# Patient Record
Sex: MALE | Race: WHITE | NOT HISPANIC OR LATINO | Employment: FULL TIME | URBAN - METROPOLITAN AREA
[De-identification: names, ages, dates, MRNs, and addresses within clinical notes are randomized per-mention and may not be internally consistent; named-entity substitution may affect disease eponyms.]

---

## 2017-01-04 DIAGNOSIS — J45.20 MILD INTERMITTENT ASTHMA WITHOUT COMPLICATION: Primary | ICD-10-CM

## 2017-01-04 RX ORDER — ALBUTEROL SULFATE 90 UG/1
2 AEROSOL, METERED RESPIRATORY (INHALATION) EVERY 6 HOURS PRN
Qty: 1 INHALER | Refills: 1 | Status: SHIPPED | OUTPATIENT
Start: 2017-01-04 | End: 2017-04-14

## 2017-02-02 DIAGNOSIS — H53.10 SUBJECTIVE VISUAL DISTURBANCE: Primary | ICD-10-CM

## 2017-02-07 ENCOUNTER — OFFICE VISIT (OUTPATIENT)
Dept: OPHTHALMOLOGY | Facility: CLINIC | Age: 52
End: 2017-02-07
Attending: OPHTHALMOLOGY
Payer: COMMERCIAL

## 2017-02-07 DIAGNOSIS — H50.21 HYPERTROPIA OF RIGHT EYE: ICD-10-CM

## 2017-02-07 DIAGNOSIS — G70.00 MYASTHENIA GRAVIS (H): Primary | ICD-10-CM

## 2017-02-07 DIAGNOSIS — H53.10 SUBJECTIVE VISUAL DISTURBANCE: ICD-10-CM

## 2017-02-07 DIAGNOSIS — H53.2 DIPLOPIA: ICD-10-CM

## 2017-02-07 PROCEDURE — 99215 OFFICE O/P EST HI 40 MIN: CPT | Mod: ZF

## 2017-02-07 PROCEDURE — 92060 SENSORIMOTOR EXAMINATION: CPT | Mod: ZF | Performed by: OPHTHALMOLOGY

## 2017-02-07 ASSESSMENT — VISUAL ACUITY
OD_CC+: -2
OS_CC: 20/20
METHOD: SNELLEN - LINEAR
OD_CC: 20/25

## 2017-02-07 ASSESSMENT — CONF VISUAL FIELD
OD_NORMAL: 1
OS_NORMAL: 1

## 2017-02-07 ASSESSMENT — REFRACTION_WEARINGRX
SPECS_TYPE: SVL
OS_SPHERE: PLANO
OD_SPHERE: PLANO

## 2017-02-07 ASSESSMENT — REFRACTION_FINALRX
OS_HPRISM: 5 BU
OD_HPRISM: 5 BD

## 2017-02-07 ASSESSMENT — TONOMETRY
IOP_METHOD: TONOPEN
OD_IOP_MMHG: 14
OS_IOP_MMHG: 14

## 2017-02-07 ASSESSMENT — SLIT LAMP EXAM - LIDS
COMMENTS: PTOSIS
COMMENTS: NORMAL

## 2017-02-07 NOTE — PROGRESS NOTES
Leonidas Pratt is a 52 year old male who presents for follow-up of ocular myasthenia. He was last seen 2/2016 during which his symptoms were found to be stable and it was thought he may have burned out. He was weaned of the prednisone. A Fresnel prism was placed on the right eye. He says the his glasses have been helping him throughout the work day. He will occasionally have to patch. Occasional double vision in primary gaze. Overall, he is very stable. Did not like the black contact lens.        Assessment & Plan     Leonidas Pratt is a 51 year old male with the following diagnoses:   1. Myasthenia gravis (H)    2. Diplopia    3. Subjective visual disturbance    4. Hypertropia of right eye       Mr. 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 He has a notable right hypertropia which is improved with Fresnel prism.  This seems to have stabilized. Give 10 base down RIGHT eye Fresnel prism.    RTC in 1 year or earlier if needed.            Attending Physician Attestation:  I have seen and examined this patient.  I have confirmed and edited as necessary the chief complaint(s), history of present illness, review of systems, relevant history, and examination findings as documented by others.  I have personally reviewed the relevant tests, images, and reports as documented above.  I have confirmed and edited as necessary the assessment and plan and agree with this note.  - Arnav Melendrez MD 10:33 AM 2/7/2017

## 2017-02-07 NOTE — NURSING NOTE
Chief Complaints and History of Present Illnesses   Patient presents with     Neurologic Problem     ocular MG     HPI    Affected eye(s):  Both   Symptoms:     Double vision      Frequency:  Intermittent       Do you have eye pain now?:  No      Comments:  Aguilar is a 52 year old male presenting wit Leonidas Pratt is a 51 year old male with the following diagnoses:      1.  Myasthenia gravis (H)    2.  Diplopia      - last visit: was fit with 8 BD over right eye.   - improved binocularity with prism correction  - intermittent, binocular, oblique diplopia.  - diplopic in right gaze (manifest).  - happy with prismatic power.     Vasquez TOBIAS 8:05 AM February 7, 2017

## 2017-02-21 DIAGNOSIS — R05.9 COUGH: Primary | ICD-10-CM

## 2017-02-21 NOTE — PROGRESS NOTES
Pat has been having fevers, cough and sweats for 2 weeks.  Brief exam reveals crackles in R lower lung fields and scattered wheezes.     Will obtain Chest X-ray.   Discussed but deferred CBC

## 2017-04-14 ENCOUNTER — OFFICE VISIT (OUTPATIENT)
Dept: FAMILY MEDICINE | Facility: CLINIC | Age: 52
End: 2017-04-14

## 2017-04-14 VITALS
HEART RATE: 71 BPM | DIASTOLIC BLOOD PRESSURE: 84 MMHG | BODY MASS INDEX: 27.73 KG/M2 | WEIGHT: 182.4 LBS | RESPIRATION RATE: 16 BRPM | SYSTOLIC BLOOD PRESSURE: 124 MMHG

## 2017-04-14 DIAGNOSIS — J20.9 ACUTE BRONCHITIS, UNSPECIFIED ORGANISM: ICD-10-CM

## 2017-04-14 DIAGNOSIS — E03.9 HYPOTHYROIDISM, UNSPECIFIED TYPE: ICD-10-CM

## 2017-04-14 DIAGNOSIS — R61 NIGHT SWEATS: Primary | ICD-10-CM

## 2017-04-14 DIAGNOSIS — E78.00 HIGH BLOOD CHOLESTEROL: ICD-10-CM

## 2017-04-14 RX ORDER — ALBUTEROL SULFATE 90 UG/1
2 AEROSOL, METERED RESPIRATORY (INHALATION) EVERY 6 HOURS PRN
Qty: 1 INHALER | Refills: 0 | Status: SHIPPED | OUTPATIENT
Start: 2017-04-14 | End: 2023-02-10

## 2017-04-14 RX ORDER — LEVOTHYROXINE SODIUM 112 UG/1
112 TABLET ORAL DAILY
Qty: 90 TABLET | Refills: 3 | Status: SHIPPED | OUTPATIENT
Start: 2017-04-14 | End: 2017-08-28

## 2017-04-14 ASSESSMENT — PAIN SCALES - GENERAL: PAINLEVEL: NO PAIN (0)

## 2017-04-14 NOTE — NURSING NOTE
Chief Complaint   Patient presents with     Perspiration     pt states having night sweats     Gisele Mauricio CMA at 12:04 PM on 4/14/2017.

## 2017-04-14 NOTE — PROGRESS NOTES
SUBJECTIVE:    Pt is a 52 year old male with pmh of     Patient Active Problem List   Diagnosis     Hypothyroidism     Pre-operative cardiovascular examination     Routine general medical examination at a health care facility     Calculus of ureter (URETERAL)     Exposure to strep throat       who is here for evaluation of had concerns including Perspiration.    Here for a few things.  One, hypothyroid, needs labs for, normal thryoid ROS today.  Two, due for routin lipids, will order.  Three, 52, consider colonsocopy baseline, no sx of colon dz, he'd like to consider but not do right now.  Four, in January got sore throat, cough, fatigue, severe, lasted several weeks, did get flu shot. Those sx gradually went away, did get CXR which showed possible small pneumonia vs atelectasis. No cough, dyspnea or fevers but since then, gets night sweats. Full body. Might not be quite as bad now as at first, but still gets. No itching or wt loss. Ten pt ROS completed, o/w neg except for his ocular sx, which have been decided to be most likely MG. Not on meds for.     Allergies   Allergen Reactions     Nkda [No Known Drug Allergies]            Current Outpatient Prescriptions   Medication Sig Dispense Refill     levothyroxine (SYNTHROID/LEVOTHROID) 112 MCG tablet Take 1 tablet (112 mcg) by mouth daily 90 tablet 3     albuterol (PROAIR HFA/PROVENTIL HFA/VENTOLIN HFA) 108 (90 BASE) MCG/ACT Inhaler Inhale 2 puffs into the lungs every 6 hours as needed for shortness of breath / dyspnea or wheezing 1 Inhaler 0     [DISCONTINUED] albuterol (PROAIR HFA/PROVENTIL HFA/VENTOLIN HFA) 108 (90 BASE) MCG/ACT Inhaler Inhale 2 puffs into the lungs every 6 hours as needed for shortness of breath / dyspnea or wheezing 1 Inhaler 1     [DISCONTINUED] levothyroxine (SYNTHROID, LEVOTHROID) 112 MCG tablet TAKE ONE TABLET BY MOUTH EVERY DAY 90 tablet 3     [DISCONTINUED] albuterol (PROAIR HFA, PROVENTIL HFA, VENTOLIN HFA) 108 (90 BASE) MCG/ACT inhaler  Inhale 2 puffs into the lungs every 6 hours as needed for shortness of breath / dyspnea or wheezing 1 Inhaler 0       Social History   Substance Use Topics     Smoking status: Never Smoker     Smokeless tobacco: Never Used     Alcohol use No         OBJECTIVE:  /84 (BP Location: Right arm, Patient Position: Chair, Cuff Size: Adult Large)  Pulse 71  Resp 16  Wt 82.7 kg (182 lb 6.4 oz)  BMI 27.73 kg/m2  GENERAL APPEARANCE: Alert, no acute distress  NECK: No adenopathy,masses or thyromegaly  RESP: lungs clear to auscultation   CV: normal rate, regular rhythm, no murmur or gallop  ABDOMEN: soft, no organomegaly, masses or tenderness  MS: extremities normal, no peripheral edema  NEURO: Alert, oriented, speech and mentation normal  PSYCHE: mentation appears normal, affect and mood normal    ASSESSMENT/PLAN:    Night sweats: labs, ct. If neg consider ID w/u.  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 Has had dx vitiligo and Hashimotos so two other immune conditions.  Hypothyroid: labs   HCM: lipids. Consider colonoscopy he knows.    GREG OTERO MD

## 2017-04-14 NOTE — PATIENT INSTRUCTIONS
Primary Care Center Medication Refill Request Information:  * Please contact your pharmacy regarding ANY request for medication refills.  ** [facility] Prescription Fax = [phone]  * Please allow 3 business days for routine medication refills.  * Please allow 5 business days for controlled substance medication refills.     Primary Care Center Test Result notification information:  *You will be notified with in 7-10 days of your appointment day regarding the results of your test.  If you are on MyChart you will be notified as soon as the provider has reviewed the results and signed off on them.      Primary Care Center   [facility]  [address] [place] [address] (4th Floor )   [place] [address] [phone]  [phone]

## 2017-04-14 NOTE — MR AVS SNAPSHOT
After Visit Summary   4/14/2017    Leonidas Pratt    MRN: [id number]           Patient Information     Date Of Birth          1965        Visit Information        Provider Department      4/14/2017 12:05 PM Moreno Marion MD [facility]        Today's Diagnoses     Night sweats    -  1    Hypothyroidism, unspecified type        Acute bronchitis, unspecified organism        High blood cholesterol          Care Instructions    Primary Care Center Medication Refill Request Information:  * Please contact your pharmacy regarding ANY request for medication refills.  ** [facility] Prescription Fax = [phone]  * Please allow 3 business days for routine medication refills.  * Please allow 5 business days for controlled substance medication refills.     Primary Care Center Test Result notification information:  *You will be notified with in 7-10 days of your appointment day regarding the results of your test.  If you are on MyChart you will be notified as soon as the provider has reviewed the results and signed off on them.      Primary Care Center   [facility]  [address] [place] [address] (4th Floor )   [place] [address] [phone]  [phone]          Follow-ups after your visit        Your next 10 appointments already scheduled     Apr 21, 2017  9:00 AM CDT   (Arrive by 8:45 AM)   CT CHEST ABDOMEN PELVIS W/O & W CONTRAST with UCCT2   [facility] ([facility])    [address] [place] [address]  1st [facility] [address] [phone]           Please bring any scans or X-rays taken at other hospitals, if similar tests were done. Also bring a list of your medicines, including vitamins, minerals and over-the-counter drugs. It is safest to leave personal items at home.  Be sure to tell your doctor:   If you have any allergies.   If there s any chance you are pregnant.   If you are breastfeeding.   If you have any special needs.  You may have  contrast for this exam. To prepare:   Do not eat or drink for 2 hours before your exam. If you need to take medicine, you may take it with small sips of water. (We may ask you to take liquid medicine as well.)   The day before your exam, drink extra fluids at least six 8-ounce glasses (unless your doctor tells you to restrict your fluids).  Patients over 70 or patients with diabetes or kidney problems:   If you haven t had a blood test (creatinine test) within the last 30 days, go to your clinic or Diagnostic Imaging Department for this test.  If you have diabetes:   If your kidney function is normal, continue taking your metformin (Avandamet, Glucophage, Glucovance, Metaglip) on the day of your exam.   If your kidney function is abnormal, wait 48 hours before restarting this medicine.  You will have oral contrast for this exam:   You will drink the contrast at home. Get this from your clinic or Diagnostic Imaging Department. Please follow the directions given.  Please wear loose clothing, such as a sweat suit or jogging clothes. Avoid snaps, zippers and other metal. We may ask you to undress and put on a hospital gown.  If you have any questions, please call the Imaging Department where you will have your exam.              Future tests that were ordered for you today     Open Future Orders        Priority Expected Expires Ordered    CT Chest/Abdomen/Pelvis w/o & w contrast Routine  4/14/2018 4/14/2017    Lipid panel reflex to direct LDL Routine 4/14/2017 4/28/2017 4/14/2017    CBC with platelets differential Routine 4/14/2017 4/28/2017 4/14/2017    Comprehensive metabolic panel Routine 4/14/2017 4/28/2017 4/14/2017    TSH with free T4 reflex Routine 4/14/2017 4/28/2017 4/14/2017            Who to contact     Please call your clinic at [phone] to:    Ask questions about your health    Make or cancel appointments    Discuss your medicines    Learn about your test results    Speak to your doctor   If you have  compliments or concerns about an experience at your clinic, or if you wish to file a complaint, please contact [facility] Patient Relations at [phone] or email us at [email or web address]         Additional Information About Your Visit        Omnigyhart Information     [email or web address] is an electronic gateway that provides easy, online access to your medical records. With [email or web address], you can request a clinic appointment, read your test results, renew a prescription or communicate with your care team.     To sign up for [email or web address] visit the website at [email or web address]   You will be asked to enter the access code listed below, as well as some personal information. Please follow the directions to create your username and password.     Your access code is: [email or web address]  Expires: 2017  9:30 AM     Your access code will  in 90 days. If you need help or a new code, please contact your [facility] Clinic or call [phone] for assistance.        Care EveryWhere ID     This is your Care EveryWhere ID. This could be used by other organizations to access your [place] medical records  [id number]        Your Vitals Were     Pulse Respirations BMI (Body Mass Index)             71 16 27.73 kg/m2          Blood Pressure from Last 3 Encounters:   17 124/84   16 110/80   10/15/14 128/84    Weight from Last 3 Encounters:   17 82.7 kg (182 lb 6.4 oz)   16 81.2 kg (179 lb)   10/21/14 78.8 kg (173 lb 11.2 oz)                 Today's Medication Changes          These changes are accurate as of: 17 12:43 PM.  If you have any questions, ask your nurse or doctor.               These medicines have changed or have updated prescriptions.        Dose/Directions    albuterol 108 (90 BASE) MCG/ACT Inhaler   Commonly known as:  PROAIR HFA/PROVENTIL HFA/VENTOLIN HFA   This may have changed:  Another medication with the same name was removed.  Continue taking this medication, and follow the directions you see here.   Used for:  Acute bronchitis, unspecified organism   Changed by:  Moreno Marion MD        Dose:  2 puff   Inhale 2 puffs into the lungs every 6 hours as needed for shortness of breath / dyspnea or wheezing   Quantity:  1 Inhaler   Refills:  0       levothyroxine 112 MCG tablet   Commonly known as:  SYNTHROID/LEVOTHROID   This may have changed:  See the new instructions.   Used for:  Hypothyroidism, unspecified type   Changed by:  Moreno Marion MD        Dose:  112 mcg   Take 1 tablet (112 mcg) by mouth daily   Quantity:  90 tablet   Refills:  3         Stop taking these medicines if you haven't already. Please contact your care team if you have questions.     acetaminophen 500 MG tablet   Commonly known as:  ACETAMINOPHEN EXTRA STRENGTH   Stopped by:  Moreno Marion MD           celecoxib 100 MG capsule   Commonly known as:  celeBREX   Stopped by:  Moreno Marion MD           ibuprofen 600 MG tablet   Commonly known as:  ADVIL/MOTRIN   Stopped by:  Moreno Marion MD           omeprazole 20 MG CR capsule   Commonly known as:  priLOSEC   Stopped by:  Moreno Marion MD           ondansetron 4 MG ODT tab   Commonly known as:  ZOFRAN ODT   Stopped by:  Moreno Marion MD           oxyCODONE-acetaminophen 5-325 MG per tablet   Commonly known as:  PERCOCET   Stopped by:  Moreno Marion MD           penicillin V potassium 500 MG tablet   Commonly known as:  VEETID   Stopped by:  Moreno Marion MD           predniSONE 5 MG tablet   Commonly known as:  DELTASONE   Stopped by:  Moreno Marion MD           Vitamin D-3 5000 UNITS Tabs   Stopped by:  Moreno Marion MD                Where to get your medicines      These medications were sent to [place] [address] [address]  [address] [place] [address] [address] [address]    Hours:  TRANSPLANT  PHONE NUMBER [phone] Phone:  [phone]     albuterol 108 (90 BASE) MCG/ACT Inhaler    levothyroxine 112 MCG tablet                Primary Care Provider Office Phone # Fax #    Moreno Marion [phone]       PRIMARY CARE CENTER [address] [place] [address] [address]  [facility] [address]        Thank you!     Thank you for choosing [facility]  for your care. Our goal is always to provide you with excellent care. Hearing back from our patients is one way we can continue to improve our services. Please take a few minutes to complete the written survey that you may receive in the mail after your visit with us. Thank you!             Your Updated Medication List - Protect others around you: Learn how to safely use, store and throw away your medicines at www.disposemymeds.org.          This list is accurate as of: 4/14/17 12:43 PM.  Always use your most recent med list.                   Brand Name Dispense Instructions for use    albuterol 108 (90 BASE) MCG/ACT Inhaler    PROAIR HFA/PROVENTIL HFA/VENTOLIN HFA    1 Inhaler    Inhale 2 puffs into the lungs every 6 hours as needed for shortness of breath / dyspnea or wheezing       levothyroxine 112 MCG tablet    SYNTHROID/LEVOTHROID    90 tablet    Take 1 tablet (112 mcg) by mouth daily

## 2017-06-09 ENCOUNTER — TELEPHONE (OUTPATIENT)
Dept: OPHTHALMOLOGY | Facility: CLINIC | Age: 52
End: 2017-06-09

## 2017-06-09 DIAGNOSIS — E78.00 HIGH BLOOD CHOLESTEROL: ICD-10-CM

## 2017-06-09 DIAGNOSIS — R61 NIGHT SWEATS: ICD-10-CM

## 2017-06-09 LAB
ALBUMIN SERPL-MCNC: 3.4 G/DL (ref 3.4–5)
ALP SERPL-CCNC: 77 U/L (ref 40–150)
ALT SERPL W P-5'-P-CCNC: 21 U/L (ref 0–70)
ANION GAP SERPL CALCULATED.3IONS-SCNC: 7 MMOL/L (ref 3–14)
AST SERPL W P-5'-P-CCNC: 17 U/L (ref 0–45)
BASOPHILS # BLD AUTO: 0 10E9/L (ref 0–0.2)
BASOPHILS NFR BLD AUTO: 0.7 %
BILIRUB SERPL-MCNC: 0.4 MG/DL (ref 0.2–1.3)
BUN SERPL-MCNC: 17 MG/DL (ref 7–30)
CALCIUM SERPL-MCNC: 7.8 MG/DL (ref 8.5–10.1)
CHLORIDE SERPL-SCNC: 106 MMOL/L (ref 94–109)
CHOLEST SERPL-MCNC: 198 MG/DL
CO2 SERPL-SCNC: 26 MMOL/L (ref 20–32)
CREAT SERPL-MCNC: 1.07 MG/DL (ref 0.66–1.25)
DIFFERENTIAL METHOD BLD: ABNORMAL
EOSINOPHIL # BLD AUTO: 0.1 10E9/L (ref 0–0.7)
EOSINOPHIL NFR BLD AUTO: 2.2 %
ERYTHROCYTE [DISTWIDTH] IN BLOOD BY AUTOMATED COUNT: 12.9 % (ref 10–15)
GFR SERPL CREATININE-BSD FRML MDRD: 73 ML/MIN/1.7M2
GLUCOSE SERPL-MCNC: 84 MG/DL (ref 70–99)
HCT VFR BLD AUTO: 37.8 % (ref 40–53)
HDLC SERPL-MCNC: 37 MG/DL
HGB BLD-MCNC: 13 G/DL (ref 13.3–17.7)
IMM GRANULOCYTES # BLD: 0 10E9/L (ref 0–0.4)
IMM GRANULOCYTES NFR BLD: 0.6 %
LDLC SERPL CALC-MCNC: 142 MG/DL
LYMPHOCYTES # BLD AUTO: 1.9 10E9/L (ref 0.8–5.3)
LYMPHOCYTES NFR BLD AUTO: 35.7 %
MCH RBC QN AUTO: 31.1 PG (ref 26.5–33)
MCHC RBC AUTO-ENTMCNC: 34.4 G/DL (ref 31.5–36.5)
MCV RBC AUTO: 90 FL (ref 78–100)
MONOCYTES # BLD AUTO: 0.6 10E9/L (ref 0–1.3)
MONOCYTES NFR BLD AUTO: 10.4 %
NEUTROPHILS # BLD AUTO: 2.7 10E9/L (ref 1.6–8.3)
NEUTROPHILS NFR BLD AUTO: 50.4 %
NONHDLC SERPL-MCNC: 161 MG/DL
NRBC # BLD AUTO: 0 10*3/UL
NRBC BLD AUTO-RTO: 0 /100
PLATELET # BLD AUTO: 218 10E9/L (ref 150–450)
POTASSIUM SERPL-SCNC: 3.9 MMOL/L (ref 3.4–5.3)
PROT SERPL-MCNC: 6.3 G/DL (ref 6.8–8.8)
RBC # BLD AUTO: 4.18 10E12/L (ref 4.4–5.9)
SODIUM SERPL-SCNC: 139 MMOL/L (ref 133–144)
T4 FREE SERPL-MCNC: 1.19 NG/DL (ref 0.76–1.46)
TRIGL SERPL-MCNC: 93 MG/DL
TSH SERPL DL<=0.005 MIU/L-ACNC: 7.9 MU/L (ref 0.4–4)
WBC # BLD AUTO: 5.4 10E9/L (ref 4–11)

## 2017-06-09 NOTE — TELEPHONE ENCOUNTER
Pt would like to discuss more about having one lens with prism vs both lenses with prism  reviewed with optical shop who will contact pt and let pt know will have orthoptist call Monday    Note to orthoptist to f/u Monday    Julian Peetrs RN 4:10 PM 06/09/17

## 2017-06-09 NOTE — TELEPHONE ENCOUNTER
Pt wishing to have prism ground in glasses  Glasses Rx availble  Able to print and have pt , fax, mail or pt may use [employer] to print-- unable to email.  Left message with pt with options and direct number  Julian Peters RN 10:01 AM 06/09/17

## 2017-06-09 NOTE — TELEPHONE ENCOUNTER
----- Message from Lianne Crystal sent at 6/9/2017  9:02 AM CDT -----  Regarding: PT LOOKING FOR PRISM STRENGTH OF RIGHT LENS TO GET GROUND  Contact: [phone]  Pt is Pt of Dr. Melendrez and Dr. Melendrez informed him if he every wanted to get a lens ground for this right eye (currently using paste on version) to call clinic and ask for prism strength. Pt is getting lenses made later today and requested the prism strength for right lens to be emailed to him at [email or web address].    Any questions, feel free to give Pt a call back at [phone].    Thank you,    [facility]    Please DO NOT send message and or reply back to sender. Call center Representatives DO NOT respond to Messages.

## 2017-06-09 NOTE — TELEPHONE ENCOUNTER
Last glasses Rx faxed to pt's optical shop per request  Reviewed prism will be in both lenses per Rx and reviewed when ground in is typical to mervin Peters RN 3:23 PM 06/09/17

## 2017-06-22 ENCOUNTER — TELEPHONE (OUTPATIENT)
Dept: INTERNAL MEDICINE | Facility: CLINIC | Age: 52
End: 2017-06-22

## 2017-06-22 DIAGNOSIS — E83.51 LOW CALCIUM LEVELS: ICD-10-CM

## 2017-06-22 DIAGNOSIS — D64.9 ANEMIA: Primary | ICD-10-CM

## 2017-06-22 DIAGNOSIS — R79.89 HIGH SERUM PARATHYROID HORMONE (PTH): ICD-10-CM

## 2017-06-22 DIAGNOSIS — E88.09 PROTEINS SERUM PLASMA LOW: ICD-10-CM

## 2017-06-22 NOTE — TELEPHONE ENCOUNTER
Lab orders were placed.    Soon-Mi  -----------------------------------------------      ----- Message from Moreno Marion MD sent at 6/22/2017 12:36 PM CDT -----  TAMMIE I did leave him a message, haven't heard back, maybe on vacation.    I told him we'll do some followup labs, we could get those ordered, can you put these in?    For anemia:  SPEP  Elias  Peripheral smear  CBC/diff  Reticulocyte count    For low calcium:  Free (also called ionized) calcium  PTH    For low protein:  Hepatic panel  UA    I'll call back later today    Marcus

## 2017-06-27 DIAGNOSIS — D64.9 ANEMIA: ICD-10-CM

## 2017-06-27 DIAGNOSIS — E88.09 PROTEINS SERUM PLASMA LOW: ICD-10-CM

## 2017-06-27 DIAGNOSIS — E83.51 LOW CALCIUM LEVELS: ICD-10-CM

## 2017-06-27 LAB
ALBUMIN SERPL-MCNC: 4.4 G/DL (ref 3.4–5)
ALBUMIN UR-MCNC: NEGATIVE MG/DL
ALP SERPL-CCNC: 100 U/L (ref 40–150)
ALT SERPL W P-5'-P-CCNC: 36 U/L (ref 0–70)
APPEARANCE UR: CLEAR
AST SERPL W P-5'-P-CCNC: 24 U/L (ref 0–45)
BASOPHILS # BLD AUTO: 0 10E9/L (ref 0–0.2)
BASOPHILS NFR BLD AUTO: 0.6 %
BILIRUB DIRECT SERPL-MCNC: 0.2 MG/DL (ref 0–0.2)
BILIRUB SERPL-MCNC: 0.6 MG/DL (ref 0.2–1.3)
BILIRUB UR QL STRIP: NEGATIVE
CA-I SERPL ISE-MCNC: 4.8 MG/DL (ref 4.4–5.2)
COLOR UR AUTO: YELLOW
DAT IGG-SP REAG RBC-IMP: NORMAL
DAT POLY-SP REAG RBC QL: NORMAL
DIFFERENTIAL METHOD BLD: NORMAL
EOSINOPHIL # BLD AUTO: 0.1 10E9/L (ref 0–0.7)
EOSINOPHIL NFR BLD AUTO: 1.2 %
ERYTHROCYTE [DISTWIDTH] IN BLOOD BY AUTOMATED COUNT: 13 % (ref 10–15)
GLUCOSE UR STRIP-MCNC: NEGATIVE MG/DL
HCT VFR BLD AUTO: 44.7 % (ref 40–53)
HGB BLD-MCNC: 15.2 G/DL (ref 13.3–17.7)
HGB UR QL STRIP: NEGATIVE
IMM GRANULOCYTES # BLD: 0 10E9/L (ref 0–0.4)
IMM GRANULOCYTES NFR BLD: 0.4 %
KETONES UR STRIP-MCNC: NEGATIVE MG/DL
LEUKOCYTE ESTERASE UR QL STRIP: NEGATIVE
LYMPHOCYTES # BLD AUTO: 2.4 10E9/L (ref 0.8–5.3)
LYMPHOCYTES NFR BLD AUTO: 33.6 %
MCH RBC QN AUTO: 31.1 PG (ref 26.5–33)
MCHC RBC AUTO-ENTMCNC: 34 G/DL (ref 31.5–36.5)
MCV RBC AUTO: 91 FL (ref 78–100)
MONOCYTES # BLD AUTO: 0.7 10E9/L (ref 0–1.3)
MONOCYTES NFR BLD AUTO: 9 %
MUCOUS THREADS #/AREA URNS LPF: PRESENT /LPF
NEUTROPHILS # BLD AUTO: 4 10E9/L (ref 1.6–8.3)
NEUTROPHILS NFR BLD AUTO: 55.2 %
NITRATE UR QL: NEGATIVE
NRBC # BLD AUTO: 0 10*3/UL
NRBC BLD AUTO-RTO: 0 /100
PH UR STRIP: 6 PH (ref 5–7)
PLATELET # BLD AUTO: 271 10E9/L (ref 150–450)
PROT SERPL-MCNC: 8.1 G/DL (ref 6.8–8.8)
PTH-INTACT SERPL-MCNC: 91 PG/ML (ref 12–72)
RBC # BLD AUTO: 4.89 10E12/L (ref 4.4–5.9)
RBC #/AREA URNS AUTO: 1 /HPF (ref 0–2)
RETICS # AUTO: 77.8 10E9/L (ref 25–95)
RETICS/RBC NFR AUTO: 1.6 % (ref 0.5–2)
SP GR UR STRIP: 1.03 (ref 1–1.03)
URN SPEC COLLECT METH UR: ABNORMAL
UROBILINOGEN UR STRIP-MCNC: 0 MG/DL (ref 0–2)
WBC # BLD AUTO: 7.2 10E9/L (ref 4–11)
WBC #/AREA URNS AUTO: <1 /HPF (ref 0–2)

## 2017-06-28 LAB
ALBUMIN SERPL ELPH-MCNC: 4.9 G/DL (ref 3.7–5.1)
ALPHA1 GLOB SERPL ELPH-MCNC: 0.3 G/DL (ref 0.2–0.4)
ALPHA2 GLOB SERPL ELPH-MCNC: 0.7 G/DL (ref 0.5–0.9)
B-GLOBULIN SERPL ELPH-MCNC: 0.8 G/DL (ref 0.6–1)
COPATH REPORT: NORMAL
GAMMA GLOB SERPL ELPH-MCNC: 1.2 G/DL (ref 0.7–1.6)
M PROTEIN SERPL ELPH-MCNC: 0 G/DL
PROT PATTERN SERPL ELPH-IMP: NORMAL

## 2017-06-29 LAB — DEPRECATED CALCIDIOL+CALCIFEROL SERPL-MC: 31 UG/L (ref 20–75)

## 2017-07-12 DIAGNOSIS — E03.9 HYPOTHYROIDISM, UNSPECIFIED TYPE: Primary | ICD-10-CM

## 2017-07-12 DIAGNOSIS — E03.9 HYPOTHYROIDISM, UNSPECIFIED TYPE: ICD-10-CM

## 2017-07-12 DIAGNOSIS — G70.00 MYASTHENIA GRAVIS (H): Primary | ICD-10-CM

## 2017-07-12 LAB — TSH SERPL DL<=0.05 MIU/L-ACNC: 0.21 MU/L (ref 0.4–4)

## 2017-07-12 RX ORDER — PREDNISONE 10 MG/1
50 TABLET ORAL DAILY
Qty: 150 TABLET | Refills: 3 | Status: SHIPPED | OUTPATIENT
Start: 2017-07-12 | End: 2019-02-27

## 2017-08-03 ENCOUNTER — HOSPITAL ENCOUNTER (OUTPATIENT)
Facility: CLINIC | Age: 52
Setting detail: SPECIMEN
Discharge: HOME | End: 2017-08-03
Admitting: FAMILY MEDICINE
Payer: COMMERCIAL

## 2017-08-03 ENCOUNTER — TELEPHONE (OUTPATIENT)
Dept: ORTHOPEDICS | Facility: CLINIC | Age: 52
End: 2017-08-03

## 2017-08-03 DIAGNOSIS — J02.9 ACUTE PHARYNGITIS, UNSPECIFIED ETIOLOGY: Primary | ICD-10-CM

## 2017-08-03 PROCEDURE — 87880 STREP A ASSAY W/OPTIC: CPT

## 2017-08-03 RX ORDER — PENICILLIN V POTASSIUM 500 MG/1
500 TABLET, FILM COATED ORAL 2 TIMES DAILY
Qty: 20 TABLET | Refills: 0 | Status: SHIPPED | OUTPATIENT
Start: 2017-08-03 | End: 2018-09-19

## 2017-08-28 DIAGNOSIS — E03.9 HYPOTHYROIDISM, UNSPECIFIED TYPE: ICD-10-CM

## 2017-08-28 RX ORDER — LEVOTHYROXINE SODIUM 150 UG/1
150 TABLET ORAL DAILY
Qty: 30 TABLET | Refills: 1 | Status: SHIPPED | OUTPATIENT
Start: 2017-08-28 | End: 2019-09-30 | Stop reason: DRUGHIGH

## 2017-09-06 DIAGNOSIS — E03.9 HYPOTHYROIDISM, UNSPECIFIED TYPE: Primary | ICD-10-CM

## 2017-09-06 RX ORDER — LEVOTHYROXINE SODIUM 125 UG/1
125 TABLET ORAL DAILY
Qty: 90 TABLET | Refills: 3 | Status: SHIPPED | OUTPATIENT
Start: 2017-09-06 | End: 2018-09-04

## 2017-09-12 DIAGNOSIS — E03.9 HYPOTHYROIDISM, UNSPECIFIED TYPE: ICD-10-CM

## 2017-09-20 ENCOUNTER — HOSPITAL ENCOUNTER (OUTPATIENT)
Facility: CLINIC | Age: 52
Setting detail: SPECIMEN
End: 2017-09-20

## 2017-09-28 DIAGNOSIS — J02.9 ACUTE PHARYNGITIS, UNSPECIFIED ETIOLOGY: ICD-10-CM

## 2017-09-28 DIAGNOSIS — J02.9 ACUTE PHARYNGITIS, UNSPECIFIED ETIOLOGY: Primary | ICD-10-CM

## 2017-09-28 DIAGNOSIS — R07.0 THROAT PAIN IN ADULT: Primary | ICD-10-CM

## 2017-09-28 LAB
DEPRECATED S PYO AG THROAT QL EIA: NORMAL
SPECIMEN SOURCE: NORMAL

## 2017-09-30 LAB
BACTERIA SPEC CULT: NORMAL
Lab: NORMAL
SPECIMEN SOURCE: NORMAL

## 2017-10-26 ENCOUNTER — TELEPHONE (OUTPATIENT)
Dept: INTERNAL MEDICINE | Facility: CLINIC | Age: 52
End: 2017-10-26

## 2017-10-26 DIAGNOSIS — E03.9 HYPOTHYROIDISM: Primary | ICD-10-CM

## 2017-10-26 DIAGNOSIS — E03.9 HYPOTHYROIDISM: ICD-10-CM

## 2017-10-26 LAB — TSH SERPL DL<=0.005 MIU/L-ACNC: 2.47 MU/L (ref 0.4–4)

## 2018-01-11 DIAGNOSIS — J11.1 INFLUENZA: Primary | ICD-10-CM

## 2018-01-11 RX ORDER — OSELTAMIVIR PHOSPHATE 75 MG/1
75 CAPSULE ORAL 2 TIMES DAILY
Qty: 10 CAPSULE | Refills: 0 | Status: SHIPPED | OUTPATIENT
Start: 2018-01-11 | End: 2018-09-19

## 2018-06-11 ENCOUNTER — TELEPHONE (OUTPATIENT)
Dept: INTERNAL MEDICINE | Facility: CLINIC | Age: 53
End: 2018-06-11

## 2018-06-11 DIAGNOSIS — E03.9 HYPOTHYROIDISM: ICD-10-CM

## 2018-06-11 DIAGNOSIS — E03.9 HYPOTHYROIDISM: Primary | ICD-10-CM

## 2018-06-11 LAB — TSH SERPL DL<=0.005 MIU/L-ACNC: 1.7 MU/L (ref 0.4–4)

## 2018-09-04 DIAGNOSIS — E03.9 HYPOTHYROIDISM, UNSPECIFIED TYPE: ICD-10-CM

## 2018-09-04 RX ORDER — LEVOTHYROXINE SODIUM 125 UG/1
TABLET ORAL
Qty: 90 TABLET | Refills: 3 | Status: SHIPPED | OUTPATIENT
Start: 2018-09-04 | End: 2019-09-30

## 2018-09-19 ENCOUNTER — TELEPHONE (OUTPATIENT)
Dept: INTERNAL MEDICINE | Facility: CLINIC | Age: 53
End: 2018-09-19

## 2018-09-19 DIAGNOSIS — Z12.11 COLON CANCER SCREENING: Primary | ICD-10-CM

## 2018-09-20 ENCOUNTER — TELEPHONE (OUTPATIENT)
Dept: GASTROENTEROLOGY | Facility: CLINIC | Age: 53
End: 2018-09-20

## 2018-09-21 ENCOUNTER — TELEPHONE (OUTPATIENT)
Dept: GASTROENTEROLOGY | Facility: CLINIC | Age: 53
End: 2018-09-21

## 2018-09-24 ENCOUNTER — TELEPHONE (OUTPATIENT)
Dept: GASTROENTEROLOGY | Facility: CLINIC | Age: 53
End: 2018-09-24

## 2018-10-22 ENCOUNTER — ALLIED HEALTH/NURSE VISIT (OUTPATIENT)
Dept: INTERNAL MEDICINE | Facility: CLINIC | Age: 53
End: 2018-10-22
Payer: COMMERCIAL

## 2018-10-22 DIAGNOSIS — Z11.1 SCREENING EXAMINATION FOR PULMONARY TUBERCULOSIS: Primary | ICD-10-CM

## 2018-10-22 NOTE — LETTER
Tuberculin Skin Test  Reporting Form    2018    Patient s Name  Leonidas Pratt  :  1965      Date Given: ________10/22/2018______ Time:____09___ AM Lot#_[id number]____  Location: Right Forearm    YVONNE Frias  _________________________________________  Staff Name         PPD Skin test site(s) must be read 48-72 hours after placed!    Date Read: _10_/_24_/_18_ Time: _2:45_ PM  Result: _0_ mm (if no induration, write 0)  Comments:        _Sweta Maier LPN__________________________________________________  Staff Signature

## 2018-10-22 NOTE — MR AVS SNAPSHOT
After Visit Summary   10/22/2018    Leonidas Pratt    MRN: [id number]           Patient Information     Date Of Birth          1965        Visit Information        Provider Department      10/22/2018 9:30 AM Nurse, [facility]        Today's Diagnoses     Screening examination for pulmonary tuberculosis    -  1       Follow-ups after your visit        Your next 10 appointments already scheduled     Oct 22, 2018  9:30 AM CDT   Nurse Visit with [facility] Nurse   [facility] ([facility])    [address] [facility]  4th [facility] [address] [phone]              Who to contact     Please call your clinic at [phone] to:    Ask questions about your health    Make or cancel appointments    Discuss your medicines    Learn about your test results    Speak to your doctor            Additional Information About Your Visit        MyChart Information     [employer] gives you secure access to your electronic health record. If you see a primary care provider, you can also send messages to your care team and make appointments. If you have questions, please call your primary care clinic.  If you do not have a primary care provider, please call [phone] and they will assist you.      [employer] is an electronic gateway that provides easy, online access to your medical records. With [employer], you can request a clinic appointment, read your test results, renew a prescription or communicate with your care team.     To access your existing account, please contact your [facility] or call [phone] for assistance.        Care EveryWhere ID     This is your Care EveryWhere ID. This could be used by other organizations to access your [place] medical records  [id number]         Blood Pressure from Last 3 Encounters:   04/14/17 124/84   01/03/16 110/80   10/15/14 128/84    Weight from Last 3 Encounters:    04/14/17 82.7 kg (182 lb 6.4 oz)   01/03/16 81.2 kg (179 lb)   10/21/14 78.8 kg (173 lb 11.2 oz)              We Performed the Following     TB INTRADERMAL TEST        Primary Care Provider Office Phone # Fax #    Moreno Marion [phone]       [address] [address]        Equal Access to Services     BRADY PUENTE : Hadii aad ku hadasho Soomaali, waaxda luqadaha, qaybta kaalmada adeegyada, waxay idiin hayaan adeeg kharash labrenda . So [facility] [phone].    ATENCIÓN: Si jesus black, tiene a tompkins disposición servicios gratuitos de asistencia lingüística. Ll[facility] [phone].    We comply with applicable federal civil rights laws and [place] laws. We do not discriminate on the basis of race, color, national origin, age, disability, sex, sexual orientation, or gender identity.            Thank you!     Thank you for choosing [facility]  for your care. Our goal is always to provide you with excellent care. Hearing back from our patients is one way we can continue to improve our services. Please take a few minutes to complete the written survey that you may receive in the mail after your visit with us. Thank you!             Your Updated Medication List - Protect others around you: Learn how to safely use, store and throw away your medicines at www.disposemymeds.org.          This list is accurate as of 10/22/18  9:21 AM.  Always use your most recent med list.                   Brand Name Dispense Instructions for use Diagnosis    albuterol 108 (90 Base) MCG/ACT inhaler    PROAIR HFA/PROVENTIL HFA/VENTOLIN HFA    1 Inhaler    Inhale 2 puffs into the lungs every 6 hours as needed for shortness of breath / dyspnea or wheezing    Acute bronchitis, unspecified organism       * levothyroxine 150 MCG tablet    SYNTHROID/LEVOTHROID    30 tablet    Take 1 tablet (150 mcg) by mouth daily    Hypothyroidism, unspecified type       * levothyroxine 125 MCG tablet     SYNTHROID/LEVOTHROID    90 tablet    TAKE ONE TABLET BY MOUTH EVERY DAY    Hypothyroidism, unspecified type       predniSONE 10 MG tablet    DELTASONE    150 tablet    Take 5 tablets (50 mg) by mouth daily    Myasthenia gravis (H)       * Notice:  This list has 2 medication(s) that are the same as other medications prescribed for you. Read the directions carefully, and ask your doctor or other care provider to review them with you.

## 2018-10-22 NOTE — NURSING NOTE
Chief Complaint   Patient presents with     Allied Health Visit     merrick Barth at 9:18 AM on 10/22/2018.

## 2019-02-06 ENCOUNTER — DOCUMENTATION ONLY (OUTPATIENT)
Dept: CARE COORDINATION | Facility: CLINIC | Age: 54
End: 2019-02-06

## 2019-02-27 ENCOUNTER — OFFICE VISIT (OUTPATIENT)
Dept: OPHTHALMOLOGY | Facility: CLINIC | Age: 54
End: 2019-02-27
Payer: COMMERCIAL

## 2019-02-27 DIAGNOSIS — G70.00 OCULAR MYASTHENIA (H): Primary | ICD-10-CM

## 2019-02-27 DIAGNOSIS — H50.05 ALTERNATING ESOTROPIA: ICD-10-CM

## 2019-02-27 RX ORDER — PREDNISONE 5 MG/1
5 TABLET ORAL DAILY
Qty: 30 TABLET | Refills: 11 | Status: SHIPPED | OUTPATIENT
Start: 2019-02-27 | End: 2020-03-23

## 2019-02-27 RX ORDER — APRACLONIDINE HYDROCHLORIDE 5 MG/ML
1-2 SOLUTION/ DROPS OPHTHALMIC 3 TIMES DAILY
Qty: 1 ML | Refills: 11 | Status: SHIPPED | OUTPATIENT
Start: 2019-02-27 | End: 2020-04-16

## 2019-02-27 RX ORDER — PREDNISONE 5 MG/1
5 TABLET ORAL DAILY
COMMUNITY
End: 2019-09-30

## 2019-02-27 ASSESSMENT — REFRACTION_MANIFEST
OD_AXIS: 085
OS_AXIS: 035
OS_SPHERE: -0.75
OD_SPHERE: -0.50
OD_CYLINDER: +0.50
OS_CYLINDER: +1.00

## 2019-02-27 ASSESSMENT — CUP TO DISC RATIO
OS_RATIO: 0.3
OD_RATIO: 0.2

## 2019-02-27 ASSESSMENT — TONOMETRY
IOP_METHOD: ICARE
OD_IOP_MMHG: 10
OS_IOP_MMHG: 12

## 2019-02-27 ASSESSMENT — SLIT LAMP EXAM - LIDS
COMMENTS: NORMAL
COMMENTS: NORMAL

## 2019-02-27 ASSESSMENT — VISUAL ACUITY
OD_CC: 20/20
CORRECTION_TYPE: GLASSES
METHOD: SNELLEN - LINEAR
OS_CC: 20/20

## 2019-02-27 NOTE — PROGRESS NOTES
Assessment & Plan     Leonidas Pratt is a 54 year old male with the following diagnoses:   1. Ocular myasthenia (H)    2. Alternating esotropia         Patient is here for recheck of ocular myasthenia.  Last visit February 2017.  At that time he ws stable with and we gave him a 10 base down Fresnel prism over the right eye. Today he is no longer experiencing double vision without prism after restarting prednisone.  He is currently taking 5 mg of prednisone.       Visual acuity today stable.  He is orthophoric in primary gaze without prism.  .      It is my impression that patient has ocular myasthenia.  He is status-post strabismus surgery before the diagnosis of ocular myasthenia gravis was made.  His strabismus off prednisone was stable for > 1 year.  In December, he experienced worsening and self medicated with prednisone 60 mg daily tapering 10 mg/d each week.  He currently is taking 5 mg of mestinon and his strabismus is MUCH better without prism.   He would prefer to be off prednisone.  We discussed other options.  I will start him on iopidine 3 times daily as needed to see if improves ptosis.  Continue prednisone 5 mg daily.  Follow up 1 year sooner as needed for worsening symptoms.          Attending Physician Attestation:  Complete documentation of historical and exam elements from today's encounter can be found in the full encounter summary report (not reduplicated in this progress note).  I personally obtained the chief complaint(s) and history of present illness.  I confirmed and edited as necessary the review of systems, past medical/surgical history, family history, social history, and examination findings as documented by others; and I examined the patient myself.  I personally reviewed the relevant tests, images, and reports as documented above.  I formulated and edited as necessary the assessment and plan and discussed the findings and management plan with the patient and family. - Arnav  JESS Melendrez MD

## 2019-09-30 ENCOUNTER — TELEPHONE (OUTPATIENT)
Dept: FAMILY MEDICINE | Facility: CLINIC | Age: 54
End: 2019-09-30

## 2019-09-30 ENCOUNTER — HEALTH MAINTENANCE LETTER (OUTPATIENT)
Age: 54
End: 2019-09-30

## 2019-09-30 DIAGNOSIS — E03.9 HYPOTHYROIDISM, UNSPECIFIED TYPE: ICD-10-CM

## 2019-09-30 DIAGNOSIS — Z00.00 HEALTH CARE MAINTENANCE: ICD-10-CM

## 2019-09-30 DIAGNOSIS — E03.9 HYPOTHYROIDISM, UNSPECIFIED TYPE: Primary | ICD-10-CM

## 2019-09-30 LAB
CHOLEST SERPL-MCNC: 247 MG/DL
HBA1C MFR BLD: 5.4 % (ref 0–5.6)
HDLC SERPL-MCNC: 44 MG/DL
LDLC SERPL CALC-MCNC: 171 MG/DL
NONHDLC SERPL-MCNC: 202 MG/DL
TRIGL SERPL-MCNC: 159 MG/DL
TSH SERPL DL<=0.005 MIU/L-ACNC: 1.05 MU/L (ref 0.4–4)

## 2019-09-30 RX ORDER — LEVOTHYROXINE SODIUM 125 UG/1
125 TABLET ORAL DAILY
Qty: 90 TABLET | Refills: 3 | Status: SHIPPED | OUTPATIENT
Start: 2019-09-30 | End: 2020-10-05

## 2019-09-30 NOTE — TELEPHONE ENCOUNTER
Lab called , pt is at lab now but labs are pending future. Spoke with staff who will call lab back to discuss. Tena Douglas Paramedic on 9/30/2019 at 11:25 AM

## 2019-09-30 NOTE — PROGRESS NOTES
Patient in need of Rx refill of Levothyroxine, Rx sent to pharmacy per provider. Routine labs pended and sent to provider for review. Sweta Maier LPN 9/30/2019 11:07 AM

## 2019-12-03 ENCOUNTER — ALLIED HEALTH/NURSE VISIT (OUTPATIENT)
Dept: INTERNAL MEDICINE | Facility: CLINIC | Age: 54
End: 2019-12-03
Payer: COMMERCIAL

## 2019-12-03 DIAGNOSIS — Z23 NEED FOR INFLUENZA VACCINATION: Primary | ICD-10-CM

## 2019-12-03 NOTE — NURSING NOTE
Leonidas Pratt comes into clinic today at the request of Dr Moreno Marion, Ordering Provider for an immunization/s.    I gave the flu immunization/s while the patient was in clinic. They received an informational sheet and I explained the common side effects of the injection. The patient tolerated the procedure well and had no complications while here in clinic.     This service provided today was under the supervising provider of the day Dr. Moreno Marion  , who was available if needed.    Kathy Barth, EMT at 11:39 AM on 12/3/2019.

## 2020-03-14 ENCOUNTER — VIRTUAL VISIT (OUTPATIENT)
Dept: FAMILY MEDICINE | Facility: OTHER | Age: 55
End: 2020-03-14

## 2020-03-14 ENCOUNTER — COMMUNICATION - HEALTHEAST (OUTPATIENT)
Dept: SCHEDULING | Facility: CLINIC | Age: 55
End: 2020-03-14

## 2020-03-14 NOTE — PROGRESS NOTES
"Date: 2020 10:28:03  Clinician: Kami Parr  Clinician NPI: [id number]  Patient: nieves rosales  Patient : 1965  Patient Address: [address] [place] [address], [place] [address]  Patient Phone: [phone]  Visit Protocol: URI  Patient Summary:  nieves is a 55 year old ( : 1965 ) male who initiated a Visit for COVID-19 (Coronavirus) evaluation and screening. When asked the question \"Please sign me up to receive news, health information and promotions from [employer].\", nieves responded \"No\".    nieves states his symptoms started 1-2 days ago.   His symptoms consist of malaise, myalgia, chills, and a cough. He is experiencing mild difficulty breathing with activities but can speak normally in full sentences. nieves also feels feverish but was unable to measure his temperature.   Symptom details   Cough: nieves coughs a few times an hour and his cough is more bothersome at night. Phlegm does not come into his throat when he coughs. He does not believe his cough is caused by post-nasal drip.    nieves denies having teeth pain, ear pain, headache, rhinitis, enlarged lymph nodes, facial pain or pressure, wheezing, sore throat, and nasal congestion. He also denies having recent facial or sinus surgery in the past 60 days and taking antibiotic medication for the symptoms.   Precipitating events  He has not recently been exposed to someone with influenza. nieves has been in close contact with the following high risk individuals: adults 65 or older, pregnant women, people with asthma, heart disease or diabetes, and immunocompromised people.   Pertinent COVID-19 (Coronavirus) information  nieves has not traveled internationally or to the areas where COVID-19 (Coronavirus) is widespread in the last 14 days before the start of his symptoms.   nieves has not had close contact with a suspected or laboratory-confirmed COVID-19 patient within 14 days of symptom onset.   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   Pertinent medical history  nieves does not need a return to work/school note.   Weight: 165 lbs   nieves does not smoke or use smokeless tobacco.   Additional information as reported by the patient (free text): i'm on chronic prednisone for immunosuppression   Weight: 165 lbs    MEDICATIONS: albuterol sulfate inhalation, Synthroid oral, prednisone oral, ALLERGIES: NKDA  Clinician Response:  Dear nieves,   Dear nieves,  Based on the information you have provided, it does not appear you need Coronavirus (COVID-19) testing. Unfortunately, based on your information we are also not able to provide a diagnosis. We feel an in-person visit with a provider is more appropriate for your condition based on your interview. We'd be honored to see you in one of our clinics or urgent cares. Please call [phone] to schedule an appointment.  We request that you bring documentation of your completed [facility] visit to your clinic appointment. Failure to do so may result in a request to repeat your [facility] visit. Documentation could include a printout from your visit or show the  the completed visit on your phone.  Why no testing?  At this time, we recommend testing primarily for those people who have typical symptoms of cough and fever and have either traveled to a known high risk area of infection or who have been exposed to someone with Coronavirus by close contact.   For more information about COVID19 and options for caring for yourself at home, please visit the CDC website at https://www.cdc.gov/coronavirus/2019-ncov/about/steps-when-sick.html   For more options for care at [facility], please visit our website at [email or web address]     Diagnosis: Cough  Diagnosis ICD: R05

## 2020-03-21 DIAGNOSIS — G70.00 OCULAR MYASTHENIA (H): ICD-10-CM

## 2020-03-23 RX ORDER — PREDNISONE 5 MG/1
5 TABLET ORAL DAILY
Qty: 30 TABLET | Refills: 11 | Status: SHIPPED | OUTPATIENT
Start: 2020-03-23 | End: 2020-04-30

## 2020-03-23 NOTE — TELEPHONE ENCOUNTER
"  predniSONE (DELTASONE) 5 MG tablet    Last Written Prescription Date:  2/27/19  Last Fill Quantity: 30,   # refills: 11  Last Office Visit : 2/27/19  Future Office visit: none    \"Continue prednisone 5 mg daily\"    \" Follow up 1 year sooner :.    Routing refill request to provider for review/approval because: not on protocol. Needs provider review.  "

## 2020-04-16 DIAGNOSIS — G70.00 OCULAR MYASTHENIA (H): ICD-10-CM

## 2020-04-16 RX ORDER — APRACLONIDINE HYDROCHLORIDE 5 MG/ML
1-2 SOLUTION/ DROPS OPHTHALMIC 3 TIMES DAILY
Qty: 5 ML | Refills: 3 | Status: SHIPPED | OUTPATIENT
Start: 2020-04-16 | End: 2021-02-17

## 2020-04-16 NOTE — TELEPHONE ENCOUNTER
Last Clinic Visit: 2/27/2019  [facility] ( RTC 1 year)  Last Clinic note   I will start him on iopidine 3 times daily as needed to see if improves ptosis

## 2020-04-17 ENCOUNTER — TELEPHONE (OUTPATIENT)
Dept: OPHTHALMOLOGY | Facility: CLINIC | Age: 55
End: 2020-04-17

## 2020-04-17 NOTE — TELEPHONE ENCOUNTER
Patient left message that he would like to schedule a video or telephone visit regarding his Ocular myasthenia and alternating esotropia.

## 2020-04-20 NOTE — TELEPHONE ENCOUNTER
Spoke to patient.  He has been having a lot of difficulty with diplopia and unable to keep his lids open the last few days.  He increased his prednisone up to 40 mg daily and has noticed some improvement in his lids today.  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  Tentatively scheduled him for a virtual appointment 4/30 at 1:00 p.m. Will discuss with Dr. Melendrez to make sure he is comfortable doing virtual visit, and get back to patient if other arrangements need to be made.      Kelley Leone on 4/20/2020 at 12:41 PM

## 2020-04-23 ENCOUNTER — TELEPHONE (OUTPATIENT)
Dept: OPHTHALMOLOGY | Facility: CLINIC | Age: 55
End: 2020-04-23

## 2020-04-23 DIAGNOSIS — G70.00 OCULAR MYASTHENIA (H): Primary | ICD-10-CM

## 2020-04-23 RX ORDER — PREDNISONE 10 MG/1
30 TABLET ORAL DAILY
Qty: 90 TABLET | Refills: 1 | Status: SHIPPED | OUTPATIENT
Start: 2020-04-23 | End: 2021-03-17

## 2020-04-23 NOTE — TELEPHONE ENCOUNTER
Prescription sent to patient's pharmacy for 10 mg tabs.      Kelley Leone on 4/23/2020 at 2:53 PM

## 2020-04-30 ENCOUNTER — VIRTUAL VISIT (OUTPATIENT)
Dept: OPHTHALMOLOGY | Facility: CLINIC | Age: 55
End: 2020-04-30
Attending: OPHTHALMOLOGY
Payer: COMMERCIAL

## 2020-04-30 DIAGNOSIS — G70.00 OCULAR MYASTHENIA (H): Primary | ICD-10-CM

## 2020-04-30 RX ORDER — PREDNISONE 5 MG/1
20 TABLET ORAL DAILY
Qty: 120 TABLET | Refills: 11 | Status: SHIPPED | OUTPATIENT
Start: 2020-04-30 | End: 2020-10-06

## 2020-04-30 NOTE — PROGRESS NOTES
"Leonidas Pratt is a 55 year old male who is being evaluated via a billable video visit.      The patient has been notified of following:     \"This video visit will be conducted via a call between you and your physician/provider. We have found that certain health care needs can be provided without the need for an in-person physical exam.  This service lets us provide the care you need with a video conversation.  If a prescription is necessary we can send it directly to your pharmacy.  If lab work is needed we can place an order for that and you can then stop by our lab to have the test done at a later time.    Video visits are billed at different rates depending on your insurance coverage.  Please reach out to your insurance provider with any questions.    If during the course of the call the physician/provider feels a video visit is not appropriate, you will not be charged for this service.\"    Patient has given verbal consent for Video visit? Yes    How would you like to obtain your AVS? Johanna    Patient would like the video invitation sent by: Send to e-mail at: [email or web address]    Will anyone else be joining your video visit? No        Video-Visit Details    Type of service:  Video Visit    Video Start Time: 1258pm  Video End Time: 1:19 pm     Originating Location (pt. Location): Home    Distant Location (provider location):  EYE CLINIC     Platform used for Video Visit: Delmy     The patient was last seen 1 year ago for ocular myasthenia.  He was previously on 5 mg of mestinon with improvement in double vision and no longer needing prism in his glasses for the last year.  Approximately 4 months ago he began noticing some mild diplopia and ptosis that worsened throughout the day  Approximately 4 weeks ago he developed even worse diplopia and started patching the eye.  Two weeks ago had worsening of ptosis of both eyes.  He increased his prednisone up to 40 mg daily and began noticing improvement after doing " so. He then tapered to 30 mg for a week and is now using 25 mg daily.  He still has diplopia at the end of the day but is much improved compared to a couple weeks ago.  Last visit I offered him iopidine to see if it improved his ptosis and he has not noticed much change in the ptosis.       Constitutional - well developed, well nourished  Eyes - no redness, no discharge, full eye movement, no ptosis, equal pupils, no proptosis, anterior segment grossly normal, normal eyelid closure, normal eyelid position  Respiratory - no cough or labored breathing  Skin - no discoloration or lesions  Neurological - no tremors  Psychiatric - alert & oriented x 3    The rest of a comprehensive physical examination is deferred due to PHE (public health emergency) video visit restrictions    It is my impression that patient has ocular myasthenia.  He is status-post strabismus surgery before the diagnosis of ocular myasthenia gravis was made. He had a recent recurrence of his ptosis and diplopia and self-increased his prednisone to 40 mg/d and has now tapered down to 25 mg daily.  Recommend continued slow taper of prednisone and then consider staying on low dose maintenance dose of prednisone versus switching to a steroid-sparing agent like Cellcept or azathioprine.  Discussed recent studies being done showing benefit of thymectomy.  He will go down on the prednisone in the following fashion weekly: 25, 20, 15, 12.5, 10, 7.5, 5.  At 5 mg, he should go down monthly by 1 mg/d.  If he develops recurrent symptoms, then he will contact me and we can discuss immunosuppression and thymectomy again.          Attending Physician Attestation:  Complete documentation of historical and exam elements from today's encounter can be found in the full encounter summary report (not reduplicated in this progress note).  I personally obtained the chief complaint(s) and history of present illness.  I confirmed and edited as necessary the review of systems,  past medical/surgical history, family history, social history, and examination findings as documented by others; and I examined the patient myself.  I personally reviewed the relevant tests, images, and reports as documented above.  I formulated and edited as necessary the assessment and plan and discussed the findings and management plan with the patient and family. - Arnav Melendrez MD

## 2020-07-06 ENCOUNTER — VIRTUAL VISIT (OUTPATIENT)
Dept: FAMILY MEDICINE | Facility: OTHER | Age: 55
End: 2020-07-06

## 2020-07-06 NOTE — PROGRESS NOTES
"Date: 2020 16:08:03  Clinician: Andre Cameron  Clinician NPI: [id number]  Patient: nieves rosales  Patient : 1965  Patient Address: [address] [place] [id number]  Patient Phone: [phone]  Visit Protocol: URI  Patient Summary:  nieves is a 55 year old ( : 1965 ) male who initiated a Visit for COVID-19 (Coronavirus) evaluation and screening. When asked the question \"Please sign me up to receive news, health information and promotions from [employer].\", nieves responded \"No\".    nieves states his symptoms started suddenly 3-4 days ago.   His symptoms consist of malaise, myalgia, and a cough. He is experiencing mild difficulty breathing with activities but can speak normally in full sentences.   Symptom details   Cough: nieves coughs a few times an hour and his cough is not more bothersome at night. Phlegm does not come into his throat when he coughs. He does not believe his cough is caused by post-nasal drip.    nieves denies having wheezing, nausea, teeth pain, ageusia, diarrhea, vomiting, rhinitis, ear pain, headache, chills, sore throat, enlarged lymph nodes, anosmia, facial pain or pressure, fever, and nasal congestion. He also denies having recent facial or sinus surgery in the past 60 days, taking antibiotic medication in the past month, and double sickening (worsening symptoms after initial improvement).   Precipitating events  He has not recently been exposed to someone with influenza. nieves has been in close contact with the following high risk individuals: people with asthma, heart disease or diabetes, immunocompromised people, and adults 65 or older.   Pertinent COVID-19 (Coronavirus) information  In the past 14 days, nieves has not worked in a congregate living setting.   He either works or volunteers as a healthcare worker or a , or works or volunteers in a healthcare facility. 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 He does not live with a healthcare worker.   nieves has not had a close contact with a laboratory-confirmed COVID-19 patient within 14 days of symptom onset.   Pertinent medical history  nieves does not need a return to work/school note.   Weight: 170 lbs   nieves does not smoke or use smokeless tobacco.   Additional information as reported by the patient (free text): I'm immunosupressed on chronic prednisone   Weight: 170 lbs    MEDICATIONS: albuterol sulfate inhalation, Synthroid oral, prednisone oral, ALLERGIES: NKDA  Clinician Response:  Dear nieves,   Your symptoms show that you may have coronavirus (COVID-19). This illness can cause fever, cough and trouble breathing. Many people get a mild case and get better on their own. Some people can get very sick.  What should I do?  We would like to test you for this virus.   1. Please call [phone] to schedule your visit. Explain that you were referred by [facility] to have a COVID-19 test. Be ready to share your [facility] visit ID number.  The following will serve as your written order for this COVID Test, ordered by me, for the indication of suspected COVID [Z20.828]: The test will be ordered in [employer], our electronic health record, after you are scheduled. It will show as ordered and authorized by Cj Melendrez MD.  Order: COVID-19 (Coronavirus) PCR for SYMPTOMATIC testing from [facility].      2. When it's time for your COVID test:  Stay at least 6 feet away from others. (If someone will drive you to your test, stay in the backseat, as far away from the  as you can.)   Cover your mouth and nose with a mask, tissue or washcloth.  Go straight to the testing site. Don't make any stops on the way there or back.      3.Starting now: Stay home and away from others (self-isolate) until:   You've had no fever---and no medicine that reduces  "fever---for 3 full days (72 hours). And...   Your other symptoms have gotten better. For example, your cough or breathing has improved. And...   At least 10 days have passed since your symptoms started.       During this time, don't leave the house except for testing or medical care.   Stay in your own room, even for meals. Use your own bathroom if you can.   Stay away from others in your home. No hugging, kissing or shaking hands. No visitors.  Don't go to work, school or anywhere else.    Clean \"high touch\" surfaces often (doorknobs, counters, handles, etc.). Use a household cleaning spray or wipes. You'll find a full list of  on the EPA website: www.epa.gov/pesticide-registration/list-n-disinfectants-use-against-sars-cov-2.   Cover your mouth and nose with a mask, tissue or washcloth to avoid spreading germs.  Wash your hands and face often. Use soap and water.  Caregivers in these groups are at risk for severe illness due to COVID-19:  o People 65 years and older  o People who live in a nursing home or long-term care facility  o People with chronic disease (lung, heart, cancer, diabetes, kidney, liver, immunologic)  o People who have a weakened immune system, including those who:   Are in cancer treatment  Take medicine that weakens the immune system, such as corticosteroids  Had a bone marrow or organ transplant  Have an immune deficiency  Have poorly controlled HIV or AIDS  Are obese (body mass index of 40 or higher)  Smoke regularly   o Caregivers should wear gloves while washing dishes, handling laundry and cleaning bedrooms and bathrooms.  o Use caution when washing and drying laundry: Don't shake dirty laundry, and use the warmest water setting that you can.  o For more tips, go to www.cdc.gov/coronavirus/2019-ncov/downloads/10Things.pdf.    4.Sign up for [employer]. We know it's scary to hear that you might have COVID-19. We want to track your symptoms to make sure you're okay over the next 2 " weeks. Please look for an email from [employer]---this is a free, online program that we'll use to keep in touch. To sign up, follow the link in the email. Learn more at [email or web address]  How can I take care of myself?   Get lots of rest. Drink extra fluids (unless a doctor has told you not to).   Take Tylenol (acetaminophen) for fever or pain. If you have liver or kidney problems, ask your family doctor if it's okay to take Tylenol.   Adults can take either:    650 mg (two 325 mg pills) every 4 to 6 hours, or...   1,000 mg (two 500 mg pills) every 8 hours as needed.    Note: Don't take more than 3,000 mg in one day. Acetaminophen is found in many medicines (both prescribed and over-the-counter medicines). Read all labels to be sure you don't take too much.   For children, check the Tylenol bottle for the right dose. The dose is based on the child's age or weight.    If you have other health problems (like cancer, heart failure, an organ transplant or severe kidney disease): Call your specialty clinic if you don't feel better in the next 2 days.       Know when to call 911. Emergency warning signs include:    Trouble breathing or shortness of breath Pain or pressure in the chest that doesn't go away Feeling confused like you haven't felt before, or not being able to wake up Bluish-colored lips or face.  Where can I get more information?   [facility] -- About COVID-19: [email or web address]   CDC -- What to Do If You're Sick: www.cdc.gov/coronavirus/2019-ncov/about/steps-when-sick.html   CDC -- Ending Home Isolation: www.cdc.gov/coronavirus/2019-ncov/hcp/disposition-in-home-patients.html   CDC -- Caring for Someone: www.cdc.gov/coronavirus/2019-ncov/if-you-are-sick/care-for-someone.html   [facility] -- Interim Guidance for Hospital Discharge to Home: www.health.[facility].[place]./diseases/coronavirus/hcp/hospdischarge.pdf   [facility] clinical trials (COVID-19 research studies):  [email or web address]    Below are the COVID-19 hotlines at the [place] [facility] ([facility]). Interpreters are available.    For health questions: Call 215-985-1045 or 1-812.972.7323 (7 a.m. to 7 p.m.) For questions about schools and childcare: Call 249-202-8948 or 1-461.827.8387 (7 a.m. to 7 p.m.)    Diagnosis: Other malaise  Diagnosis ICD: R53.81

## 2020-07-07 ENCOUNTER — AMBULATORY - HEALTHEAST (OUTPATIENT)
Dept: FAMILY MEDICINE | Facility: CLINIC | Age: 55
End: 2020-07-07

## 2020-07-07 DIAGNOSIS — Z20.822 SUSPECTED COVID-19 VIRUS INFECTION: ICD-10-CM

## 2020-10-04 DIAGNOSIS — E03.9 HYPOTHYROIDISM, UNSPECIFIED TYPE: ICD-10-CM

## 2020-10-05 RX ORDER — LEVOTHYROXINE SODIUM 125 UG/1
125 TABLET ORAL DAILY
Qty: 90 TABLET | Refills: 0 | Status: SHIPPED | OUTPATIENT
Start: 2020-10-05 | End: 2021-01-14

## 2020-10-05 NOTE — TELEPHONE ENCOUNTER
levothyroxine (SYNTHROID/LEVOTHROID) 125 MCG tablet  Last Written Prescription Date:  9/30/2019  Last Fill Quantity: 90,   # refills: 3  Last Office Visit : 4/14/2017  Future Office visit:  None  09/30/19  1132    TSH 1.05       Routing refill request to provider for review/approval because:  Last appt 4/14/2017, labs 9/30/2019

## 2020-10-06 ENCOUNTER — TELEPHONE (OUTPATIENT)
Dept: INTERNAL MEDICINE | Facility: CLINIC | Age: 55
End: 2020-10-06

## 2020-10-06 ENCOUNTER — VIRTUAL VISIT (OUTPATIENT)
Dept: NEUROLOGY | Facility: CLINIC | Age: 55
End: 2020-10-06
Attending: OPHTHALMOLOGY
Payer: COMMERCIAL

## 2020-10-06 DIAGNOSIS — Z00.00 HEALTH CARE MAINTENANCE: Primary | ICD-10-CM

## 2020-10-06 DIAGNOSIS — G70.00 OCULAR MYASTHENIA (H): ICD-10-CM

## 2020-10-06 DIAGNOSIS — G70.00 MYASTHENIA GRAVIS WITHOUT EXACERBATION (H): Primary | ICD-10-CM

## 2020-10-06 PROCEDURE — 99207 PR NO CHARGE LOS: CPT | Performed by: PSYCHIATRY & NEUROLOGY

## 2020-10-06 RX ORDER — PREDNISONE 5 MG/1
20 TABLET ORAL DAILY
Qty: 120 TABLET | Refills: 11 | Status: ON HOLD | OUTPATIENT
Start: 2020-10-06 | End: 2021-03-24

## 2020-10-06 NOTE — PROGRESS NOTES
"Leonidas Pratt is a 55 year old male who is being evaluated via a billable video visit.      The patient has been notified of following:     \"This video visit will be conducted via a call between you and your physician/provider. We have found that certain health care needs can be provided without the need for an in-person physical exam.  This service lets us provide the care you need with a video conversation.  If a prescription is necessary we can send it directly to your pharmacy.  If lab work is needed we can place an order for that and you can then stop by our lab to have the test done at a later time.    Video visits are billed at different rates depending on your insurance coverage.  Please reach out to your insurance provider with any questions.    If during the course of the call the physician/provider feels a video visit is not appropriate, you will not be charged for this service.\"    Patient has given verbal consent for Video visit? YES  How would you like to obtain your AVS? mychart  If you are dropped from the video visit, the video invite should be resent to:   Will anyone else be joining your video visit?       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  He was referred to me for an opinion about myasthenia gravis.  I had to inform him that I am not an expert in autoimmune myasthenia gravis and I felt it was important for him to see the right person.  I am trying to arrange an appointment with Dr. Rj Jackson.    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  This was a diagnostic dilemma for some time as is often the case with ocular myasthenia.  He had only mild ptosis.  Initially his antibodies were negative.  He had a single-fiber EMG done by Dr. Carrillo.  By the report his LIZ in the frontalis muscle was 22.9  s which was normal.  He had a course of prednisone which was not helpful.  He then underwent " strabismus surgery which was not helpful.    On repeat testing his antibodies returned positive.  He has acetylcholine receptor binding antibodies were positive at 3.1.  He had had past blocking and modulating antibodies which were negative.  These do not appear to have been repeated.  I do not see anywhere we had a anti-striational antibody.  He had an MRI of his brain which was normal.  He had a chest CT which showedt no  evidence of thymoma.    He did well through these years with intermittent patching and occasional steroid courses.  He is steroid responsive.    Approximately 4 months ago he developed a fixed left ptosis.  This required another course of high-dose prednisone which is being tapered down.  I believe he is on prednisone 5 mg a day at this time with plans for slow taper by 1 mg a week.    Approximately 3 weeks ago he began to developed left hand weakness.  His fourth digit would not extend.  It would group without paresthesia sensory loss or pain.  It occurred when he would wash his hair.  It seemed to worsen he when he did  activities with the left hand.  He also began to feel some calf weakness which was fatigable.  He is an avid exerciser and runs up and down 6 flights of steps.  He does strenuous exercises and has not noticed difficulty with this.    The question now arises as to whether he has generalized myasthenia gravis.  I believe a repeat EMG is being scheduled.    I will attempt to have the patient see Dr. Rj Jackson as soon as possible.    Moreno Moore MD    No charge for this visit.

## 2020-10-06 NOTE — PROGRESS NOTES
Patient has been having left hand weakness.  Concern for progression to generalized myasthenia.  Dr. Melendrez would like patient to see Dr. Jackson or Dr. Moore.  Referral placed.  Refill of prednisone sent to pharmacy.      Kelley Leone on 10/6/2020 at 10:17 AM

## 2020-10-06 NOTE — LETTER
"10/6/2020       RE: Leonidas Pratt  [address]  [address]  [place] [address]     Dear Colleague,    Thank you for referring your patient, Leonidas Pratt, to the [facility] [place] at [facility]. Please see a copy of my visit note below.    Leonidas Pratt is a 55 year old male who is being evaluated via a billable video visit.      The patient has been notified of following:     \"This video visit will be conducted via a call between you and your physician/provider. We have found that certain health care needs can be provided without the need for an in-person physical exam.  This service lets us provide the care you need with a video conversation.  If a prescription is necessary we can send it directly to your pharmacy.  If lab work is needed we can place an order for that and you can then stop by our lab to have the test done at a later time.    Video visits are billed at different rates depending on your insurance coverage.  Please reach out to your insurance provider with any questions.    If during the course of the call the physician/provider feels a video visit is not appropriate, you will not be charged for this service.\"    Patient has given verbal consent for Video visit? YES  How would you like to obtain your AVS? mychart  If you are dropped from the video visit, the video invite should be resent to:   Will anyone else be joining your video visit?       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  He was referred to me for an opinion about myasthenia gravis.  I had to inform him that I am not an expert in autoimmune myasthenia gravis and I felt it was important for him to see the right person.  I am trying to arrange an appointment with Dr. Rj Jackson.    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  This was a diagnostic dilemma for some " time as is often the case with ocular myasthenia.  He had only mild ptosis.  Initially his antibodies were negative.  He had a single-fiber EMG done by Dr. Rod Garner.  By the report his LIZ in the frontalis muscle was 22.9  s which was normal.  He had a course of prednisone which was not helpful.  He then underwent strabismus surgery which was not helpful.    On repeat testing his antibodies returned positive.  He has acetylcholine receptor binding antibodies were positive at 3.1.  He had had past blocking and modulating antibodies which were negative.  These do not appear to have been repeated.  I do not see anywhere we had a anti-striation all antibody.  He had an MRI of his chest which showed an identifiable thymus but no  evidence of thymoma.    He did well through these years with intermittent patching and occasional steroid courses.  He is steroid responsive.    Approximately 4 months ago he developed a fixed left ptosis.  This required another course of high-dose prednisone which is being tapered down.  I believe he is on prednisone 5 mg a day at this time with plans for slow taper by 1 mg a week.    Approximately 3 weeks ago he began to developed left hand weakness.  His fourth digit would not extend.  It would group without paresthesia sensory loss or pain.  It occurred when he would wash his hair.  It seemed to worsen he when he did  activities with the left hand.  He also began to feel some calf weakness which was fatigable.  He is an avid exerciser and runs up and down 6 flights of steps.  He does strenuous exercises and has not noticed difficulty with this.    The question now arises as to whether he has generalized myasthenia gravis.  I believe a repeat EMG is being scheduled.    I will attempt to have the patient see Dr. Rj Jackson as soon as possible.    Moreno Moore MD  No charge for this visit.    Again, thank you for allowing me to participate in the care of your patient.       Sincerely,    Moreno Moore MD

## 2020-10-08 NOTE — TELEPHONE ENCOUNTER
RECORDS RECEIVED FROM: Internal   Date of Appt: 10/12/20   NOTES (FOR ALL VISITS) STATUS DETAILS   OFFICE NOTE from referring provider Internal Dr Nicolás Melendrez @ [facility] Ophthalmology:  4/30/20   OFFICE NOTE from other specialist N/A    DISCHARGE SUMMARY from hospital N/A    DISCHARGE REPORT from the ER N/A    OPERATIVE REPORT N/A    MEDICATION LIST Internal    IMAGING  (FOR ALL VISITS)     EMG N/A    EEG N/A    LUMBAR PUNCTURE N/A    LEO SCAN N/A    DEXA SCAN *NEUROSURGERY* N/A    ULTRASOUND (CAROTID BILAT) *VASCULAR* N/A    MRI (HEAD, NECK, SPINE) Internal [facility]:  MRI Brain 1/23/15   XRAY (SPINE) *NEUROSURGERY* N/A    CT (HEAD, NECK, SPINE) N/A

## 2020-10-12 ENCOUNTER — PRE VISIT (OUTPATIENT)
Dept: NEUROLOGY | Facility: CLINIC | Age: 55
End: 2020-10-12

## 2020-10-12 ENCOUNTER — OFFICE VISIT (OUTPATIENT)
Dept: NEUROLOGY | Facility: CLINIC | Age: 55
End: 2020-10-12
Payer: COMMERCIAL

## 2020-10-12 VITALS
HEIGHT: 70 IN | SYSTOLIC BLOOD PRESSURE: 148 MMHG | OXYGEN SATURATION: 98 % | RESPIRATION RATE: 16 BRPM | HEART RATE: 84 BPM | DIASTOLIC BLOOD PRESSURE: 88 MMHG | WEIGHT: 178 LBS | BODY MASS INDEX: 25.48 KG/M2

## 2020-10-12 DIAGNOSIS — G70.01 MYASTHENIA GRAVIS WITH EXACERBATION (H): ICD-10-CM

## 2020-10-12 DIAGNOSIS — G70.00 MYASTHENIA GRAVIS WITHOUT EXACERBATION (H): Primary | ICD-10-CM

## 2020-10-12 PROCEDURE — 99204 OFFICE O/P NEW MOD 45 MIN: CPT | Performed by: PSYCHIATRY & NEUROLOGY

## 2020-10-12 RX ORDER — PYRIDOSTIGMINE BROMIDE 60 MG/1
60 TABLET ORAL 3 TIMES DAILY
Qty: 90 TABLET | Refills: 3 | Status: SHIPPED | OUTPATIENT
Start: 2020-10-12 | End: 2021-03-17

## 2020-10-12 ASSESSMENT — MIFFLIN-ST. JEOR: SCORE: 1658.65

## 2020-10-12 ASSESSMENT — PAIN SCALES - GENERAL: PAINLEVEL: NO PAIN (0)

## 2020-10-12 NOTE — NURSING NOTE
Chief Complaint   Patient presents with     Myasthenia Gravis     [facility] MYASTHENIA GRAVIS       Griffin Alcaraz, EMT

## 2020-10-12 NOTE — PROGRESS NOTES
Chief Complaint: Myasthenia gravis    History of Present Illness:    Leonidas Pratt is a 55 year old man who I am seeing for myasthenia gravis evaluation. Onset was around 2010. First symptom was diplopia. It fluctuated in severity but never resolved. About 3 years later ptosis started. No dysarthria, dysphagia, SOB, or weakness. 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 In 2015 he was diagnosed with MG and started on prednisone. He started prednisone 60 mg and tapered off over about 3 months. It did not help his symptoms. The thought was that because his symptoms had been present for around 7 years that he probably had irreversible structural damage to the NMJ that was not treatable with immunotherapy. He was off immunotherapy in 2016, and remained off until 12/2018. At that he developed diplopia and started prednisone 60 mg daily. He tapered over a few months. By 2/20 he was taking prednisone 5 mg daily. This time prednisone was helpful to alleviate ptosis and diplopia. He remained on low dose prednisone (around 5 mg) through most of 2019. He was stable during that time.     In April 2020 he then developed worsening ptosis of both eyes. Diplopia worsened. In 4/20 he increased prednisone to 40 mg daily. By June 2020 he was down to 5 mg. In 9/20 he then experienced new weakness. He has noticed that the  in his left hand worsened as did the intrinsic hand strength.  Typing is difficult. He also felt that his legs were weaker. During walking his calves and things felt weaker than they should be. At his baseline he frequently exercises (running, strairs). There was a clear drop off in his typical level of activity. No dysarthria or dysphagia. He might have some mild shortness of breath. Prednisone dose is now 5 mg. He does not take mestinon.     Prior pertinent laboratory work-up:  9/19: Normal/negative Hba1c, TSH  4/15: ACHR ab binding 3.1 (N<0.4)    Prior electrophysiologic work-up:  12/10: RNS of the right facial  nerves showed no abnormal decreament or increment.Single fiber EMG of the right frontalis muscle was subjectively well within the normal range (normal <1.69).  All individual pairs demonstrated normal jitter ranging from 13 ms to 47.2 ms with normal <53.5 ms in the frontalis.  The mean jitter of the 20 pairs in the right frontalis muscle was 22.9 ms (nl< 35.5ms). There was no transmission blocking. There is no electrophysiological evidence for neuromuscular junction disorder.     Prior imagin/9/17 CT C/A/P showed no mediastinal mass    Past Medical History:   Past Medical History:   Diagnosis Date     Asthma      Kidney stone      Myasthenia gravis (H)      Strabismus      Past Surgical History:  Past Surgical History:   Procedure Laterality Date     HERNIA REPAIR       LASER HOLMIUM LITHOTRIPSY URETER(S), INSERT STENT, COMBINED Right 10/15/2014    Procedure: COMBINED CYSTOSCOPY, URETEROSCOPY, LASER HOLMIUM LITHOTRIPSY URETER(S), INSERT STENT;  Surgeon: Thong Bates MD;  Location: [facility] OR     RECESSION RESECTION WITH ADJUSTABLE SUTURE  2012    LLRc 5.0mm on adj. LIRc 4.5mm on adj.     RECESSION RESECTION WITH ADJUSTABLE SUTURE BILATERAL  2012    RSRc 3.0mm; adj. suture     STRABISMUS SURGERY       Family history:    There is no known family history of myopathy or other neuromuscular disorders.He does have a sister with spasmodic dysphonia.     Social History:    He denies tobacco, alcohol, or illicit drug use.     Medical Allergies:    Allergies   Allergen Reactions     Nkda [No Known Drug Allergies]      Current Medications:    Current Outpatient Medications   Medication     albuterol (PROAIR HFA/PROVENTIL HFA/VENTOLIN HFA) 108 (90 BASE) MCG/ACT Inhaler     apraclonidine (IOPIDINE) 0.5 % ophthalmic solution     levothyroxine (SYNTHROID/LEVOTHROID) 125 MCG tablet     predniSONE (DELTASONE) 10 MG tablet     predniSONE (DELTASONE) 5 MG tablet     No current facility-administered medications for  "this visit.      Review of Systems: A complete review of systems was obtained and was negative except for what was noted above.    Physical examination:    BP (!) 148/88   Pulse 84   Resp 16   Ht 1.778 m (5' 10\")   Wt 80.7 kg (178 lb)   SpO2 98%   BMI 25.54 kg/m       General Appearance: NAD    Skin: There are no rashes or other skin lesions.    Musculoskeletal:  There is no scoliosis, lordosis, kyphosis, pes cavus, or hammertoes.    Neurologic examination:    Mental status:  Patient is alert, attentive, and oriented x 3.  Language is coherent and fluent without aphasia.  Memory, comprehension and ability to follow commands were intact.       Cranial nerves:  At baseline there is mild left > right eyelid ptosis. The left eyelid is mid pupil. Eyelid closure mildly weak on the left > right. Diplopia is not present at mid position, but is immediately present with right and left gaze. After 60 seconds of upgaze ptosis worsens. Smile full.      Motor exam: No atrophy or fasciculations. Manual muscle testing revealed the following MRC grade muscle power:   Right Left   Neck flexion 5 5   Neck extension: 5 5   Shoulder ext rotation 5 5   Shoulder abduction:  5 5   Elbow extension: 5 5   Elbow flexion:  5 5   Wrist flexion:  5 5   Wrist extension:  5 5   Finger flexion 5 5   Finger extension 5 5   FDI 5 5   APB 5 5   Hip flexion 5 5   Knee flexion 5 5   Knee extension 5 5   Dorsiflexion 5 5   Plantar flexion 5 5     Complex motor skills: No tremor or ataxia     Sensory exam: Normal pin and vibration in hands and feet    Gait: Narrow and stable     Deep tendon reflexes:   Right Left   Triceps 2 2   Biceps 2 2   Brachioradialis 2 2   Knee jerk 2 2   Ankle jerk 2 2        MG Outcomes MG-ADL QMG MG-Composite  strength (kg) MG medications   10/12/20 7 Not tested 9 Right 35, Left 31 Pred 5 mg daily     Assessment:    Leonidas Pratt is a 55 year old man with ACHR ab positive ocular myasthenia gravis. Although " historically well controlled for the most part with low dose prednisone (or no prednisone), his symptoms over the last few months and especially the last few weeks is disconcerting. We will obtain NCS and RNS of the left upper limb. Although MG can manifest in distal focal areas, it is distinctly unusual. I would like to look for other causes of left hand weakness, including focal neuropathies. If no explanation is found and RNS is normal then SFEMG may be of value. After the NCS/RNS (will complete in 2 days) he will increase prednisone to 40 mg daily and also start mestinon. Response to these medications may be diagnostically helpful. I will see him back in clinic in about a month - although he may begin the prednisone taper before then if needed. At this point I have not recommended adding additional immunotherapy. If we determine that his left arm and leg symptoms are attributable to MG and his cortisteroid requirement remains high then steroid sparing agents vs thymectomy vs both may be options. In the short term IVIG may be a reasonable option if prednisone does not achieve the desired effect. Will hold off on that for now, but will have a low threshold to start IVIG if he worsens. I will discuss this more with him pending his clinical course.    Plan:      1. Prednisone: Increase to 40 mg daily (after EMG)  2. Mestinon: Start 60 mg TID (after EMG). If not effective he will stop after a few days.  3. Repeat CT chest to evaluate for mediastinal mass  4. NCS of left upper arm to look for focal neuropathy (ulnar v median). Also RNS of the left upper limb as well as left facial-nasalis.   5. Potential MG triggers including heat, surgery, and illness. Certain medications and over the counter preparations may also cause worsening of MG symptoms. A list of cautionary medications can be found at: [email or web address]://[email or web address].org/What-is-MG/MG-Management/[email or web address]-Drugs  6. Follow up 1 month   ----    ADDENDUM:   10/15/20:  NCS/RNS dated 10/14/20 showed unequivocal decrement in facial-nasalis and ulnar-ADM. 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 I recommended:   1. Increase prednisone to 40 mg as above.   2. Will repeat the Chest CT as above  3. He will see Dr. Hull for consideration of thymectomy. I discussed pros/cons of thymectomy with him. He understands that the benefits of thymectomy are not immediate, but that this surgery may give him the best opportunity to avoid relapse or steroid sparing immunotherapy over 2-3 years.   4. Anticipate tapering prednisone to 20 mg or less by the time of thymectomy. If unable to achieve satisfactory disease control then with steroid tapering (or if higher dose prednisone is intolerable) then will add IVIG (and taper steroids more quickly).   5. I will see him back in a few weeks.     ADDENDUM:   10/22/20: Received update from him. Improvements are slow, but he thinks legs might be slightly stronger and chest less heavy. He still has a fair amount of facial weakness. Will continue pred 40 for now. He is having trouble sleeping. I gave him a prescription for trazodone to help with this. He also inquires about FMLA. This is a reasonable request. He will drop the paperwork off so that I can complete it.

## 2020-10-12 NOTE — LETTER
10/12/2020       RE: Leonidas Pratt  [address]  [address]  [place] [address]     Dear Colleague,    Thank you for referring your patient, Leonidas Pratt, to the [facility] NEUROLOGY CLINIC [place] at [facility]. Please see a copy of my visit note below.    Chief Complaint: Myasthenia gravis    History of Present Illness:    Leonidas Pratt is a 55 year old man who I am seeing for myasthenia gravis evaluation. Onset was around 2010. First symptom was diplopia. It fluctuated in severity but never resolved. About 3 years later ptosis started. No dysarthria, dysphagia, SOB, or weakness. 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 In 2015 he was diagnosed with MG and started on prednisone. He started prednisone 60 mg and tapered off over about 3 months. It did not help his symptoms. The thought was that because his symptoms had been present for around 7 years that he probably had irreversible structural damage to the NMJ that was not treatable with immunotherapy. He was off immunotherapy in 2016, and remained off until 12/2018. At that he developed diplopia and started prednisone 60 mg daily. He tapered over a few months. By 2/20 he was taking prednisone 5 mg daily. This time prednisone was helpful to alleviate ptosis and diplopia. He remained on low dose prednisone (around 5 mg) through most of 2019. He was stable during that time.     In April 2020 he then developed worsening ptosis of both eyes. Diplopia worsened. In 4/20 he increased prednisone to 40 mg daily. By June 2020 he was down to 5 mg. In 9/20 he then experienced new weakness. He has noticed that the  in his left hand worsened as did the intrinsic hand strength.  Typing is difficult. He also felt that his legs were weaker. During walking his calves and things felt weaker than they should be. At his baseline he frequently exercises (running, strairs). There was a clear drop off in his typical level of  activity. No dysarthria or dysphagia. He might have some mild shortness of breath. Prednisone dose is now 5 mg. He does not take mestinon.     Prior pertinent laboratory work-up:  : Normal/negative Hba1c, TSH  4/15: ACHR ab binding 3.1 (N<0.4)    Prior electrophysiologic work-up:  12/10: RNS of the right facial nerves showed no abnormal decreament or increment.Single fiber EMG of the right frontalis muscle was subjectively well within the normal range (normal <1.69).  All individual pairs demonstrated normal jitter ranging from 13 ms to 47.2 ms with normal <53.5 ms in the frontalis.  The mean jitter of the 20 pairs in the right frontalis muscle was 22.9 ms (nl< 35.5ms). There was no transmission blocking. There is no electrophysiological evidence for neuromuscular junction disorder.     Prior imagin/9/17 CT C/A/P showed no mediastinal mass    Past Medical History:   Past Medical History:   Diagnosis Date     Asthma      Kidney stone      Myasthenia gravis (H)      Strabismus      Past Surgical History:  Past Surgical History:   Procedure Laterality Date     HERNIA REPAIR       LASER HOLMIUM LITHOTRIPSY URETER(S), INSERT STENT, COMBINED Right 10/15/2014    Procedure: COMBINED CYSTOSCOPY, URETEROSCOPY, LASER HOLMIUM LITHOTRIPSY URETER(S), INSERT STENT;  Surgeon: Thong Bates MD;  Location: [facility] OR     RECESSION RESECTION WITH ADJUSTABLE SUTURE  2012    LLRc 5.0mm on adj. LIRc 4.5mm on adj.     RECESSION RESECTION WITH ADJUSTABLE SUTURE BILATERAL  2012    RSRc 3.0mm; adj. suture     STRABISMUS SURGERY       Family history:    There is no known family history of myopathy or other neuromuscular disorders.He does have a sister with spasmodic dysphonia.     Social History:    He denies tobacco, alcohol, or illicit drug use.     Medical Allergies:    Allergies   Allergen Reactions     Nkda [No Known Drug Allergies]      Current Medications:    Current Outpatient Medications   Medication      "albuterol (PROAIR HFA/PROVENTIL HFA/VENTOLIN HFA) 108 (90 BASE) MCG/ACT Inhaler     apraclonidine (IOPIDINE) 0.5 % ophthalmic solution     levothyroxine (SYNTHROID/LEVOTHROID) 125 MCG tablet     predniSONE (DELTASONE) 10 MG tablet     predniSONE (DELTASONE) 5 MG tablet     No current facility-administered medications for this visit.      Review of Systems: A complete review of systems was obtained and was negative except for what was noted above.    Physical examination:    BP (!) 148/88   Pulse 84   Resp 16   Ht 1.778 m (5' 10\")   Wt 80.7 kg (178 lb)   SpO2 98%   BMI 25.54 kg/m       General Appearance: NAD    Skin: There are no rashes or other skin lesions.    Musculoskeletal:  There is no scoliosis, lordosis, kyphosis, pes cavus, or hammertoes.    Neurologic examination:    Mental status:  Patient is alert, attentive, and oriented x 3.  Language is coherent and fluent without aphasia.  Memory, comprehension and ability to follow commands were intact.       Cranial nerves:  At baseline there is mild left > right eyelid ptosis. The left eyelid is mid pupil. Eyelid closure mildly weak on the left > right. Diplopia is not present at mid position, but is immediately present with right and left gaze. After 60 seconds of upgaze ptosis worsens. Smile full.      Motor exam: No atrophy or fasciculations. Manual muscle testing revealed the following MRC grade muscle power:   Right Left   Neck flexion 5 5   Neck extension: 5 5   Shoulder ext rotation 5 5   Shoulder abduction:  5 5   Elbow extension: 5 5   Elbow flexion:  5 5   Wrist flexion:  5 5   Wrist extension:  5 5   Finger flexion 5 5   Finger extension 5 5   FDI 5 5   APB 5 5   Hip flexion 5 5   Knee flexion 5 5   Knee extension 5 5   Dorsiflexion 5 5   Plantar flexion 5 5     Complex motor skills: No tremor or ataxia     Sensory exam: Normal pin and vibration in hands and feet    Gait: Narrow and stable     Deep tendon reflexes:   Right Left   Triceps 2 2 "   Biceps 2 2   Brachioradialis 2 2   Knee jerk 2 2   Ankle jerk 2 2        MG Outcomes MG-ADL QMG MG-Composite  strength (kg) MG medications   10/12/20 7 Not tested 9 Right 35, Left 31 Pred 5 mg daily     Assessment:    Leonidas Pratt is a 55 year old man with ACHR ab positive ocular myasthenia gravis. Although historically well controlled for the most part with low dose prednisone (or no prednisone), his symptoms over the last few months and especially the last few weeks is disconcerting. We will obtain NCS and RNS of the left upper limb. Although MG can manifest in distal focal areas, it is distinctly unusual. I would like to look for other causes of left hand weakness, including focal neuropathies. If no explanation is found and RNS is normal then SFEMG may be of value. After the NCS/RNS (will complete in 2 days) he will increase prednisone to 40 mg daily and also start mestinon. Response to these medications may be diagnostically helpful. I will see him back in clinic in about a month - although he may begin the prednisone taper before then if needed. At this point I have not recommended adding additional immunotherapy. If we determine that his left arm and leg symptoms are attributable to MG and his cortisteroid requirement remains high then steroid sparing agents vs thymectomy vs both may be options. In the short term IVIG may be a reasonable option if prednisone does not achieve the desired effect. Will hold off on that for now, but will have a low threshold to start IVIG if he worsens. I will discuss this more with him pending his clinical course.    Plan:      1. Prednisone: Increase to 40 mg daily (after EMG)  2. Mestinon: Start 60 mg TID (after EMG). If not effective he will stop after a few days.  3. Repeat CT chest to evaluate for mediastinal mass  4. NCS of left upper arm to look for focal neuropathy (ulnar v median). Also RNS of the left upper limb as well as left facial-nasalis.   5. Potential MG  triggers including heat, surgery, and illness. Certain medications and over the counter preparations may also cause worsening of MG symptoms. A list of cautionary medications can be found at: [email or web address]-[email or web address]-[email or web address]-Management/[email or web address]-Drugs  6. Follow up 1 month   ----      Again, thank you for allowing me to participate in the care of your patient.      Sincerely,    Rj Jackson MD

## 2020-10-12 NOTE — LETTER
10/12/2020       RE: Leonidas Pratt  [address]  [address]  [place] [address]     Dear Colleague,    Thank you for referring your patient, Leonidas Pratt, to the [facility] NEUROLOGY CLINIC [place] at [facility]. Please see a copy of my visit note below.    Chief Complaint: Myasthenia gravis    History of Present Illness:    Leonidas Pratt is a 55 year old man who I am seeing for myasthenia gravis evaluation. Onset was around 2010. First symptom was diplopia. It fluctuated in severity but never resolved. About 3 years later ptosis started. No dysarthria, dysphagia, SOB, or weakness. 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 In 2015 he was diagnosed with MG and started on prednisone. He started prednisone 60 mg and tapered off over about 3 months. It did not help his symptoms. The thought was that because his symptoms had been present for around 7 years that he probably had irreversible structural damage to the NMJ that was not treatable with immunotherapy. He was off immunotherapy in 2016, and remained off until 12/2018. At that he developed diplopia and started prednisone 60 mg daily. He tapered over a few months. By 2/20 he was taking prednisone 5 mg daily. This time prednisone was helpful to alleviate ptosis and diplopia. He remained on low dose prednisone (around 5 mg) through most of 2019. He was stable during that time.     In April 2020 he then developed worsening ptosis of both eyes. Diplopia worsened. In 4/20 he increased prednisone to 40 mg daily. By June 2020 he was down to 5 mg. In 9/20 he then experienced new weakness. He has noticed that the  in his left hand worsened as did the intrinsic hand strength.  Typing is difficult. He also felt that his legs were weaker. During walking his calves and things felt weaker than they should be. At his baseline he frequently exercises (running, strairs). There was a clear drop off in his typical level of  activity. No dysarthria or dysphagia. He might have some mild shortness of breath. Prednisone dose is now 5 mg. He does not take mestinon.       Prior pertinent laboratory work-up:  : Normal/negative Hba1c, TSH  4/15: ACHR ab binding 3.1 (N<0.4)    Prior electrophysiologic work-up:  12/10: RNS of the right facial nerves showed no abnormal decreament or increment.Single fiber EMG of the right frontalis muscle was subjectively well within the normal range (normal <1.69).  All individual pairs demonstrated normal jitter ranging from 13 ms to 47.2 ms with normal <53.5 ms in the frontalis.  The mean jitter of the 20 pairs in the right frontalis muscle was 22.9 ms (nl< 35.5ms). There was no transmission blocking. There is no electrophysiological evidence for neuromuscular junction disorder.     Prior imagin/9/17 CT C/A/P showed no mediastinal mass    Past Medical History:   Past Medical History:   Diagnosis Date     Asthma      Kidney stone      Myasthenia gravis (H)      Strabismus      Past Surgical History:  Past Surgical History:   Procedure Laterality Date     HERNIA REPAIR       LASER HOLMIUM LITHOTRIPSY URETER(S), INSERT STENT, COMBINED Right 10/15/2014    Procedure: COMBINED CYSTOSCOPY, URETEROSCOPY, LASER HOLMIUM LITHOTRIPSY URETER(S), INSERT STENT;  Surgeon: Thong Bates MD;  Location[facility] OR     RECESSION RESECTION WITH ADJUSTABLE SUTURE  2012    LLRc 5.0mm on adj. LIRc 4.5mm on adj.     RECESSION RESECTION WITH ADJUSTABLE SUTURE BILATERAL  2012    RSRc 3.0mm; adj. suture     STRABISMUS SURGERY       Family history:    There is no known family history of myopathy or other neuromuscular disorders.He does have a sister with spasmodic dysphonia.     Social History:    He denies tobacco, alcohol, or illicit drug use.     Medical Allergies:    Allergies   Allergen Reactions     Nkda [No Known Drug Allergies]      Current Medications:    Current Outpatient Medications   Medication      "albuterol (PROAIR HFA/PROVENTIL HFA/VENTOLIN HFA) 108 (90 BASE) MCG/ACT Inhaler     apraclonidine (IOPIDINE) 0.5 % ophthalmic solution     levothyroxine (SYNTHROID/LEVOTHROID) 125 MCG tablet     predniSONE (DELTASONE) 10 MG tablet     predniSONE (DELTASONE) 5 MG tablet     No current facility-administered medications for this visit.      Review of Systems: A complete review of systems was obtained and was negative except for what was noted above.    Physical examination:    BP (!) 148/88   Pulse 84   Resp 16   Ht 1.778 m (5' 10\")   Wt 80.7 kg (178 lb)   SpO2 98%   BMI 25.54 kg/m       General Appearance: NAD    Skin: There are no rashes or other skin lesions.    Musculoskeletal:  There is no scoliosis, lordosis, kyphosis, pes cavus, or hammertoes.    Neurologic examination:    Mental status:  Patient is alert, attentive, and oriented x 3.  Language is coherent and fluent without aphasia.  Memory, comprehension and ability to follow commands were intact.       Cranial nerves:  At baseline there is mild left > right eyelid ptosis. The left eyelid is mid pupil. Eyelid closure mildly weak on the left > right. Diplopia is not present at mid position, but is immediately present with right and left gaze. After 60 seconds of upgaze ptosis worsens. Smile full.      Motor exam: No atrophy or fasciculations. Manual muscle testing revealed the following MRC grade muscle power:   Right Left   Neck flexion 5 5   Neck extension: 5 5   Shoulder ext rotation 5 5   Shoulder abduction:  5 5   Elbow extension: 5 5   Elbow flexion:  5 5   Wrist flexion:  5 5   Wrist extension:  5 5   Finger flexion 5 5   Finger extension 5 5   FDI 5 5   APB 5 5   Hip flexion 5 5   Knee flexion 5 5   Knee extension 5 5   Dorsiflexion 5 5   Plantar flexion 5 5     Complex motor skills: No tremor or ataxia     Sensory exam: Normal pin and vibration in hands and feet    Gait: Narrow and stable     Deep tendon reflexes:   Right Left   Triceps 2 2 "   Biceps 2 2   Brachioradialis 2 2   Knee jerk 2 2   Ankle jerk 2 2        MG Outcomes MG-ADL QMG MG-Composite  strength (kg) MG medications   10/12/20 7 Not tested 9 Right 35, Left 31 Pred 5 mg daily     Assessment:    Leonidas Pratt is a 55 year old man with ACHR ab positive ocular myasthenia gravis. Although historically well controlled for the most part with low dose prednisone (or no prednisone), his symptoms over the last few months and especially the last few weeks is disconcerting. We will obtain NCS and RNS of the left upper limb. Although MG can manifest in distal focal areas, it is distinctly unusual. I would like to look for other causes of left hand weakness, including focal neuropathies. If no explanation is found and RNS is normal then SFEMG may be of value. After the NCS/RNS (will complete in 2 days) he will increase prednisone to 40 mg daily and also start mestinon. Response to these medications may be diagnostically helpful. I will see him back in clinic in about a month - although he may begin the prednisone taper before then if needed. At this point I have not recommended adding additional immunotherapy. If we determine that his left arm and leg symptoms are attributable to MG and his cortisteroid requirement remains high then steroid sparing agents vs thymectomy vs both may be options. In the short term IVIG may be a reasonable option if prednisone does not achieve the desired effect. Will hold off on that for now, but will have a low threshold to start IVIG if he worsens. I will discuss this more with him pending his clinical course.    Plan:      1. Prednisone: Increase to 40 mg daily (after EMG)  2. Mestinon: Start 60 mg TID (after EMG). If not effective he will stop after a few days.  3. Repeat CT chest to evaluate for mediastinal mass  4. NCS of left upper arm to look for focal neuropathy (ulnar v median). Also RNS of the left upper limb as well as left facial-nasalis.   5. Potential MG  triggers including heat, surgery, and illness. Certain medications and over the counter preparations may also cause worsening of MG symptoms. A list of cautionary medications can be found at: [email or web address]-Management/[email or web address]-Drugs  6. Follow up 1 month   ----  Again, thank you for allowing me to participate in the care of your patient.  Sincerely,    Rj Jackson MD

## 2020-10-14 ENCOUNTER — OFFICE VISIT (OUTPATIENT)
Dept: NEUROLOGY | Facility: CLINIC | Age: 55
End: 2020-10-14
Payer: COMMERCIAL

## 2020-10-14 DIAGNOSIS — Z00.00 HEALTH CARE MAINTENANCE: ICD-10-CM

## 2020-10-14 DIAGNOSIS — G70.01 MYASTHENIA GRAVIS WITH EXACERBATION (H): Primary | ICD-10-CM

## 2020-10-14 LAB
CHOLEST SERPL-MCNC: 266 MG/DL
HBA1C MFR BLD: 5.2 % (ref 0–5.6)
HDLC SERPL-MCNC: 52 MG/DL
LDLC SERPL CALC-MCNC: 173 MG/DL
NONHDLC SERPL-MCNC: 214 MG/DL
TRIGL SERPL-MCNC: 205 MG/DL

## 2020-10-14 PROCEDURE — 36415 COLL VENOUS BLD VENIPUNCTURE: CPT | Performed by: PATHOLOGY

## 2020-10-14 PROCEDURE — 83036 HEMOGLOBIN GLYCOSYLATED A1C: CPT | Mod: 90 | Performed by: PATHOLOGY

## 2020-10-14 PROCEDURE — 95937 NEUROMUSCULAR JUNCTION TEST: CPT | Performed by: PSYCHIATRY & NEUROLOGY

## 2020-10-14 PROCEDURE — 80061 LIPID PANEL: CPT | Performed by: PATHOLOGY

## 2020-10-14 PROCEDURE — 95909 NRV CNDJ TST 5-6 STUDIES: CPT | Performed by: PSYCHIATRY & NEUROLOGY

## 2020-10-14 RX ORDER — PREDNISONE 10 MG/1
10 TABLET ORAL DAILY
Qty: 120 TABLET | Refills: 1 | Status: SHIPPED | OUTPATIENT
Start: 2020-10-14 | End: 2020-12-10

## 2020-10-14 NOTE — PROGRESS NOTES
[facility]  Electrodiagnostic Laboratory    Nerve Conduction & EMG Report          Patient:       Leonidas Pratt  Patient ID:    [id number]  Gender:        Male  YOB: 1965  Age:           55 Years 8 Months        History & Examination:  55 year old man with ocular myasthenia gravis, now experiencing left hand weakness. Evaluate for focal neuropathy vs NMJ transmission disorder.     Techniques: Motor and sensory conduction studies were done with surface recording electrodes.     Results:  Nerve conduction studies:  1. Left median-D2 and ulnar-D5 sensory responses are normal.   2. Left median-APB and ulnar-ADM motor responses are normal.   3. Left ulnar-ADM and facial nasalis 3 Hz RNS show decrement of > 10%. Decrement repaired with brief (10 second) maximum exercise.     Interpretation:  This is an abnormal study. 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 There is no evidence of a focal neuropathy affecting the left upper limb on the basis of this study.     Rj Jackson MD  Department of Neurology            Sensory NCS      Nerve / Sites Rec. Site Onset Peak NP Amp Ref. PP Amp Dist Hadley Ref. Temp     ms ms  V  V  V cm m/s m/s  C   L MEDIAN - Dig II Anti      Wrist Dig II 2.40 3.39 25.6 10.0 54.0 14 58.4 48.0 32.6   L ULNAR - Dig V Anti      Wrist Dig V 2.19 3.28 28.7 8.0 48.6 12.5 57.1 48.0 32.5       Motor NCS      Nerve / Sites Rec. Site Lat Ref. Amp Ref. Rel Amp Dist Hadley Ref. Dur. Area Temp.     ms ms mV mV % cm m/s m/s ms %  C   L MEDIAN - APB      Wrist APB 3.18 4.40 12.3 5.0 100 8   6.04 100 32.5      Elbow APB 7.08  12.2  99.4 23.5 60.2 48.0 6.20 98 32.6   L ULNAR - ADM      Wrist ADM 2.92 3.50 10.5 5.0 100 8   7.81 100 32.5      B.Elbow ADM 6.20  10.1  96.5 19 57.9 48.0 7.92 96.2 32.4      A.Elbow ADM 8.13  10.0  95.9 11 57.1 48.0 8.07 92.6 32.4   L ULNAR - FDI      Wrist  FDI 3.59  7.3  100    5.94 100 32.5      B.Elbow FDI 6.98  7.0  96.2 19.5 57.6  5.89 94.9 32.4      A.Elbow FDI 8.75  6.7  92.8 10 56.5  6.04 95.1 32.4       Rep Nerve Stim 6      Muscle / Train Time Rate Amp 4-1 Fac Ampl Area 4-1 Fac Area     pps mV % % mVms % %   L NASALIS   Baseline  3 3.5 -18.9 100 10.4 -17 100   Post Exercise 10 sec 0:01:09 3 3.6 -11.4 104 11.8 -11.1 113   Post Exercise 10 sec 0:01:39 3 3.7 -16.8 105 11.8 -11.3 113   L ABD DIG MIN (UL)   Baseline 0:00:00 3 5.1 -25.1 100 21.9 -18.6 100   Post Exercise 10 sec 0:00:48 3 7.9 -8.6 155 32.2 -3.2 147

## 2020-10-14 NOTE — LETTER
10/14/2020       RE: Leonidas Pratt  [address]  [address]  [place] [address]     Dear Colleague,    Thank you for referring your patient, Leonidas Pratt, to the [facility] EMG CLINIC [place] at [facility]. Please see a copy of my visit note below.        [facility]  Electrodiagnostic Laboratory    Nerve Conduction & EMG Report          Patient:       Leonidas Pratt  Patient ID:    [id number]  Gender:        Male  YOB: 1965  Age:           55 Years 8 Months        History & Examination:  55 year old man with ocular myasthenia gravis, now experiencing left hand weakness. Evaluate for focal neuropathy vs NMJ transmission disorder.     Techniques: Motor and sensory conduction studies were done with surface recording electrodes.     Results:  Nerve conduction studies:  1. Left median-D2 and ulnar-D5 sensory responses are normal.   2. Left median-APB and ulnar-ADM motor responses are normal.   3. Left ulnar-ADM and facial nasalis 3 Hz RNS show decrement of > 10%. Decrement repaired with brief (10 second) maximum exercise.     Interpretation:  This is an abnormal study. 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 There is no evidence of a focal neuropathy affecting the left upper limb on the basis of this study.     Rj Jackson MD  Department of Neurology            Sensory NCS      Nerve / Sites Rec. Site Onset Peak NP Amp Ref. PP Amp Dist Hadley Ref. Temp     ms ms  V  V  V cm m/s m/s  C   L MEDIAN - Dig II Anti      Wrist Dig II 2.40 3.39 25.6 10.0 54.0 14 58.4 48.0 32.6   L ULNAR - Dig V Anti      Wrist Dig V 2.19 3.28 28.7 8.0 48.6 12.5 57.1 48.0 32.5       Motor NCS      Nerve / Sites Rec. Site Lat Ref. Amp Ref. Rel Amp Dist Hadley Ref. Dur. Area Temp.     ms ms mV mV % cm m/s m/s ms %  C   L MEDIAN - APB      Wrist APB 3.18 4.40  12.3 5.0 100 8   6.04 100 32.5      Elbow APB 7.08  12.2  99.4 23.5 60.2 48.0 6.20 98 32.6   L ULNAR - ADM      Wrist ADM 2.92 3.50 10.5 5.0 100 8   7.81 100 32.5      B.Elbow ADM 6.20  10.1  96.5 19 57.9 48.0 7.92 96.2 32.4      A.Elbow ADM 8.13  10.0  95.9 11 57.1 48.0 8.07 92.6 32.4   L ULNAR - FDI      Wrist FDI 3.59  7.3  100    5.94 100 32.5      B.Elbow FDI 6.98  7.0  96.2 19.5 57.6  5.89 94.9 32.4      A.Elbow FDI 8.75  6.7  92.8 10 56.5  6.04 95.1 32.4       Rep Nerve Stim 6      Muscle / Train Time Rate Amp 4-1 Fac Ampl Area 4-1 Fac Area     pps mV % % mVms % %   L NASALIS   Baseline  3 3.5 -18.9 100 10.4 -17 100   Post Exercise 10 sec 0:01:09 3 3.6 -11.4 104 11.8 -11.1 113   Post Exercise 10 sec 0:01:39 3 3.7 -16.8 105 11.8 -11.3 113   L ABD DIG MIN (UL)   Baseline 0:00:00 3 5.1 -25.1 100 21.9 -18.6 100   Post Exercise 10 sec 0:00:48 3 7.9 -8.6 155 32.2 -3.2 147                            Again, thank you for allowing me to participate in the care of your patient.      Sincerely,    Rj Jackson MD

## 2020-10-15 ENCOUNTER — PRE VISIT (OUTPATIENT)
Dept: SURGERY | Facility: CLINIC | Age: 55
End: 2020-10-15

## 2020-10-15 NOTE — TELEPHONE ENCOUNTER
ONCOLOGY INTAKE: Records Information      APPT INFORMATION:  Referring provider:  Dr. Jackson  Referring provider s clinic:  [facility]  Reason for visit/diagnosis:  Myasthenia gravis with exacerbation  Has patient been notified of appointment date and time?: yes    RECORDS INFORMATION:  Were the records received with the referral (via [employer])? no    Has patient been seen for any external appt for this diagnosis? no    If yes, where? n/a    Has patient had any imaging or procedures outside of [place] for this condition? no      If Yes, where? n/a    ADDITIONAL INFORMATION:  none

## 2020-10-16 NOTE — TELEPHONE ENCOUNTER
RECORDS STATUS - ALL OTHER DIAGNOSIS      RECORDS RECEIVED FROM: [facility] - Internal Referral   DATE RECEIVED: 10/16/20   NOTES STATUS DETAILS   OFFICE NOTE from referring provider Epic 10/14/20: Dr. Jackson    Also seen:  Dr Nicolás Melendrez Ophthalmology:  4/30/20   OFFICE NOTE from medical oncologist     DISCHARGE SUMMARY from hospital     DISCHARGE REPORT from the ER     OPERATIVE REPORT     MEDICATION LIST     CLINICAL TRIAL TREATMENTS TO DATE     LABS     PATHOLOGY REPORTS     ANYTHING RELATED TO DIAGNOSIS Epic 10/14/20   GENONOMIC TESTING     TYPE:     IMAGING (NEED IMAGES & REPORT)     CT SCANS     MRI     MAMMO     ULTRASOUND     PET

## 2020-10-22 RX ORDER — TRAZODONE HYDROCHLORIDE 50 MG/1
50 TABLET, FILM COATED ORAL AT BEDTIME
Qty: 30 TABLET | Refills: 1 | Status: SHIPPED | OUTPATIENT
Start: 2020-10-22 | End: 2021-02-17

## 2020-10-29 ENCOUNTER — ANCILLARY PROCEDURE (OUTPATIENT)
Dept: CT IMAGING | Facility: CLINIC | Age: 55
End: 2020-10-29
Attending: PSYCHIATRY & NEUROLOGY
Payer: COMMERCIAL

## 2020-10-29 DIAGNOSIS — G70.01 MYASTHENIA GRAVIS WITH EXACERBATION (H): ICD-10-CM

## 2020-10-29 PROCEDURE — 71260 CT THORAX DX C+: CPT | Mod: GC | Performed by: RADIOLOGY

## 2020-10-29 RX ORDER — IOPAMIDOL 755 MG/ML
88 INJECTION, SOLUTION INTRAVASCULAR ONCE
Status: COMPLETED | OUTPATIENT
Start: 2020-10-29 | End: 2020-10-29

## 2020-10-29 RX ADMIN — IOPAMIDOL 88 ML: 755 INJECTION, SOLUTION INTRAVASCULAR at 09:21

## 2020-11-09 NOTE — PROGRESS NOTES
"Leonidas Pratt is a 55 year old male who is being evaluated via a billable video visit.      The patient has been notified of following:     \"This video visit will be conducted via a call between you and your physician/provider. We have found that certain health care needs can be provided without the need for an in-person physical exam.  This service lets us provide the care you need with a video conversation.  If a prescription is necessary we can send it directly to your pharmacy.  If lab work is needed we can place an order for that and you can then stop by our lab to have the test done at a later time.    Video visits are billed at different rates depending on your insurance coverage.  Please reach out to your insurance provider with any questions.    If during the course of the call the physician/provider feels a video visit is not appropriate, you will not be charged for this service.\"    Patient has given verbal consent for Video visit? Yes  How would you like to obtain your AVS? MyChart  If you are dropped from the video visit, the video invite should be resent to: Text to cell phone: [phone]  Will anyone else be joining your video visit? No       YADIRA Menon      Video-Visit Details    Type of service:  Video Visit    Video Start Time: 16:20  Video End Time: 16:33    Originating Location (pt. Location): Home    Distant Location (provider location):  [facility]     Platform used for Video Visit: [facility]              THORACIC SURGERY - NEW PATIENT OFFICE VISIT       I saw Dr. Pratt at Dr. Jackson s request in consultation for the evaluation and treatment of non-thymomatous myasthenia gravis.    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  He had only occular symptoms for 10 years, and over the last 6 months he started developing generalized symptoms (extremity weakness, difficulty swallowing, and severe ptosis). " He is currently tapering his prednisone to 25mg over the last 5 weeks. He has not required IVIG or cellcept, and has been able to taper the prednisone    Previsit Tests   CT Chest (10/29/2020)  Small soft tissue density in the anterosuperior mediastinum, presumed residual thymus    PMH    Past Medical History:   Diagnosis Date     Asthma      Kidney stone      Myasthenia gravis (H)      Strabismus     Hashimoto's    PSH    Past Surgical History:   Procedure Laterality Date     HERNIA REPAIR       LASER HOLMIUM LITHOTRIPSY URETER(S), INSERT STENT, COMBINED Right 10/15/2014    Procedure: COMBINED CYSTOSCOPY, URETEROSCOPY, LASER HOLMIUM LITHOTRIPSY URETER(S), INSERT STENT;  Surgeon: Thong Bates MD;  [facility]     RECESSION RESECTION WITH ADJUSTABLE SUTURE  9/4/2012    LLRc 5.0mm on adj. LIRc 4.5mm on adj.     RECESSION RESECTION WITH ADJUSTABLE SUTURE BILATERAL  12/18/2012    RSRc 3.0mm; adj. suture     STRABISMUS SURGERY      Hernia repair as infant     Home Medications:  Prednisone 25mg  Pyridostigmine  Trazodone  Levothyroxine    ETOH: None  Tobacco: Never    Physical examination  He appears of his stated age on video, he has no trouble talking, and his eye lids appeared normal.    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    IMPRESSION (G70.01) Myasthenia gravis with exacerbation (H)     55 year old year-old male with non-thymomatous myasthenia gravis.    PLAN  I spent a total of 20 minutes with Dr. Pratt, more than 50% of which were spent in counseling, coordination of care, and face-to-face time. I reviewed the plan as follows:  Procedure planned: subxiphoid bilateral VATS thymectomy. We will wait till next year until we have a better understanding of the status of the pandemic. He will contact us.  All questions were answered and Leonidas Pratt and present family were in agreement with the plan.  I appreciate the opportunity to participate  in the care of your patient and will keep you updated.  Sincerely,

## 2020-11-18 ENCOUNTER — OFFICE VISIT (OUTPATIENT)
Dept: NEUROLOGY | Facility: CLINIC | Age: 55
End: 2020-11-18
Payer: COMMERCIAL

## 2020-11-18 ENCOUNTER — VIRTUAL VISIT (OUTPATIENT)
Dept: SURGERY | Facility: CLINIC | Age: 55
End: 2020-11-18
Attending: PSYCHIATRY & NEUROLOGY
Payer: COMMERCIAL

## 2020-11-18 VITALS
RESPIRATION RATE: 16 BRPM | SYSTOLIC BLOOD PRESSURE: 134 MMHG | HEART RATE: 84 BPM | DIASTOLIC BLOOD PRESSURE: 91 MMHG | OXYGEN SATURATION: 98 %

## 2020-11-18 DIAGNOSIS — G70.00 MYASTHENIA GRAVIS WITHOUT EXACERBATION (H): Primary | ICD-10-CM

## 2020-11-18 DIAGNOSIS — G70.01 MYASTHENIA GRAVIS WITH EXACERBATION (H): ICD-10-CM

## 2020-11-18 PROCEDURE — 99214 OFFICE O/P EST MOD 30 MIN: CPT | Performed by: PSYCHIATRY & NEUROLOGY

## 2020-11-18 PROCEDURE — 99202 OFFICE O/P NEW SF 15 MIN: CPT | Mod: 95 | Performed by: THORACIC SURGERY (CARDIOTHORACIC VASCULAR SURGERY)

## 2020-11-18 PROCEDURE — 999N001193 HC VIDEO/TELEPHONE VISIT; NO CHARGE

## 2020-11-18 NOTE — LETTER
11/18/2020       RE: Leonidas Pratt  [address]  [address]  [place] [address]     Dear Colleague,    Thank you for referring your patient, Leonidas Pratt, to the [facility] [place] [facility]. Please see a copy of my visit note below.    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 Onset was around 2010. First symptom was diplopia. About 3 years later ptosis started. No dysarthria, dysphagia, SOB, or weakness. 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 In 2015 he was diagnosed with MG and started on prednisone. He started prednisone 60 mg and tapered off over about 3 months. 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 He was off immunotherapy in 2016, and remained off until 12/2018. At that end of 2018 he developed diplopia and restarted prednisone 60 mg daily. He tapered over a few months. By 2/20 he was taking prednisone 5 mg daily. This time prednisone was helpful to alleviate ptosis and diplopia. He remained on low dose prednisone (around 5 mg) through most of 2019. He was stable during that time. In April 2020 he then developed worsening ptosis and diplopia of both eyes. In 4/20 he increased prednisone to 40 mg daily. By June 2020 he was back down to 5 mg. In 9/20 he then experienced new weakness in his left hand. Typing was difficult. He also felt that his legs were weaker. There was a clear drop off in his typical level of activity and exercise. No dysarthria or dysphagia. In 10/20 prednisone increased to 40 mg daily.     Interval history:   I last saw him 10/12/20. After that visit RNS was performed. There was clear decrement in both the facial-nasalis and ulnar-ADM nerves. The  presence of decrement at ADM was a clear indicator that his MG was indeed generalized. He subsequently increased prednisone from 5 mg to 40 mg. Over about a week he noticed some improvements in lower limbs strength and his level of activity. He still has ptosis, but improved. He still has some diplopia at the end of the day, but improved. Hand strength is improved from a month ago, but not back to normal. He sometimes notices difficulty with prolonged chewing. No dysarthria or dysphagia. No shortness of breath.  He has tapered prednisone slowly down to 25 mg daily. Prednisone has a major impact on his sleep - but otherwise he tolerates it reasonably well. He did take mestinon a couple times. He did not notice any clear improvements in strength or ptosis and so he did not continue it.     Prior pertinent laboratory work-up:  : Normal/negative Hba1c, TSH  4/15: ACHR ab binding 3.1 (N<0.4)    Prior electrophysiologic work-up:  12/10: RNS of the right facial nerves showed no abnormal decreament or increment.Single fiber EMG of the right frontalis muscle was subjectively well within the normal range (normal <1.69).  All individual pairs demonstrated normal jitter ranging from 13 ms to 47.2 ms with normal <53.5 ms in the frontalis.  The mean jitter of the 20 pairs in the right frontalis muscle was 22.9 ms (nl< 35.5ms). There was no transmission blocking. There is no electrophysiological evidence for neuromuscular junction disorder.  10/14/20: NCS/RNSshowed unequivocal decrement in facial-nasalis and ulnar-ADM.     Prior imagin/9/17 CT C/A/P showed no mediastinal mass  10/29/20: CT chest showed unchanged tiny soft tissue density in the anterior superior mediastinum, presumably residual thymus. No enlargement to suggest Thymoma.    Past Medical History:   Past Medical History:   Diagnosis Date     Asthma      Kidney stone      Myasthenia gravis (H)      Strabismus      Past Surgical History:  Past Surgical  History:   Procedure Laterality Date     HERNIA REPAIR       LASER HOLMIUM LITHOTRIPSY URETER(S), INSERT STENT, COMBINED Right 10/15/2014    Procedure: COMBINED CYSTOSCOPY, URETEROSCOPY, LASER HOLMIUM LITHOTRIPSY URETER(S), INSERT STENT;  Surgeon: Thong Bates MD;  [facility]     RECESSION RESECTION WITH ADJUSTABLE SUTURE  9/4/2012    LLRc 5.0mm on adj. LIRc 4.5mm on adj.     RECESSION RESECTION WITH ADJUSTABLE SUTURE BILATERAL  12/18/2012    RSRc 3.0mm; adj. suture     STRABISMUS SURGERY       Family history:    There is no known family history of myopathy or other neuromuscular disorders.He does have a sister with spasmodic dysphonia.     Social History:    He denies tobacco, alcohol, or illicit drug use.     Medical Allergies:    Allergies   Allergen Reactions     Nkda [No Known Drug Allergies]      Current Medications:    Current Outpatient Medications   Medication     albuterol (PROAIR HFA/PROVENTIL HFA/VENTOLIN HFA) 108 (90 BASE) MCG/ACT Inhaler     apraclonidine (IOPIDINE) 0.5 % ophthalmic solution     levothyroxine (SYNTHROID/LEVOTHROID) 125 MCG tablet     predniSONE (DELTASONE) 10 MG tablet     predniSONE (DELTASONE) 10 MG tablet     predniSONE (DELTASONE) 5 MG tablet     pyridostigmine (MESTINON) 60 MG tablet     traZODone (DESYREL) 50 MG tablet     No current facility-administered medications for this visit.      Review of Systems: A review of systems was obtained and was negative except for what was noted above.    Physical examination:    BP (!) 134/91   Pulse 84   Resp 16   SpO2 98%      General Appearance: NAD    Skin: There are no rashes or other skin lesions.     Neurologic examination:    Mental status:  Patient is alert, attentive, and oriented x 3.  Language is coherent and fluent without aphasia.  Memory, comprehension and ability to follow commands were intact.       Cranial nerves:  No ptosis at baseline. Eye closure is mildly weak. No diplopia t mid position. With upgaze  diplopia immediately emerges. After about 10 seconds of upgaze mild right > left ptosis emerges. Smile full.      Motor exam: No atrophy or fasciculations. Manual muscle testing revealed the following MRC grade muscle power:   Right Left   Neck flexion 5 5   Neck extension: 5 5   Shoulder ext rotation 5 5   Shoulder abduction:  5 5   Elbow extension: 5 5   Elbow flexion:  5 5   Wrist flexion:  5 5   Wrist extension:  5 5   Finger flexion 5 5   Finger extension 5 5   FDI 5 5   Hip flexion 5 5   Knee flexion 5 5   Knee extension 5 5   Dorsiflexion 5 5   Plantar flexion 5 5     Complex motor skills: No tremor or ataxia     Sensory exam: Normal sensation in hands and feet    Gait: Narrow and stable     Deep tendon reflexes:   Right Left   Triceps 2 2   Biceps 2 2   Brachioradialis 2 2   Knee jerk 2 2   Ankle jerk 2 2        MG Outcomes MG-ADL QMG MG-Composite  strength (kg) MG medications   10/12/20 7 Not tested 9 Right 35, Left 31 Pred 5 mg daily   11/18/20 5 Not tested 6 Right 40, Left 39 Pred 25 mg daily     Assessment:    Leonidas Pratt is a 55 year old man with ACHR ab positive generalized non-thymomatous myasthenia gravis. Although he was historically well controlled between 2010 and 2018 on little to no immunotherapy, his clinical course over the last year - and especially last few months has been more unstable. It is reassuring that he has made improvements with higher dose prednisone, although he is not yet back to baseline. We discussed the short and long term treatment plan. For the time being our approach will be to slowly dose optimize prednisone as below. We discussed the role of thymectomy. The randomized trial published in the NEJM (2016) showed that patients who underwent thymectomy had a lower time-weighted average QMG score over a 3-year period and had a lower average requirement for prednisone. Fewer patients in the thymectomy group required immunosuppression with azathioprine (17% vs. 48%) or  were hospitalized for exacerbations (9% vs. 37%). He will see Dr. Hull later today for consideration of thymectomy. He understands that any elective surgery is unlikely to happen in the near future (because of the COVID surge). If/when surgery is planned it will be important to reassess his MG status, to include spirometry. If he remains symptomatic then I would favor a prophylactic course of IVIG prior to surgery - but will reassess the need for that as his clinical course unfolds.      Plan:      1. Prednisone: Current dose 25 mg daily. In 10 days he will reduce to 20 mg daily x 2 weeks, then 20 and 15 mg on alternating days x 2 weeks, then 15 mg daily x 2 weeks, then 15 and 10 mg on alternating days x 2 weeks, then 10 mg daily. If any symptoms worsen then he will increase to the lowest effective dose.   2. Discussed role of steroid sparing medications. No indication to start at this time, but will reconsider pending steroid taper.   3. Mestinon: Not helpful.   4. He will see Dr. Hull later today for consideration of thymectomy. I discussed pros/cons of thymectomy with him. He understands that the benefits of thymectomy are not immediate, but that this surgery may give him the best opportunity to avoid relapse or steroid sparing immunotherapy over 2-3 years.   5. Discussed role of IVIG. No indication at this time. If he is symptomatic headed into thymectomy (if/when that surgery happens) may prophylactic treat with IVIG.   6. Spirometry: Not checked today. Will obtain if/when surgery is anticipated.   7. Potential MG triggers including heat, surgery, and illness. Certain medications and over the counter preparations may also cause worsening of MG symptoms. A list of cautionary medications can be found at: https://[email or web address].org/What-is-MG/MG-Management/[email or web address]-Drugs  8. Follow up 2 months. Sooner if needed.    ----  ADDENDUM:   12/15/20: Because of immunosuppressive treatment and given the current surge  in the pandemic, I have advised Dr. Pratt to perform patient encounters virtually and to avoid face-to-face meetings if possible. Will reassess in February, as the pandemic numbers, his treatment course, and COVID vaccine availability evolves.     Again, thank you for allowing me to participate in the care of your patient.      Sincerely,    Rj Jackson MD

## 2020-11-18 NOTE — NURSING NOTE
Chief Complaint   Patient presents with     Follow Up     [facility] RETURN MYASTHENIA GRAVIS       Lennox Coats

## 2020-11-18 NOTE — PATIENT INSTRUCTIONS
Prednisone:   In 10 days reduce to 20 mg daily x 2 weeks, then 20 and 15 mg on alternating days x 2 weeks, then 15 mg daily x 2 weeks, then 15 and 10 mg on alternating days x 2 weeks, then 10 mg daily. If any symptoms worsen then increase to the lowest effective dose.

## 2020-11-18 NOTE — LETTER
"    11/18/2020         RE: Leonidsa Pratt  [address]  [address]  [place] [address]        Dear Colleague,    Thank you for referring your patient, Leonidas Pratt, to the [facility]. Please see a copy of my visit note below.    Leonidas Pratt is a 55 year old male who is being evaluated via a billable video visit.      The patient has been notified of following:     \"This video visit will be conducted via a call between you and your physician/provider. We have found that certain health care needs can be provided without the need for an in-person physical exam.  This service lets us provide the care you need with a video conversation.  If a prescription is necessary we can send it directly to your pharmacy.  If lab work is needed we can place an order for that and you can then stop by our lab to have the test done at a later time.    Video visits are billed at different rates depending on your insurance coverage.  Please reach out to your insurance provider with any questions.    If during the course of the call the physician/provider feels a video visit is not appropriate, you will not be charged for this service.\"    Patient has given verbal consent for Video visit? Yes  How would you like to obtain your AVS? MyChart  If you are dropped from the video visit, the video invite should be resent to: Text to cell phone: [phone]  Will anyone else be joining your video visit? No       YADIRA Menon      Video-Visit Details    Type of service:  Video Visit    Video Start Time: 16:20  Video End Time: 16:33    Originating Location (pt. Location): Home    Distant Location (provider location):  [facility]     Platform used for Video Visit: [employer]              THORACIC SURGERY - NEW PATIENT OFFICE VISIT       I saw Dr. Pratt at Dr. Jackson s request in consultation for the evaluation and treatment of non-thymomatous myasthenia " gravis.    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  He had only occular symptoms for 10 years, and over the last 6 months he started developing generalized symptoms (extremity weakness, difficulty swallowing, and severe ptosis). He is currently tapering his prednisone to 25mg over the last 5 weeks. He has not required IVIG or cellcept, and has been able to taper the prednisone    Previsit Tests   CT Chest (10/29/2020)  Small soft tissue density in the anterosuperior mediastinum, presumed residual thymus    PMH    Past Medical History:   Diagnosis Date     Asthma      Kidney stone      Myasthenia gravis (H)      Strabismus     Hashimoto's    PSH    Past Surgical History:   Procedure Laterality Date     HERNIA REPAIR       LASER HOLMIUM LITHOTRIPSY URETER(S), INSERT STENT, COMBINED Right 10/15/2014    Procedure: COMBINED CYSTOSCOPY, URETEROSCOPY, LASER HOLMIUM LITHOTRIPSY URETER(S), INSERT STENT;  Surgeon: Thong Bates MD;  [facility]     RECESSION RESECTION WITH ADJUSTABLE SUTURE  9/4/2012    LLRc 5.0mm on adj. LIRc 4.5mm on adj.     RECESSION RESECTION WITH ADJUSTABLE SUTURE BILATERAL  12/18/2012    RSRc 3.0mm; adj. suture     STRABISMUS SURGERY      Hernia repair as infant     Home Medications:  Prednisone 25mg  Pyridostigmine  Trazodone  Levothyroxine    ETOH: None  Tobacco: Never    Physical examination  He appears of his stated age on video, he has no trouble talking, and his eye lids appeared normal.    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    IMPRESSION (G70.01) Myasthenia gravis with exacerbation (H)     55 year old year-old male with non-thymomatous myasthenia gravis.    PLAN  I spent a total of 20 minutes with Dr. Pratt, more than 50% of which were spent in counseling, coordination of care, and face-to-face time. I reviewed the plan as follows:  Procedure planned: subxiphoid  bilateral VATS thymectomy. We will wait till next year until we have a better understanding of the status of the pandemic. He will contact us.  All questions were answered and Leonidas Pratt and present family were in agreement with the plan.  I appreciate the opportunity to participate in the care of your patient and will keep you updated.  Sincerely,      Benjy Hull MD

## 2020-11-18 NOTE — PROGRESS NOTES
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 Onset was around 2010. First symptom was diplopia. About 3 years later ptosis started. No dysarthria, dysphagia, SOB, or weakness. 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 In 2015 he was diagnosed with MG and started on prednisone. He started prednisone 60 mg and tapered off over about 3 months. 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 He was off immunotherapy in 2016, and remained off until 12/2018. At that end of 2018 he developed diplopia and restarted prednisone 60 mg daily. He tapered over a few months. By 2/20 he was taking prednisone 5 mg daily. This time prednisone was helpful to alleviate ptosis and diplopia. He remained on low dose prednisone (around 5 mg) through most of 2019. He was stable during that time. In April 2020 he then developed worsening ptosis and diplopia of both eyes. In 4/20 he increased prednisone to 40 mg daily. By June 2020 he was back down to 5 mg. In 9/20 he then experienced new weakness in his left hand. Typing was difficult. He also felt that his legs were weaker. There was a clear drop off in his typical level of activity and exercise. No dysarthria or dysphagia. In 10/20 prednisone increased to 40 mg daily.     Interval history:   I last saw him 10/12/20. After that visit RNS was performed. There was clear decrement in both the facial-nasalis and ulnar-ADM nerves. The presence of decrement at ADM was a clear indicator that his MG was indeed generalized. He subsequently increased prednisone from 5 mg to 40 mg. Over about a week he noticed some improvements in lower limbs strength and his level of activity. He still has ptosis, but improved. He still has some diplopia at the end of the  day, but improved. Hand strength is improved from a month ago, but not back to normal. He sometimes notices difficulty with prolonged chewing. No dysarthria or dysphagia. No shortness of breath.  He has tapered prednisone slowly down to 25 mg daily. Prednisone has a major impact on his sleep - but otherwise he tolerates it reasonably well. He did take mestinon a couple times. He did not notice any clear improvements in strength or ptosis and so he did not continue it.     Prior pertinent laboratory work-up:  : Normal/negative Hba1c, TSH  4/15: ACHR ab binding 3.1 (N<0.4)    Prior electrophysiologic work-up:  12/10: RNS of the right facial nerves showed no abnormal decreament or increment.Single fiber EMG of the right frontalis muscle was subjectively well within the normal range (normal <1.69).  All individual pairs demonstrated normal jitter ranging from 13 ms to 47.2 ms with normal <53.5 ms in the frontalis.  The mean jitter of the 20 pairs in the right frontalis muscle was 22.9 ms (nl< 35.5ms). There was no transmission blocking. There is no electrophysiological evidence for neuromuscular junction disorder.  10/14/20: NCS/RNSshowed unequivocal decrement in facial-nasalis and ulnar-ADM.     Prior imagin/9/17 CT C/A/P showed no mediastinal mass  10/29/20: CT chest showed unchanged tiny soft tissue density in the anterior superior mediastinum, presumably residual thymus. No enlargement to suggest Thymoma.    Past Medical History:   Past Medical History:   Diagnosis Date     Asthma      Kidney stone      Myasthenia gravis (H)      Strabismus      Past Surgical History:  Past Surgical History:   Procedure Laterality Date     HERNIA REPAIR       LASER HOLMIUM LITHOTRIPSY URETER(S), INSERT STENT, COMBINED Right 10/15/2014    Procedure: COMBINED CYSTOSCOPY, URETEROSCOPY, LASER HOLMIUM LITHOTRIPSY URETER(S), INSERT STENT;  Surgeon: Thong Bates MD;  Location: [facility]     RECESSION RESECTION WITH  ADJUSTABLE SUTURE  9/4/2012    LLRc 5.0mm on adj. LIRc 4.5mm on adj.     RECESSION RESECTION WITH ADJUSTABLE SUTURE BILATERAL  12/18/2012    RSRc 3.0mm; adj. suture     STRABISMUS SURGERY       Family history:    There is no known family history of myopathy or other neuromuscular disorders.He does have a sister with spasmodic dysphonia.     Social History:    He denies tobacco, alcohol, or illicit drug use.     Medical Allergies:    Allergies   Allergen Reactions     Nkda [No Known Drug Allergies]      Current Medications:    Current Outpatient Medications   Medication     albuterol (PROAIR HFA/PROVENTIL HFA/VENTOLIN HFA) 108 (90 BASE) MCG/ACT Inhaler     apraclonidine (IOPIDINE) 0.5 % ophthalmic solution     levothyroxine (SYNTHROID/LEVOTHROID) 125 MCG tablet     predniSONE (DELTASONE) 10 MG tablet     predniSONE (DELTASONE) 10 MG tablet     predniSONE (DELTASONE) 5 MG tablet     pyridostigmine (MESTINON) 60 MG tablet     traZODone (DESYREL) 50 MG tablet     No current facility-administered medications for this visit.      Review of Systems: A review of systems was obtained and was negative except for what was noted above.    Physical examination:    BP (!) 134/91   Pulse 84   Resp 16   SpO2 98%      General Appearance: NAD    Skin: There are no rashes or other skin lesions.     Neurologic examination:    Mental status:  Patient is alert, attentive, and oriented x 3.  Language is coherent and fluent without aphasia.  Memory, comprehension and ability to follow commands were intact.       Cranial nerves:  No ptosis at baseline. Eye closure is mildly weak. No diplopia t mid position. With upgaze diplopia immediately emerges. After about 10 seconds of upgaze mild right > left ptosis emerges. Smile full.      Motor exam: No atrophy or fasciculations. Manual muscle testing revealed the following MRC grade muscle power:   Right Left   Neck flexion 5 5   Neck extension: 5 5   Shoulder ext rotation 5 5   Shoulder  abduction:  5 5   Elbow extension: 5 5   Elbow flexion:  5 5   Wrist flexion:  5 5   Wrist extension:  5 5   Finger flexion 5 5   Finger extension 5 5   FDI 5 5   Hip flexion 5 5   Knee flexion 5 5   Knee extension 5 5   Dorsiflexion 5 5   Plantar flexion 5 5     Complex motor skills: No tremor or ataxia     Sensory exam: Normal sensation in hands and feet    Gait: Narrow and stable     Deep tendon reflexes:   Right Left   Triceps 2 2   Biceps 2 2   Brachioradialis 2 2   Knee jerk 2 2   Ankle jerk 2 2        MG Outcomes MG-ADL QMG MG-Composite  strength (kg) MG medications   10/12/20 7 Not tested 9 Right 35, Left 31 Pred 5 mg daily   11/18/20 5 Not tested 6 Right 40, Left 39 Pred 25 mg daily     Assessment:    Leonidas Pratt is a 55 year old man with ACHR ab positive generalized non-thymomatous myasthenia gravis. Although he was historically well controlled between 2010 and 2018 on little to no immunotherapy, his clinical course over the last year - and especially last few months has been more unstable. It is reassuring that he has made improvements with higher dose prednisone, although he is not yet back to baseline. We discussed the short and long term treatment plan. For the time being our approach will be to slowly dose optimize prednisone as below. We discussed the role of thymectomy. The randomized trial published in the NEJM (2016) showed that patients who underwent thymectomy had a lower time-weighted average QMG score over a 3-year period and had a lower average requirement for prednisone. Fewer patients in the thymectomy group required immunosuppression with azathioprine (17% vs. 48%) or were hospitalized for exacerbations (9% vs. 37%). He will see Dr. Hull later today for consideration of thymectomy. He understands that any elective surgery is unlikely to happen in the near future (because of the COVID surge). If/when surgery is planned it will be important to reassess his MG status, to include  spirometry. If he remains symptomatic then I would favor a prophylactic course of IVIG prior to surgery - but will reassess the need for that as his clinical course unfolds.      Plan:      1. Prednisone: Current dose 25 mg daily. In 10 days he will reduce to 20 mg daily x 2 weeks, then 20 and 15 mg on alternating days x 2 weeks, then 15 mg daily x 2 weeks, then 15 and 10 mg on alternating days x 2 weeks, then 10 mg daily. If any symptoms worsen then he will increase to the lowest effective dose.   2. Discussed role of steroid sparing medications. No indication to start at this time, but will reconsider pending steroid taper.   3. Mestinon: Not helpful.   4. He will see Dr. Hull later today for consideration of thymectomy. I discussed pros/cons of thymectomy with him. He understands that the benefits of thymectomy are not immediate, but that this surgery may give him the best opportunity to avoid relapse or steroid sparing immunotherapy over 2-3 years.   5. Discussed role of IVIG. No indication at this time. If he is symptomatic headed into thymectomy (if/when that surgery happens) may prophylactic treat with IVIG.   6. Spirometry: Not checked today. Will obtain if/when surgery is anticipated.   7. Potential MG triggers including heat, surgery, and illness. Certain medications and over the counter preparations may also cause worsening of MG symptoms. A list of cautionary medications can be found at: [email or web address]-Drugs  8. Follow up 2 months. Sooner if needed.    ----  ADDENDUM:   12/15/20: Because of immunosuppressive treatment and given the current surge in the pandemic, I have advised Dr. Pratt to perform patient encounters virtually and to avoid face-to-face meetings if possible. Will reassess in February, as the pandemic numbers, his treatment course, and COVID vaccine availability evolves.

## 2020-12-10 DIAGNOSIS — G70.01 MYASTHENIA GRAVIS WITH EXACERBATION (H): ICD-10-CM

## 2020-12-10 RX ORDER — PREDNISONE 10 MG/1
10 TABLET ORAL DAILY
Qty: 120 TABLET | Refills: 3 | Status: SHIPPED | OUTPATIENT
Start: 2020-12-10 | End: 2021-03-17

## 2020-12-17 ENCOUNTER — TELEPHONE (OUTPATIENT)
Dept: NEUROLOGY | Facility: CLINIC | Age: 55
End: 2020-12-17

## 2021-01-12 DIAGNOSIS — E03.9 HYPOTHYROIDISM, UNSPECIFIED TYPE: ICD-10-CM

## 2021-01-14 RX ORDER — LEVOTHYROXINE SODIUM 125 UG/1
125 TABLET ORAL DAILY
Qty: 90 TABLET | Refills: 0 | Status: SHIPPED | OUTPATIENT
Start: 2021-01-14 | End: 2021-01-20 | Stop reason: DRUGHIGH

## 2021-01-14 NOTE — TELEPHONE ENCOUNTER
levothyroxine (SYNTHROID/LEVOTHROID) 125 MCG tablet  Last Written Prescription Date: 10/6/20  Last Fill Quantity: 90,   # refills:   Last Office Visit : 4/14/2017  Future Office visit:  None  09/30/19  1132     TSH 1.05         Routing refill request to provider for review/approval because:  Last appt 4/14/2017, labs 9/30/2019    Scheduling has been notified to contact the pt for appointment.

## 2021-01-15 ENCOUNTER — HEALTH MAINTENANCE LETTER (OUTPATIENT)
Age: 56
End: 2021-01-15

## 2021-01-20 ENCOUNTER — MYC MEDICAL ADVICE (OUTPATIENT)
Dept: INTERNAL MEDICINE | Facility: CLINIC | Age: 56
End: 2021-01-20

## 2021-01-20 ENCOUNTER — OFFICE VISIT (OUTPATIENT)
Dept: NEUROLOGY | Facility: CLINIC | Age: 56
End: 2021-01-20
Payer: COMMERCIAL

## 2021-01-20 VITALS
OXYGEN SATURATION: 96 % | DIASTOLIC BLOOD PRESSURE: 71 MMHG | RESPIRATION RATE: 16 BRPM | SYSTOLIC BLOOD PRESSURE: 106 MMHG | WEIGHT: 177 LBS | HEIGHT: 70 IN | BODY MASS INDEX: 25.34 KG/M2 | HEART RATE: 88 BPM

## 2021-01-20 DIAGNOSIS — G70.00 MYASTHENIA GRAVIS WITHOUT EXACERBATION (H): Primary | ICD-10-CM

## 2021-01-20 DIAGNOSIS — E03.9 HYPOTHYROIDISM, UNSPECIFIED TYPE: ICD-10-CM

## 2021-01-20 DIAGNOSIS — E03.9 HYPOTHYROIDISM, UNSPECIFIED TYPE: Primary | ICD-10-CM

## 2021-01-20 LAB
T4 FREE SERPL-MCNC: 1.22 NG/DL (ref 0.76–1.46)
TSH SERPL DL<=0.005 MIU/L-ACNC: 5.77 MU/L (ref 0.4–4)

## 2021-01-20 PROCEDURE — 84443 ASSAY THYROID STIM HORMONE: CPT | Performed by: PATHOLOGY

## 2021-01-20 PROCEDURE — 99214 OFFICE O/P EST MOD 30 MIN: CPT | Performed by: PSYCHIATRY & NEUROLOGY

## 2021-01-20 PROCEDURE — 36415 COLL VENOUS BLD VENIPUNCTURE: CPT | Performed by: PATHOLOGY

## 2021-01-20 PROCEDURE — 84439 ASSAY OF FREE THYROXINE: CPT | Performed by: PATHOLOGY

## 2021-01-20 RX ORDER — LEVOTHYROXINE SODIUM 150 UG/1
150 TABLET ORAL DAILY
Qty: 90 TABLET | Refills: 3 | Status: SHIPPED | OUTPATIENT
Start: 2021-01-20 | End: 2021-07-09

## 2021-01-20 ASSESSMENT — MIFFLIN-ST. JEOR: SCORE: 1654.12

## 2021-01-20 ASSESSMENT — PAIN SCALES - GENERAL: PAINLEVEL: NO PAIN (0)

## 2021-01-20 NOTE — LETTER
1/20/2021       RE: Leonidas Pratt  [address]  [address]  [place] [address]     Dear Colleague,    Thank you for referring your patient, Leonidas Pratt, to the [facility] [place] [facility]. Please see a copy of my visit note below.    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 Onset was around 2010. First symptom was diplopia. About 3 years later ptosis started. No dysarthria, dysphagia, SOB, or weakness. In 2015 he was found to have ACHR ab. In 2015 he was diagnosed with MG and started on prednisone. He started prednisone 60 mg and tapered off over about 3 months. He was off immunotherapy in 2016, and remained off until 12/2018. At that end of 2018 he developed diplopia and restarted prednisone 60 mg daily. He tapered over a few months. By 2/20 he was taking prednisone 5 mg daily. This time prednisone was helpful to alleviate ptosis and diplopia. He remained on low dose prednisone (around 5 mg) through most of 2019. He was stable during that time. In April 2020 he then developed worsening ptosis and diplopia of both eyes. In 4/20 he increased prednisone to 40 mg daily. By June 2020 he was back down to 5 mg. In 9/20 he then experienced new weakness in his left hand. Typing was difficult. He also felt that his legs were weaker. There was a clear drop off in his typical level of activity and exercise. No dysarthria or dysphagia. In 10/20 prednisone increased to 40 mg daily.     Interval history:   I last saw him 11/18/20. Over the last couple months he has has done well. He has tapered prednisone slowly to the current dose on 10 mg daily. He has not had any relapses or slow deteriorations. He still experiences diplopia with lateral gaze, but less severe and less often. He still sometimes notices eyelid ptosis, but not every day. Hand  strength and function has also improved. Since the prednisone has weaned his sleep has been better. In 2020 he saw Dr. Hull. Thymectomy is anticipated when it is feasible to do so.     Prior pertinent laboratory work-up:  : Normal/negative Hba1c, TSH  4/15: ACHR ab binding 3.1 (N<0.4)    Prior electrophysiologic work-up:  12/10: RNS of the right facial nerves showed no abnormal decreament or increment.Single fiber EMG of the right frontalis muscle was subjectively well within the normal range (normal <1.69).  All individual pairs demonstrated normal jitter ranging from 13 ms to 47.2 ms with normal <53.5 ms in the frontalis.  The mean jitter of the 20 pairs in the right frontalis muscle was 22.9 ms (nl< 35.5ms). There was no transmission blocking. There is no electrophysiological evidence for neuromuscular junction disorder.  10/14/20: NCS/RNSshowed unequivocal decrement in facial-nasalis and ulnar-ADM.     Prior imagin/9/17 CT C/A/P showed no mediastinal mass  10/29/20: CT chest showed unchanged tiny soft tissue density in the anterior superior mediastinum, presumably residual thymus. No enlargement to suggest Thymoma.    Past Medical History:   Past Medical History:   Diagnosis Date     Asthma      Kidney stone      Myasthenia gravis (H)      Strabismus      Past Surgical History:  Past Surgical History:   Procedure Laterality Date     HERNIA REPAIR       LASER HOLMIUM LITHOTRIPSY URETER(S), INSERT STENT, COMBINED Right 10/15/2014    Procedure: COMBINED CYSTOSCOPY, URETEROSCOPY, LASER HOLMIUM LITHOTRIPSY URETER(S), INSERT STENT;  Surgeon: Thong Bates MD;  Location: [facility] OR     RECESSION RESECTION WITH ADJUSTABLE SUTURE  2012    LLRc 5.0mm on adj. LIRc 4.5mm on adj.     RECESSION RESECTION WITH ADJUSTABLE SUTURE BILATERAL  2012    RSRc 3.0mm; adj. suture     STRABISMUS SURGERY       Family history:    There is no known family history of myopathy or other neuromuscular  "disorders.He does have a sister with spasmodic dysphonia.     Social History:    He denies tobacco, alcohol, or illicit drug use.     Medical Allergies:    Allergies   Allergen Reactions     Nkda [No Known Drug Allergies]      Current Medications:    Current Outpatient Medications   Medication     albuterol (PROAIR HFA/PROVENTIL HFA/VENTOLIN HFA) 108 (90 BASE) MCG/ACT Inhaler     levothyroxine (SYNTHROID/LEVOTHROID) 125 MCG tablet     predniSONE (DELTASONE) 10 MG tablet     predniSONE (DELTASONE) 10 MG tablet     predniSONE (DELTASONE) 5 MG tablet     pyridostigmine (MESTINON) 60 MG tablet     apraclonidine (IOPIDINE) 0.5 % ophthalmic solution     traZODone (DESYREL) 50 MG tablet     No current facility-administered medications for this visit.      Review of Systems: A review of systems was obtained and was negative except for what was noted above.    Physical examination:    /71   Pulse 88   Resp 16   Ht 1.778 m (5' 10\")   Wt 80.3 kg (177 lb)   SpO2 96%   BMI 25.40 kg/m       General Appearance: NAD    Skin: There are no rashes or other skin lesions.     Neurologic examination:    Mental status:  Patient is alert, attentive, and oriented x 3.  Language is coherent and fluent without aphasia.  Memory, comprehension and ability to follow commands were intact.       Cranial nerves:  No ptosis at baseline. No diplopia at mid position. With upgaze ptosis emerges after about 10 seconds. After about 5 seconds of lateral gaze he develops diplopia. Smile full.      Motor exam: No atrophy or fasciculations. Manual muscle testing revealed the following MRC grade muscle power:   Right Left   Neck flexion 5 5   Neck extension: 5 5   Shoulder ext rotation 5 5   Shoulder abduction:  5 5   Elbow extension: 5 5   Elbow flexion:  5 5   Wrist flexion:  5 5   Wrist extension:  5 5   Finger flexion 5 5   Finger extension 5 5   FDI 5 5   Hip flexion 5 5   Knee flexion 5 5   Knee extension 5 5   Dorsiflexion 5 5   Plantar " flexion 5 5     Complex motor skills: No tremor or ataxia     Sensory exam: Normal sensation in hands and feet    Gait: Narrow and stable     Deep tendon reflexes:   Right Left   Triceps 2 2   Biceps 2 2   Brachioradialis 2 2   Knee jerk 2 2   Ankle jerk 2 2        MG Outcomes MG-ADL QMG MG-Composite  strength (kg) MG medications   10/12/20 7 Not tested 9 Right 35, Left 31 Pred 5 mg daily   11/18/20 5 Not tested 6 Right 40, Left 39 Pred 25 mg daily   1/20/21 4 Not tested 2 Right 41, Left 41 Prednisone 10 mg daily     Assessment:    Leonidas Pratt is a 55 year old man with ACHR ab positive generalized non-thymomatous myasthenia gravis. He disease was well controlled with little to no immunotherapy between 2010 and 2018, but worsened at the end of 2018 necessitating high dose prednisone. Presently he is doing well, almost with only minimal manifestations and tolerating prednisone weaning. We will continue to find the lowest effective dose of prednisone as we are awaiting thymectomy. There is no indication for a steroid sparing medication at this time, but if his symptoms worsen and we can not keep him well controlled with low dose prednisone then that may change. Our long term goal is that thymectomy will be helpful to lessen his dependency on immunotherapy, but the beneficial effects of thymectomy are not immediate. The randomized trial published in the NEJM (2016) showed that patients who underwent thymectomy had lower QMG scores, lower average prednisone doses, less azathioprine utilization and fewer exacerbations over 3 years . Leonidas will meet with Dr. Hull next month to again discuss timing of thymectomy. If/when surgery is planned it will be important to reassess his MG status, to include spirometry. I will probably favor a prophylactic course of IVIG prior to surgery - but will reassess the need for that as his surgery date approaches.     Plan:      1. Prednisone: Current dose 10 mg daily. In 2 weeks  reduce to 10/7.5 alternating x 2 weeks, then 7.5 mg daily for 2 weeks, then 7.5 and 5 mg on alternating days x 2 weeks, then 5 mg daily.  If any symptoms worsen then he will increase to the lowest effective dose.   2. Discussed role of steroid sparing medications. No indication to start at this time, but will reconsider pending steroid taper.   3. Mestinon: Not helpful.   4. He will see Dr. Hull next month to discuss timing of thymectomy.   5. Discussed role of IVIG. No indication at this time, but if he continues to have some manifestations leading into surgery will favor a prophylactic course of 2 gm/kg IVIG (divided over 3 days). Ideally this would occur 3-10 days prior to surgery.    6. Spirometry: Not checked today. Will obtain if/when surgery is anticipated.   7. Potential MG triggers including heat, surgery, and illness. Certain medications and over the counter preparations may also cause worsening of MG symptoms. A list of cautionary medications can be found at: [email or web address]-[email or web address]-Management/[email or web address]-Drugs  8. Follow up 2 months. Sooner if needed.    ----  ADDENDUM:   Thymectomy planned for 3/22/21. Will arrange IVIG head of surgery, 2 gm/kg divided over 3/15, 3/16, and 3/17. The week prior to that would be ok as well.         Again, thank you for allowing me to participate in the care of your patient.      Sincerely,    Rj Jackson MD

## 2021-01-20 NOTE — PROGRESS NOTES
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 Onset was around 2010. First symptom was diplopia. About 3 years later ptosis started. No dysarthria, dysphagia, SOB, or weakness. In 2015 he was found to have ACHR ab. In 2015 he was diagnosed with MG and started on prednisone. He started prednisone 60 mg and tapered off over about 3 months. He was off immunotherapy in 2016, and remained off until 12/2018. At that end of 2018 he developed diplopia and restarted prednisone 60 mg daily. He tapered over a few months. By 2/20 he was taking prednisone 5 mg daily. This time prednisone was helpful to alleviate ptosis and diplopia. He remained on low dose prednisone (around 5 mg) through most of 2019. He was stable during that time. In April 2020 he then developed worsening ptosis and diplopia of both eyes. In 4/20 he increased prednisone to 40 mg daily. By June 2020 he was back down to 5 mg. In 9/20 he then experienced new weakness in his left hand. Typing was difficult. He also felt that his legs were weaker. There was a clear drop off in his typical level of activity and exercise. No dysarthria or dysphagia. In 10/20 prednisone increased to 40 mg daily.     Interval history:   I last saw him 11/18/20. Over the last couple months he has has done well. He has tapered prednisone slowly to the current dose on 10 mg daily. He has not had any relapses or slow deteriorations. He still experiences diplopia with lateral gaze, but less severe and less often. He still sometimes notices eyelid ptosis, but not every day. Hand strength and function has also improved. Since the prednisone has weaned his sleep has been better. In November of 2020 he saw Dr. Hull. Thymectomy is anticipated when it is feasible to do so.     Prior pertinent laboratory work-up:  9/19: Normal/negative Hba1c, TSH  4/15: ACHR ab binding 3.1 (N<0.4)    Prior  electrophysiologic work-up:  12/10: RNS of the right facial nerves showed no abnormal decreament or increment.Single fiber EMG of the right frontalis muscle was subjectively well within the normal range (normal <1.69).  All individual pairs demonstrated normal jitter ranging from 13 ms to 47.2 ms with normal <53.5 ms in the frontalis.  The mean jitter of the 20 pairs in the right frontalis muscle was 22.9 ms (nl< 35.5ms). There was no transmission blocking. There is no electrophysiological evidence for neuromuscular junction disorder.  10/14/20: NCS/RNSshowed unequivocal decrement in facial-nasalis and ulnar-ADM.     Prior imagin/9/17 CT C/A/P showed no mediastinal mass  10/29/20: CT chest showed unchanged tiny soft tissue density in the anterior superior mediastinum, presumably residual thymus. No enlargement to suggest Thymoma.    Past Medical History:   Past Medical History:   Diagnosis Date     Asthma      Kidney stone      Myasthenia gravis (H)      Strabismus      Past Surgical History:  Past Surgical History:   Procedure Laterality Date     HERNIA REPAIR       LASER HOLMIUM LITHOTRIPSY URETER(S), INSERT STENT, COMBINED Right 10/15/2014    Procedure: COMBINED CYSTOSCOPY, URETEROSCOPY, LASER HOLMIUM LITHOTRIPSY URETER(S), INSERT STENT;  Surgeon: Thong Bates MD;  Location: [facility]     RECESSION RESECTION WITH ADJUSTABLE SUTURE  2012    LLRc 5.0mm on adj. LIRc 4.5mm on adj.     RECESSION RESECTION WITH ADJUSTABLE SUTURE BILATERAL  2012    RSRc 3.0mm; adj. suture     STRABISMUS SURGERY       Family history:    There is no known family history of myopathy or other neuromuscular disorders.He does have a sister with spasmodic dysphonia.     Social History:    He denies tobacco, alcohol, or illicit drug use.     Medical Allergies:    Allergies   Allergen Reactions     Nkda [No Known Drug Allergies]      Current Medications:    Current Outpatient Medications   Medication     albuterol (PROAIR  "HFA/PROVENTIL HFA/VENTOLIN HFA) 108 (90 BASE) MCG/ACT Inhaler     levothyroxine (SYNTHROID/LEVOTHROID) 125 MCG tablet     predniSONE (DELTASONE) 10 MG tablet     predniSONE (DELTASONE) 10 MG tablet     predniSONE (DELTASONE) 5 MG tablet     pyridostigmine (MESTINON) 60 MG tablet     apraclonidine (IOPIDINE) 0.5 % ophthalmic solution     traZODone (DESYREL) 50 MG tablet     No current facility-administered medications for this visit.      Review of Systems: A review of systems was obtained and was negative except for what was noted above.    Physical examination:    /71   Pulse 88   Resp 16   Ht 1.778 m (5' 10\")   Wt 80.3 kg (177 lb)   SpO2 96%   BMI 25.40 kg/m       General Appearance: NAD    Skin: There are no rashes or other skin lesions.     Neurologic examination:    Mental status:  Patient is alert, attentive, and oriented x 3.  Language is coherent and fluent without aphasia.  Memory, comprehension and ability to follow commands were intact.       Cranial nerves:  No ptosis at baseline. No diplopia at mid position. With upgaze ptosis emerges after about 10 seconds. After about 5 seconds of lateral gaze he develops diplopia. Smile full.      Motor exam: No atrophy or fasciculations. Manual muscle testing revealed the following MRC grade muscle power:   Right Left   Neck flexion 5 5   Neck extension: 5 5   Shoulder ext rotation 5 5   Shoulder abduction:  5 5   Elbow extension: 5 5   Elbow flexion:  5 5   Wrist flexion:  5 5   Wrist extension:  5 5   Finger flexion 5 5   Finger extension 5 5   FDI 5 5   Hip flexion 5 5   Knee flexion 5 5   Knee extension 5 5   Dorsiflexion 5 5   Plantar flexion 5 5     Complex motor skills: No tremor or ataxia     Sensory exam: Normal sensation in hands and feet    Gait: Narrow and stable     Deep tendon reflexes:   Right Left   Triceps 2 2   Biceps 2 2   Brachioradialis 2 2   Knee jerk 2 2   Ankle jerk 2 2        MG Outcomes MG-ADL QMG MG-Composite  strength (kg) " MG medications   10/12/20 7 Not tested 9 Right 35, Left 31 Pred 5 mg daily   11/18/20 5 Not tested 6 Right 40, Left 39 Pred 25 mg daily   1/20/21 4 Not tested 2 Right 41, Left 41 Prednisone 10 mg daily     Assessment:    Leondias Pratt is a 55 year old man with ACHR ab positive generalized non-thymomatous myasthenia gravis. He disease was well controlled with little to no immunotherapy between 2010 and 2018, but worsened at the end of 2018 necessitating high dose prednisone. Presently he is doing well, almost with only minimal manifestations and tolerating prednisone weaning. We will continue to find the lowest effective dose of prednisone as we are awaiting thymectomy. There is no indication for a steroid sparing medication at this time, but if his symptoms worsen and we can not keep him well controlled with low dose prednisone then that may change. Our long term goal is that thymectomy will be helpful to lessen his dependency on immunotherapy, but the beneficial effects of thymectomy are not immediate. The randomized trial published in the NEJM (2016) showed that patients who underwent thymectomy had lower QMG scores, lower average prednisone doses, less azathioprine utilization and fewer exacerbations over 3 years . Leonidas will meet with Dr. Hull next month to again discuss timing of thymectomy. If/when surgery is planned it will be important to reassess his MG status, to include spirometry. I will probably favor a prophylactic course of IVIG prior to surgery - but will reassess the need for that as his surgery date approaches.     Plan:      1. Prednisone: Current dose 10 mg daily. In 2 weeks reduce to 10/7.5 alternating x 2 weeks, then 7.5 mg daily for 2 weeks, then 7.5 and 5 mg on alternating days x 2 weeks, then 5 mg daily.  If any symptoms worsen then he will increase to the lowest effective dose.   2. Discussed role of steroid sparing medications. No indication to start at this time, but will reconsider  pending steroid taper.   3. Mestinon: Not helpful.   4. He will see Dr. Hull next month to discuss timing of thymectomy.   5. Discussed role of IVIG. No indication at this time, but if he continues to have some manifestations leading into surgery will favor a prophylactic course of 2 gm/kg IVIG (divided over 3 days). Ideally this would occur 3-10 days prior to surgery.    6. Spirometry: Not checked today. Will obtain if/when surgery is anticipated.   7. Potential MG triggers including heat, surgery, and illness. Certain medications and over the counter preparations may also cause worsening of MG symptoms. A list of cautionary medications can be found at: https://[email or web address].org/What-is-MG/MG-Management/Cautionary-Drugs  8. Follow up 2 months. Sooner if needed.    ----  ADDENDUM:   Thymectomy planned for 3/22/21. Will arrange IVIG head of surgery, 2 gm/kg divided over 3/15, 3/16, and 3/17. The week prior to that would be ok as well.

## 2021-01-20 NOTE — NURSING NOTE
Chief Complaint   Patient presents with     RECHECK     [facility] Return Myathenia Gravis - 2 mo f/u     Thanh Rodriguez

## 2021-01-20 NOTE — TELEPHONE ENCOUNTER
----- Message from Moreno Marion MD sent at 1/20/2021  1:55 PM CST -----  He is on synthroid 125 mcg/day I think. Today labs should bump up a bit, let's go up to 150 mcg/day and redo tsh/reflex in 2 mo    If he prefers to stay on current dose and just redo labs in 2 mo to double check is fine    Ok synthroid 150 mcg one po every day 90 with 3 refills

## 2021-02-17 ENCOUNTER — VIRTUAL VISIT (OUTPATIENT)
Dept: SURGERY | Facility: CLINIC | Age: 56
End: 2021-02-17
Attending: THORACIC SURGERY (CARDIOTHORACIC VASCULAR SURGERY)
Payer: COMMERCIAL

## 2021-02-17 DIAGNOSIS — G70.01 MYASTHENIA GRAVIS WITH EXACERBATION (H): Primary | ICD-10-CM

## 2021-02-17 PROCEDURE — 999N001193 HC VIDEO/TELEPHONE VISIT; NO CHARGE

## 2021-02-17 PROCEDURE — 99214 OFFICE O/P EST MOD 30 MIN: CPT | Mod: 95 | Performed by: THORACIC SURGERY (CARDIOTHORACIC VASCULAR SURGERY)

## 2021-02-17 RX ORDER — LEVOTHYROXINE SODIUM 125 UG/1
TABLET ORAL
COMMUNITY
Start: 2021-01-13 | End: 2021-03-17

## 2021-02-17 RX ORDER — CEFAZOLIN SODIUM 2 G/50ML
2 SOLUTION INTRAVENOUS
Status: CANCELLED | OUTPATIENT
Start: 2021-02-17

## 2021-02-17 RX ORDER — CEFAZOLIN SODIUM 2 G/50ML
2 SOLUTION INTRAVENOUS SEE ADMIN INSTRUCTIONS
Status: CANCELLED | OUTPATIENT
Start: 2021-02-17

## 2021-02-17 NOTE — LETTER
"    2/17/2021         RE: Leonidas Pratt  [address]  [address]  [place] [address]        Dear Colleague,    Thank you for referring your patient, Leonidas Pratt, to the [facility]. Please see a copy of my visit note below.    Leonidas is a 56 year old who is being evaluated via a billable video visit.      How would you like to obtain your AVS? MyChart     If the video visit is dropped, the invitation should be resent by: Text to cell phone: [phone]     Will anyone else be joining your video visit? No          Vitals - Patient Reported  Weight (Patient Reported): 74.8 kg (165 lb)  Height (Patient Reported): 177.8 cm (5' 10\")  BMI (Based on Pt Reported Ht/Wt): 23.67  Pain Score: No Pain (0)    Michel Tan LPN    Video Start Time: 14:19    Video-Visit Details    Type of service:  Video Visit    Video End Time:14:36    Originating Location (pt. Location): Home    Distant Location (provider location):  [facility]     Platform used for Video Visit: [facility]      THORACIC SURGERY - ESTABLISHED PATIENT OFFICE VISIT         I saw Dr. Pratt in follow-up for the evaluation and treatment of non-thymomatous myasthenia gravis.     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  He had only occular symptoms for 10 years, and over the last 6 months he started developing generalized symptoms (extremity weakness, difficulty swallowing, and severe ptosis). He is currently on prednisone to 7.5 every day and has not required IVIG or cellcept.     Previsit Tests   CT Chest (10/29/2020)  Small soft tissue density in the anterosuperior mediastinum, presumed residual thymus    PMH     Past Medical History        Past Medical History:   Diagnosis Date     Asthma       Kidney stone       Myasthenia gravis (H)       Strabismus         Hashimoto's     PSH     Past Surgical History         Past Surgical " History:   Procedure Laterality Date     HERNIA REPAIR         LASER HOLMIUM LITHOTRIPSY URETER(S), INSERT STENT, COMBINED Right 10/15/2014     Procedure: COMBINED CYSTOSCOPY, URETEROSCOPY, LASER HOLMIUM LITHOTRIPSY URETER(S), INSERT STENT;  Surgeon: Thong Bates MD;  [facility]     RECESSION RESECTION WITH ADJUSTABLE SUTURE   9/4/2012     LLRc 5.0mm on adj. LIRc 4.5mm on adj.     RECESSION RESECTION WITH ADJUSTABLE SUTURE BILATERAL   12/18/2012     RSRc 3.0mm; adj. suture     STRABISMUS SURGERY           Hernia repair as infant      Home Medications:  Prednisone 7.5mg  Pyridostigmine  Trazodone  Levothyroxine     ETOH: None  Tobacco: Never     Physical examination  He appears of his stated age on video, he has no trouble talking, and his eye lids appeared normal.     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     IMPRESSION (G70.01) Myasthenia gravis with exacerbation (H)     55 year old year-old male with non-thymomatous myasthenia gravis.     PLAN  I spent a total of 25 minutes with Dr. Pratt, more than 50% of which were spent in counseling, coordination of care, and face-to-face time. I reviewed the plan as follows:    Procedure planned: subxiphoid bilateral VATS thymectomy, with possible sternotomy (3rd week of March).  I reviewed the indications, risks, and benefits of the procedure with Dr. Leonidas Pratt. We discussed the intraoperative risks of bleeding and injury to vital organs, potential postoperative complications including, but not limited to, major respiratory events, arrhythmia, bleeding, infection, reoperation, and death. I explained the anticipated hospital course (+/- 2 days) and postoperative recovery including pain control, chest drain management, and variable degrees of dyspnea (or need for supplemental oxygen) and fatigue that tend to get better with time.    PAC    PFT    CT chest    In-person visit with me      All  questions were answered and Leonidas Pratt and present family were in agreement with the plan.  I appreciate the opportunity to participate in the care of your patient and will keep you updated.  Sincerely,        Benjy Hull MD

## 2021-02-17 NOTE — PROGRESS NOTES
"Leonidas is a 56 year old who is being evaluated via a billable video visit.      How would you like to obtain your AVS? MyChart     If the video visit is dropped, the invitation should be resent by: Text to cell phone: [phone]     Will anyone else be joining your video visit? No          Vitals - Patient Reported  Weight (Patient Reported): 74.8 kg (165 lb)  Height (Patient Reported): 177.8 cm (5' 10\")  BMI (Based on Pt Reported Ht/Wt): 23.67  Pain Score: No Pain (0)    Michel Jurgen Cardoso LPN    Video Start Time: 14:19    Video-Visit Details    Type of service:  Video Visit    Video End Time:14:36    Originating Location (pt. Location): Home    Distant Location (provider location):  [facility]     Platform used for Video Visit: [facility]      THORACIC SURGERY - ESTABLISHED PATIENT OFFICE VISIT         I saw Dr. Pratt in follow-up for the evaluation and treatment of non-thymomatous myasthenia gravis.     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  He had only occular symptoms for 10 years, and over the last 6 months he started developing generalized symptoms (extremity weakness, difficulty swallowing, and severe ptosis). He is currently on prednisone to 7.5 every day and has not required IVIG or cellcept.     Previsit Tests   CT Chest (10/29/2020)  Small soft tissue density in the anterosuperior mediastinum, presumed residual thymus    PMH     Past Medical History        Past Medical History:   Diagnosis Date     Asthma       Kidney stone       Myasthenia gravis (H)       Strabismus         Hashimoto's     PSH     Past Surgical History         Past Surgical History:   Procedure Laterality Date     HERNIA REPAIR         LASER HOLMIUM LITHOTRIPSY URETER(S), INSERT STENT, COMBINED Right 10/15/2014     Procedure: COMBINED CYSTOSCOPY, URETEROSCOPY, LASER HOLMIUM LITHOTRIPSY URETER(S), INSERT STENT;  Surgeon: Thong Bates MD;  " [facility]: [facility]     RECESSION RESECTION WITH ADJUSTABLE SUTURE   9/4/2012     LLRc 5.0mm on adj. LIRc 4.5mm on adj.     RECESSION RESECTION WITH ADJUSTABLE SUTURE BILATERAL   12/18/2012     RSRc 3.0mm; adj. suture     STRABISMUS SURGERY           Hernia repair as infant      Home Medications:  Prednisone 7.5mg  Pyridostigmine  Trazodone  Levothyroxine     ETOH: None  Tobacco: Never     Physical examination  He appears of his stated age on video, he has no trouble talking, and his eye lids appeared normal.     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     IMPRESSION (G70.01) Myasthenia gravis with exacerbation (H)     55 year old year-old male with non-thymomatous myasthenia gravis.     PLAN  I spent a total of 25 minutes with Dr. Pratt, more than 50% of which were spent in counseling, coordination of care, and face-to-face time. I reviewed the plan as follows:    Procedure planned: subxiphoid bilateral VATS thymectomy, with possible sternotomy (3rd week of March).  I reviewed the indications, risks, and benefits of the procedure with Dr. Leonidas Pratt. We discussed the intraoperative risks of bleeding and injury to vital organs, potential postoperative complications including, but not limited to, major respiratory events, arrhythmia, bleeding, infection, reoperation, and death. I explained the anticipated hospital course (+/- 2 days) and postoperative recovery including pain control, chest drain management, and variable degrees of dyspnea (or need for supplemental oxygen) and fatigue that tend to get better with time.    PAC    PFT    CT chest    In-person visit with me      All questions were answered and Leonidas Pratt and present family were in agreement with the plan.  I appreciate the opportunity to participate in the care of your patient and will keep you updated.  Sincerely,

## 2021-02-19 ENCOUNTER — MYC MEDICAL ADVICE (OUTPATIENT)
Dept: SURGERY | Facility: CLINIC | Age: 56
End: 2021-02-19

## 2021-02-19 ENCOUNTER — TELEPHONE (OUTPATIENT)
Dept: SURGERY | Facility: CLINIC | Age: 56
End: 2021-02-19

## 2021-02-19 PROBLEM — G70.01 MYASTHENIA GRAVIS WITH EXACERBATION (H): Status: ACTIVE | Noted: 2021-02-19

## 2021-02-19 NOTE — TELEPHONE ENCOUNTER
Spoke with patient to schedule procedure with Dr. Benjy Hull    Procedure was scheduled on 03/22 at [facility] OR  Patient will have H&P with PAC 03/17    Other visits;   CT, PFT and consult scheduled on 03/17    Patient is aware a COVID-19 test is needed before their procedure. The test should be with-in 4 days of their procedure.   Test Details: Date 03/18 Location [facility]    Patient is aware a / is needed day of surgery.   Surgery letter was sent via [employer], patient has my direct contact information for any further questions.

## 2021-02-19 NOTE — TELEPHONE ENCOUNTER
FUTURE VISIT INFORMATION      SURGERY INFORMATION:    Date: 3.22.21    Location: [facility] OR     Surgeon:  Benjy Hull     Anesthesia Type:  General    Procedure: SUBXIPHOID BILATERAL THORACOSCOPIC THYMECTOMY    Consult:     RECORDS REQUESTED FROM:       Primary Care Provider: Sergio Marion

## 2021-02-22 ENCOUNTER — TELEPHONE (OUTPATIENT)
Dept: NEUROLOGY | Facility: CLINIC | Age: 56
End: 2021-02-22

## 2021-02-22 RX ORDER — HEPARIN SODIUM (PORCINE) LOCK FLUSH IV SOLN 100 UNIT/ML 100 UNIT/ML
5 SOLUTION INTRAVENOUS
Status: CANCELLED | OUTPATIENT
Start: 2021-02-22

## 2021-02-22 RX ORDER — DIPHENHYDRAMINE HCL 25 MG
50 CAPSULE ORAL ONCE
Status: CANCELLED | OUTPATIENT
Start: 2021-02-22

## 2021-02-22 RX ORDER — HEPARIN SODIUM,PORCINE 10 UNIT/ML
5 VIAL (ML) INTRAVENOUS
Status: CANCELLED | OUTPATIENT
Start: 2021-02-22

## 2021-02-22 RX ORDER — ACETAMINOPHEN 325 MG/1
650 TABLET ORAL ONCE
Status: CANCELLED
Start: 2021-02-22

## 2021-02-22 NOTE — TELEPHONE ENCOUNTER
Per Dr. Jackson, patient to receive 1 time IVIG divided dose of 2 gm/kg, divided ideally on 3/15, 3/16, and 3/17. Called patient and verified that he would be able to infuse on those days.  Patient confirmed availability.  Therapy plan routed to Dr. Jackson for signature.  Once signed, a PA will be generated in separate encounter.  Patient prefers to infuse at [facility].    Lynda Smith RN

## 2021-02-23 ENCOUNTER — TELEPHONE (OUTPATIENT)
Dept: NEUROLOGY | Facility: CLINIC | Age: 56
End: 2021-02-23

## 2021-02-23 NOTE — TELEPHONE ENCOUNTER
Prior Authorization Infusion/Clinic Administered Request    Location: [facility]  Diagnosis and ICD:Myasthenia gravis, G70.01  Drug/Therapy: IVIG 2 g/kg    Previously Tried and Failed Therapies:     Date of provider note with supporting information: 01/20/2021    Urgency (When is the patient scheduled?):     Would you like to include any research articles?        If yes please call [phone] for further instructions about sending that information

## 2021-03-04 ENCOUNTER — DOCUMENTATION ONLY (OUTPATIENT)
Dept: CARE COORDINATION | Facility: CLINIC | Age: 56
End: 2021-03-04

## 2021-03-05 DIAGNOSIS — Z11.59 ENCOUNTER FOR SCREENING FOR OTHER VIRAL DISEASES: ICD-10-CM

## 2021-03-10 ENCOUNTER — INFUSION THERAPY VISIT (OUTPATIENT)
Dept: INFUSION THERAPY | Facility: CLINIC | Age: 56
End: 2021-03-10
Attending: PSYCHIATRY & NEUROLOGY
Payer: COMMERCIAL

## 2021-03-10 VITALS
TEMPERATURE: 97.9 F | WEIGHT: 179 LBS | DIASTOLIC BLOOD PRESSURE: 75 MMHG | BODY MASS INDEX: 27.13 KG/M2 | HEART RATE: 69 BPM | OXYGEN SATURATION: 95 % | SYSTOLIC BLOOD PRESSURE: 130 MMHG | HEIGHT: 68 IN | RESPIRATION RATE: 16 BRPM

## 2021-03-10 DIAGNOSIS — G70.01 MYASTHENIA GRAVIS WITH EXACERBATION (H): Primary | ICD-10-CM

## 2021-03-10 PROCEDURE — 250N000013 HC RX MED GY IP 250 OP 250 PS 637: Performed by: PSYCHIATRY & NEUROLOGY

## 2021-03-10 PROCEDURE — 96366 THER/PROPH/DIAG IV INF ADDON: CPT

## 2021-03-10 PROCEDURE — 250N000011 HC RX IP 250 OP 636: Performed by: PSYCHIATRY & NEUROLOGY

## 2021-03-10 PROCEDURE — 96365 THER/PROPH/DIAG IV INF INIT: CPT

## 2021-03-10 PROCEDURE — 96375 TX/PRO/DX INJ NEW DRUG ADDON: CPT

## 2021-03-10 RX ORDER — DIPHENHYDRAMINE HCL 25 MG
50 CAPSULE ORAL ONCE
Status: CANCELLED | OUTPATIENT
Start: 2021-03-10

## 2021-03-10 RX ORDER — DIPHENHYDRAMINE HCL 25 MG
50 CAPSULE ORAL ONCE
Status: COMPLETED | OUTPATIENT
Start: 2021-03-10 | End: 2021-03-10

## 2021-03-10 RX ORDER — ACETAMINOPHEN 325 MG/1
650 TABLET ORAL ONCE
Status: COMPLETED | OUTPATIENT
Start: 2021-03-10 | End: 2021-03-10

## 2021-03-10 RX ORDER — HEPARIN SODIUM (PORCINE) LOCK FLUSH IV SOLN 100 UNIT/ML 100 UNIT/ML
5 SOLUTION INTRAVENOUS
Status: CANCELLED | OUTPATIENT
Start: 2021-03-10

## 2021-03-10 RX ORDER — ACETAMINOPHEN 325 MG/1
650 TABLET ORAL ONCE
Status: CANCELLED
Start: 2021-03-10

## 2021-03-10 RX ORDER — HEPARIN SODIUM,PORCINE 10 UNIT/ML
5 VIAL (ML) INTRAVENOUS
Status: CANCELLED | OUTPATIENT
Start: 2021-03-10

## 2021-03-10 RX ADMIN — ACETAMINOPHEN 650 MG: 325 TABLET ORAL at 09:20

## 2021-03-10 RX ADMIN — HYDROCORTISONE SODIUM SUCCINATE 100 MG: 100 INJECTION, POWDER, FOR SOLUTION INTRAMUSCULAR; INTRAVENOUS at 09:22

## 2021-03-10 RX ADMIN — DIPHENHYDRAMINE HYDROCHLORIDE 50 MG: 25 CAPSULE ORAL at 09:20

## 2021-03-10 RX ADMIN — HUMAN IMMUNOGLOBULIN G 45 G: 40 LIQUID INTRAVENOUS at 09:54

## 2021-03-10 ASSESSMENT — PAIN SCALES - GENERAL: PAINLEVEL: NO PAIN (0)

## 2021-03-10 NOTE — PROGRESS NOTES
Nursing Note  Leonidas Pratt presents today [facility] for:   Chief Complaint   Patient presents with     Infusion     IVIG     During [facility]'s [facility] appointment, orders from Dr Jackson were completed.  Frequency: today is dose 1 of 3 total    Progress note:  Patient identification verified by name and date of birth.  Assessment completed.  Vitals recorded in Doc Flowsheets.  Patient was provided with education regarding medication/procedure and possible side effects.  Patient verbalized understanding.     present during visit today: Not Applicable.    Treatment Conditions: ~~~ NOTE: If the patient answers yes to any of the questions below, hold the infusion and contact ordering provider or on-call provider.    1. Have you recently had an elevated temperature, fever, chills, productive cough, coughing for 3 weeks or longer or hemoptysis, abnormal vital signs, night sweats,  chest pain or have you noticed a decrease in your appetite, unexplained weight loss or fatigue? No  2. Do you have any open wounds or new incisions? No  3. Do you have any recent or upcoming hospitalizations, surgeries or dental procedures? No  4. Do you currently have or recently have had any signs of illness or infection or are you on any antibiotics? No  5. Have you had any new, sudden or worsening abdominal pain? No  6. Have you or anyone in your household received a live vaccination in the past 4 weeks? Please note:  No live vaccines while on biologic/chemotherapy until 6 months after the last treatment.  Patient can receive the flu vaccine (shot only) and the pneumovax.  It is optimal for the patient to get these vaccines mid cycle, but they can be given at any time as long as it is not on the day of the infusion. No  7. Have you recently been diagnosed with any new nervous system diseases (ie. Multiple sclerosis, Guillain [place], seizures, neurological changes) or  cancer diagnosis? No  8. Are you on any form of radiation or chemotherapy? No  9. Are you pregnant or breast feeding or do you have plans of pregnancy in the future? No  10. Have you been having any signs of worsening depression or suicidal ideations?  (benlysta only) No  11. Have there been any other new onset medical symptoms? No      Premedications: administered per order.    Drug Waste Record: No    Infusion length and rate:  infusion starts at 0.5 ml/kg/hr, then increased by 0.5 ml/kg/hr every 15 minutes to final rate of 4 ml/kg/hr.    Labs: were not ordered for this appointment.    Vascular access: peripheral IV placed today.    Is the next appt scheduled? Yes   Asymptomatic COVID test completed? Not due    Post Infusion Assessment:  Patient tolerated infusion without incident.  No evidence of extravasations.  Access discontinued per protocol.  Biologic Infusion Post Education: Call the triage nurse at your clinic or seek medical attention if you have chills and/or temperature greater than or equal to 100.5, uncontrolled nausea/vomiting, diarrhea, constipation, dizziness, shortness of breath, chest pain, heart palpitations, weakness or any other new or concerning symptoms, questions or concerns.  You cannot have any live virus vaccines prior to or during treatment or up to 6 months post infusion.  If you have an upcoming surgery, medical procedure or dental procedure during treatment, this should be discussed with your ordering physician and your surgeon/dentist.  If you are having any concerning symptom, if you are unsure if you should get your next infusion or wish to speak to a provider before your next infusion, please call your care coordinator or triage nurse at your clinic to notify them so we can adequately serve you.     Discharge Plan:   Follow up plan of care with: ongoing infusions at [facility].  Discharge instructions were reviewed with  "patient.  Patient/representative verbalized understanding of discharge instructions and all questions answered.  Patient discharged from [facility] in stable condition.    LIANNE POPE RN    Administrations This Visit     acetaminophen (TYLENOL) tablet 650 mg     Admin Date  03/10/2021 Action  Given Dose  650 mg Route  Oral Administered By  Lianne Pope RN          diphenhydrAMINE (BENADRYL) capsule 50 mg     Admin Date  03/10/2021 Action  Given Dose  50 mg Route  Oral Administered By  Lianne Pope RN          hydrocortisone sodium succinate PF (solu-CORTEF) injection 100 mg     Admin Date  03/10/2021 Action  Given Dose  100 mg Route  Intravenous Administered By  Lianne Pope RN          immune globulin PRIVIGEN - sucrose free 10 % injection 45 g     Admin Date  03/10/2021 Action  New Bag Dose  45 g Route  Intravenous Administered By  Lianne Pope RN                /86   Pulse 77   Temp 97.9  F (36.6  C) (Oral)   Ht 1.727 m (5' 8\")   Wt 81.2 kg (179 lb)   SpO2 99%   BMI 27.22 kg/m        "

## 2021-03-10 NOTE — LETTER
3/10/2021         RE: Leonidas Pratt  [address]  [address]  [place] [address]        Dear Colleague,    Thank you for referring your patient, Leonidas Pratt, to the [facility]. Please see a copy of my visit note below.    Nursing Note  Leonidas Pratt presents [facility] for:   Chief Complaint   Patient presents with     Infusion     IVIG     During [facility] appointment, orders from Dr Jackson were completed.  Frequency: today is dose 1 of 3 total    Progress note:  Patient identification verified by name and date of birth.  Assessment completed.  Vitals recorded in Doc Flowsheets.  Patient was provided with education regarding medication/procedure and possible side effects.  Patient verbalized understanding.     present during visit today: Not Applicable.    Treatment Conditions: ~~~ NOTE: If the patient answers yes to any of the questions below, hold the infusion and contact ordering provider or on-call provider.    1. Have you recently had an elevated temperature, fever, chills, productive cough, coughing for 3 weeks or longer or hemoptysis, abnormal vital signs, night sweats,  chest pain or have you noticed a decrease in your appetite, unexplained weight loss or fatigue? No  2. Do you have any open wounds or new incisions? No  3. Do you have any recent or upcoming hospitalizations, surgeries or dental procedures? No  4. Do you currently have or recently have had any signs of illness or infection or are you on any antibiotics? No  5. Have you had any new, sudden or worsening abdominal pain? No  6. Have you or anyone in your household received a live vaccination in the past 4 weeks? Please note:  No live vaccines while on biologic/chemotherapy until 6 months after the last treatment.  Patient can receive the flu vaccine (shot only) and the pneumovax.  It is optimal  for the patient to get these vaccines mid cycle, but they can be given at any time as long as it is not on the day of the infusion. No  7. Have you recently been diagnosed with any new nervous system diseases (ie. Multiple sclerosis, Guillain [place], seizures, neurological changes) or cancer diagnosis? No  8. Are you on any form of radiation or chemotherapy? No  9. Are you pregnant or breast feeding or do you have plans of pregnancy in the future? No  10. Have you been having any signs of worsening depression or suicidal ideations?  (benlysta only) No  11. Have there been any other new onset medical symptoms? No      Premedications: administered per order.    Drug Waste Record: No    Infusion length and rate:  infusion starts at 0.5 ml/kg/hr, then increased by 0.5 ml/kg/hr every 15 minutes to final rate of 4 ml/kg/hr.    Labs: were not ordered for this appointment.    Vascular access: peripheral IV placed today.    Is the next appt scheduled? Yes   Asymptomatic COVID test completed? Not due    Post Infusion Assessment:  Patient tolerated infusion without incident.  No evidence of extravasations.  Access discontinued per protocol.  Biologic Infusion Post Education: Call the triage nurse at your clinic or seek medical attention if you have chills and/or temperature greater than or equal to 100.5, uncontrolled nausea/vomiting, diarrhea, constipation, dizziness, shortness of breath, chest pain, heart palpitations, weakness or any other new or concerning symptoms, questions or concerns.  You cannot have any live virus vaccines prior to or during treatment or up to 6 months post infusion.  If you have an upcoming surgery, medical procedure or dental procedure during treatment, this should be discussed with your ordering physician and your surgeon/dentist.  If you are having any concerning symptom, if you are unsure if you should get your next infusion or wish to speak to a provider before your next infusion, please call  "your care coordinator or triage nurse at your clinic to notify them so we can adequately serve you.     Discharge Plan:   Follow up plan of care with: ongoing infusions at [facility].  Discharge instructions were reviewed with patient.  Patient/representative verbalized understanding of discharge instructions and all questions answered.  Patient discharged from [facility] in stable condition.    LIANNE POPE RN    Administrations This Visit     acetaminophen (TYLENOL) tablet 650 mg     Admin Date  03/10/2021 Action  Given Dose  650 mg Route  Oral Administered By  Lianne Pope RN          diphenhydrAMINE (BENADRYL) capsule 50 mg     Admin Date  03/10/2021 Action  Given Dose  50 mg Route  Oral Administered By  Lianne Pope RN          hydrocortisone sodium succinate PF (solu-CORTEF) injection 100 mg     Admin Date  03/10/2021 Action  Given Dose  100 mg Route  Intravenous Administered By  Lianne Pope RN          immune globulin PRIVIGEN - sucrose free 10 % injection 45 g     Admin Date  03/10/2021 Action  New Bag Dose  45 g Route  Intravenous Administered By  Lianne Pope RN                /86   Pulse 77   Temp 97.9  F (36.6  C) (Oral)   Ht 1.727 m (5' 8\")   Wt 81.2 kg (179 lb)   SpO2 99%   BMI 27.22 kg/m            Again, thank you for allowing me to participate in the care of your patient.        Sincerely,        [facility] [place]    "

## 2021-03-11 ENCOUNTER — INFUSION THERAPY VISIT (OUTPATIENT)
Dept: INFUSION THERAPY | Facility: CLINIC | Age: 56
End: 2021-03-11
Attending: PSYCHIATRY & NEUROLOGY
Payer: COMMERCIAL

## 2021-03-11 VITALS
SYSTOLIC BLOOD PRESSURE: 137 MMHG | RESPIRATION RATE: 16 BRPM | TEMPERATURE: 98.2 F | WEIGHT: 178.57 LBS | OXYGEN SATURATION: 96 % | DIASTOLIC BLOOD PRESSURE: 88 MMHG | HEART RATE: 67 BPM | BODY MASS INDEX: 27.15 KG/M2

## 2021-03-11 DIAGNOSIS — G70.01 MYASTHENIA GRAVIS WITH EXACERBATION (H): Primary | ICD-10-CM

## 2021-03-11 PROCEDURE — 96365 THER/PROPH/DIAG IV INF INIT: CPT

## 2021-03-11 PROCEDURE — 96375 TX/PRO/DX INJ NEW DRUG ADDON: CPT

## 2021-03-11 PROCEDURE — 250N000011 HC RX IP 250 OP 636: Performed by: PSYCHIATRY & NEUROLOGY

## 2021-03-11 PROCEDURE — 250N000013 HC RX MED GY IP 250 OP 250 PS 637: Performed by: PSYCHIATRY & NEUROLOGY

## 2021-03-11 PROCEDURE — 96366 THER/PROPH/DIAG IV INF ADDON: CPT

## 2021-03-11 RX ORDER — DIPHENHYDRAMINE HCL 25 MG
50 CAPSULE ORAL ONCE
Status: CANCELLED | OUTPATIENT
Start: 2021-03-11

## 2021-03-11 RX ORDER — HEPARIN SODIUM (PORCINE) LOCK FLUSH IV SOLN 100 UNIT/ML 100 UNIT/ML
5 SOLUTION INTRAVENOUS
Status: CANCELLED | OUTPATIENT
Start: 2021-03-11

## 2021-03-11 RX ORDER — ACETAMINOPHEN 325 MG/1
650 TABLET ORAL ONCE
Status: CANCELLED
Start: 2021-03-11

## 2021-03-11 RX ORDER — HEPARIN SODIUM,PORCINE 10 UNIT/ML
5 VIAL (ML) INTRAVENOUS
Status: CANCELLED | OUTPATIENT
Start: 2021-03-11

## 2021-03-11 RX ORDER — DIPHENHYDRAMINE HCL 25 MG
50 CAPSULE ORAL ONCE
Status: COMPLETED | OUTPATIENT
Start: 2021-03-11 | End: 2021-03-11

## 2021-03-11 RX ORDER — ACETAMINOPHEN 325 MG/1
650 TABLET ORAL ONCE
Status: COMPLETED | OUTPATIENT
Start: 2021-03-11 | End: 2021-03-11

## 2021-03-11 RX ADMIN — ACETAMINOPHEN 650 MG: 325 TABLET ORAL at 09:53

## 2021-03-11 RX ADMIN — HYDROCORTISONE SODIUM SUCCINATE 100 MG: 100 INJECTION, POWDER, FOR SOLUTION INTRAMUSCULAR; INTRAVENOUS at 09:52

## 2021-03-11 RX ADMIN — HUMAN IMMUNOGLOBULIN G 45 G: 40 LIQUID INTRAVENOUS at 10:22

## 2021-03-11 RX ADMIN — DIPHENHYDRAMINE HYDROCHLORIDE 50 MG: 25 CAPSULE ORAL at 09:53

## 2021-03-11 ASSESSMENT — PAIN SCALES - GENERAL: PAINLEVEL: NO PAIN (0)

## 2021-03-11 NOTE — PROGRESS NOTES
Nursing Note  Leonidas Pratt presents [facility] for:   Chief Complaint   Patient presents with     Infusion     IVIG     During [facility]'s [facility] appointment, orders from Dr. Jackson were completed.  Frequency: today is dose 2 of 3 total    Progress note:  Patient identification verified by name and date of birth.  Assessment completed.  Vitals recorded in Doc Flowsheets.  Patient was provided with education regarding medication/procedure and possible side effects.  Patient verbalized understanding.     present during visit today: Not Applicable.    Treatment Conditions: Patient denies fever, chills, signs of infection, recent illness, antibiotics use, productive cough or elevated temperature.    Premedications: administered per order.    Drug Waste Record: No    Infusion length and rate:  infusion starts at 40 ml/hr, then increased by 40 ml/hr every 15 minutes to final rate of 320 ml/hr.    Labs: were not ordered for this appointment.    Vascular access: peripheral IV placed today.    Is the next appt scheduled? Yes  Asymptomatic COVID test completed? No    Post Infusion Assessment:  Patient tolerated infusion without incident.  Site patent and intact, free from redness, edema or discomfort.  No evidence of extravasations.  Access discontinued per protocol.     Discharge Plan:   Follow up plan of care with: ongoing infusions at [facility].  Discharge instructions were reviewed with patient.  Patient/representative verbalized understanding of discharge instructions and all questions answered.  Patient discharged from [facility] in stable condition.    Administrations This Visit     acetaminophen (TYLENOL) tablet 650 mg     Admin Date  03/11/2021 Action  Given Dose  650 mg Route  Oral Administered By  Oralia Luna, RN          diphenhydrAMINE (BENADRYL) capsule 50 mg     Admin  Date  03/11/2021 Action  Given Dose  50 mg Route  Oral Administered By  Oralia Luna RN          hydrocortisone sodium succinate PF (solu-CORTEF) injection 100 mg     Admin Date  03/11/2021 Action  Given Dose  100 mg Route  Intravenous Administered By  Oralia Luna RN          immune globulin - (PRIVIGEN) sucrose free 10 % injection 45 g     Admin Date  03/11/2021 Action  New Bag Dose  45 g Route  Intravenous Administered By  Oralia Luna RN Hope Balcos, RN        /88 (BP Location: Left arm, Patient Position: Sitting, Cuff Size: Adult Regular)   Pulse 67   Temp 98.2  F (36.8  C) (Oral)   Resp 16   Wt 81 kg (178 lb 9.2 oz)   SpO2 96%   BMI 27.15 kg/m

## 2021-03-11 NOTE — LETTER
3/11/2021         RE: Leonidas Pratt  [address]  [address]  [place] [address]        Dear Colleague,    Thank you for referring your patient, Leonidas Pratt, to the [facility]. Please see a copy of my visit note below.    Nursing Note  Leonidas Pratt presents [facility] for:   Chief Complaint   Patient presents with     Infusion     IVIG     During [facility] appointment, orders from Dr. Jackson were completed.  Frequency: today is dose 2 of 3 total    Progress note:  Patient identification verified by name and date of birth.  Assessment completed.  Vitals recorded in Doc Flowsheets.  Patient was provided with education regarding medication/procedure and possible side effects.  Patient verbalized understanding.     present during visit today: Not Applicable.    Treatment Conditions: Patient denies fever, chills, signs of infection, recent illness, antibiotics use, productive cough or elevated temperature.    Premedications: administered per order.    Drug Waste Record: No    Infusion length and rate:  infusion starts at 40 ml/hr, then increased by 40 ml/hr every 15 minutes to final rate of 320 ml/hr.    Labs: were not ordered for this appointment.    Vascular access: peripheral IV placed today.    Is the next appt scheduled? Yes  Asymptomatic COVID test completed? No    Post Infusion Assessment:  Patient tolerated infusion without incident.  Site patent and intact, free from redness, edema or discomfort.  No evidence of extravasations.  Access discontinued per protocol.     Discharge Plan:   Follow up plan of care with: ongoing infusions at [facility].  Discharge instructions were reviewed with patient.  Patient/representative verbalized understanding of discharge instructions and all questions answered.  Patient discharged from [facility] in stable condition.    Administrations This Visit     acetaminophen (TYLENOL) tablet 650 mg     Admin Date  03/11/2021 Action  Given Dose  650 mg Route  Oral Administered By  Oralia Luna RN          diphenhydrAMINE (BENADRYL) capsule 50 mg     Admin Date  03/11/2021 Action  Given Dose  50 mg Route  Oral Administered By  Oralia Luna RN          hydrocortisone sodium succinate PF (solu-CORTEF) injection 100 mg     Admin Date  03/11/2021 Action  Given Dose  100 mg Route  Intravenous Administered By  Oralia Luna RN          immune globulin - (PRIVIGEN) sucrose free 10 % injection 45 g     Admin Date  03/11/2021 Action  New Bag Dose  45 g Route  Intravenous Administered By  Oralia Luna RN Hope Balcos, RN        /88 (BP Location: Left arm, Patient Position: Sitting, Cuff Size: Adult Regular)   Pulse 67   Temp 98.2  F (36.8  C) (Oral)   Resp 16   Wt 81 kg (178 lb 9.2 oz)   SpO2 96%   BMI 27.15 kg/m            Again, thank you for allowing me to participate in the care of your patient.        Sincerely,        [facility]

## 2021-03-12 ENCOUNTER — INFUSION THERAPY VISIT (OUTPATIENT)
Dept: INFUSION THERAPY | Facility: CLINIC | Age: 56
End: 2021-03-12
Attending: PSYCHIATRY & NEUROLOGY
Payer: COMMERCIAL

## 2021-03-12 VITALS
DIASTOLIC BLOOD PRESSURE: 87 MMHG | TEMPERATURE: 97.4 F | RESPIRATION RATE: 16 BRPM | OXYGEN SATURATION: 100 % | HEART RATE: 72 BPM | SYSTOLIC BLOOD PRESSURE: 124 MMHG

## 2021-03-12 DIAGNOSIS — G70.01 MYASTHENIA GRAVIS WITH EXACERBATION (H): Primary | ICD-10-CM

## 2021-03-12 PROCEDURE — 250N000013 HC RX MED GY IP 250 OP 250 PS 637: Performed by: PSYCHIATRY & NEUROLOGY

## 2021-03-12 PROCEDURE — 250N000011 HC RX IP 250 OP 636: Performed by: PSYCHIATRY & NEUROLOGY

## 2021-03-12 PROCEDURE — 96366 THER/PROPH/DIAG IV INF ADDON: CPT

## 2021-03-12 PROCEDURE — 96365 THER/PROPH/DIAG IV INF INIT: CPT

## 2021-03-12 PROCEDURE — 96375 TX/PRO/DX INJ NEW DRUG ADDON: CPT

## 2021-03-12 RX ORDER — ACETAMINOPHEN 325 MG/1
650 TABLET ORAL ONCE
Status: CANCELLED
Start: 2021-03-12

## 2021-03-12 RX ORDER — DIPHENHYDRAMINE HCL 25 MG
50 CAPSULE ORAL ONCE
Status: CANCELLED | OUTPATIENT
Start: 2021-03-12

## 2021-03-12 RX ORDER — DIPHENHYDRAMINE HCL 25 MG
50 CAPSULE ORAL ONCE
Status: COMPLETED | OUTPATIENT
Start: 2021-03-12 | End: 2021-03-12

## 2021-03-12 RX ORDER — HEPARIN SODIUM (PORCINE) LOCK FLUSH IV SOLN 100 UNIT/ML 100 UNIT/ML
5 SOLUTION INTRAVENOUS
Status: DISCONTINUED | OUTPATIENT
Start: 2021-03-12 | End: 2021-03-12

## 2021-03-12 RX ORDER — ACETAMINOPHEN 325 MG/1
650 TABLET ORAL ONCE
Status: COMPLETED | OUTPATIENT
Start: 2021-03-12 | End: 2021-03-12

## 2021-03-12 RX ORDER — HEPARIN SODIUM,PORCINE 10 UNIT/ML
5 VIAL (ML) INTRAVENOUS
Status: DISCONTINUED | OUTPATIENT
Start: 2021-03-12 | End: 2021-03-12

## 2021-03-12 RX ORDER — HEPARIN SODIUM (PORCINE) LOCK FLUSH IV SOLN 100 UNIT/ML 100 UNIT/ML
5 SOLUTION INTRAVENOUS
Status: CANCELLED | OUTPATIENT
Start: 2021-03-12

## 2021-03-12 RX ORDER — HEPARIN SODIUM,PORCINE 10 UNIT/ML
5 VIAL (ML) INTRAVENOUS
Status: CANCELLED | OUTPATIENT
Start: 2021-03-12

## 2021-03-12 RX ADMIN — DIPHENHYDRAMINE HYDROCHLORIDE 50 MG: 25 CAPSULE ORAL at 07:51

## 2021-03-12 RX ADMIN — HYDROCORTISONE SODIUM SUCCINATE 100 MG: 100 INJECTION, POWDER, FOR SOLUTION INTRAMUSCULAR; INTRAVENOUS at 07:51

## 2021-03-12 RX ADMIN — HUMAN IMMUNOGLOBULIN G 45 G: 40 LIQUID INTRAVENOUS at 08:21

## 2021-03-12 RX ADMIN — ACETAMINOPHEN 650 MG: 325 TABLET ORAL at 07:51

## 2021-03-12 NOTE — PROGRESS NOTES
Nursing Note  Leonidas Pratt presents today [facility] for:   Chief Complaint   Patient presents with     Infusion     IVIG (immune globulin)     During [facility]'s [facility] appointment, orders from Dr. Jackson were completed.  Frequency: today is dose 3 of 3 total    Progress note:  Patient identification verified by name and date of birth.  Assessment completed.  Vitals recorded in Doc Flowsheets.  Patient was provided with education regarding medication/procedure and possible side effects.  Patient verbalized understanding.     present during visit today: Not Applicable.    Treatment Conditions: Patient denies fever, chills, signs of infection, recent illness, antibiotics use, productive cough or elevated temperature.    Premedications: administered per order.  Drug Waste Record: No  Infusion length and rate:  infusion starts at 40 ml/hr, then increased by 40 ml/hr every  minutes to final rate of 320 ml/hr., 2 1/2 hours  Labs: were not ordered for this appointment.  Vascular access: peripheral IV placed today.  Is the next appt scheduled? No   Asymptomatic COVID test completed? no    Post Infusion Assessment:  Patient tolerated infusion without incident.  Site patent and intact, free from redness, edema or discomfort.  No evidence of extravasations.  Access discontinued per protocol.     Discharge Plan:   Follow up plan of care with: ongoing infusions at [facility].  Discharge instructions were reviewed with patient.  Patient/representative verbalized understanding of discharge instructions and all questions answered.  [facility] discharged [facility] in stable condition.    Tigist Abad RN    Administrations This Visit     acetaminophen (TYLENOL) tablet 650 mg     Admin Date  03/12/2021 Action  Given Dose  650 mg Route  Oral Administered By  Tigist Abad RN           diphenhydrAMINE (BENADRYL) capsule 50 mg     Admin Date  03/12/2021 Action  Given Dose  50 mg Route  Oral Administered By  Tigist Abad RN          hydrocortisone sodium succinate PF (solu-CORTEF) injection 100 mg     Admin Date  03/12/2021 Action  Given Dose  100 mg Route  Intravenous Administered By  Tigist Abad RN          immune globulin - (PRIVIGEN) sucrose free 10 % injection 45 g     Admin Date  03/12/2021 Action  New Bag Dose  45 g Route  Intravenous Administered By  Tigist Abad RN                /87   Pulse 72   Temp 97.4  F (36.3  C) (Oral)   Resp 16   SpO2 100%

## 2021-03-12 NOTE — LETTER
3/12/2021         RE: Leonidas Pratt  [address]  [address]  [place] [address]        Dear Colleague,    Thank you for referring your patient, Leonidas Pratt, to the [facility]. Please see a copy of my visit note below.    Nursing Note  Leonidas Pratt presents [facility]:   Chief Complaint   Patient presents with     Infusion     IVIG (immune globulin)     During [facility] appointment, orders from Dr. Jackson were completed.  Frequency: today is dose 3 of 3 total    Progress note:  Patient identification verified by name and date of birth.  Assessment completed.  Vitals recorded in Doc Flowsheets.  Patient was provided with education regarding medication/procedure and possible side effects.  Patient verbalized understanding.     present during visit today: Not Applicable.    Treatment Conditions: Patient denies fever, chills, signs of infection, recent illness, antibiotics use, productive cough or elevated temperature.    Premedications: administered per order.  Drug Waste Record: No  Infusion length and rate:  infusion starts at 40 ml/hr, then increased by 40 ml/hr every  minutes to final rate of 320 ml/hr., 2 1/2 hours  Labs: were not ordered for this appointment.  Vascular access: peripheral IV placed today.  Is the next appt scheduled? No   Asymptomatic COVID test completed? no    Post Infusion Assessment:  Patient tolerated infusion without incident.  Site patent and intact, free from redness, edema or discomfort.  No evidence of extravasations.  Access discontinued per protocol.     Discharge Plan:   Follow up plan of care with: ongoing infusions at [facility].  Discharge instructions were reviewed with patient.  Patient/representative verbalized understanding of discharge instructions and all questions answered.  Patient  [facility] in stable condition.    Tigist Abad RN    Administrations This Visit     acetaminophen (TYLENOL) tablet 650 mg     Admin Date  03/12/2021 Action  Given Dose  650 mg Route  Oral Administered By  Tigist Abad RN          diphenhydrAMINE (BENADRYL) capsule 50 mg     Admin Date  03/12/2021 Action  Given Dose  50 mg Route  Oral Administered By  Tigist Abad RN          hydrocortisone sodium succinate PF (solu-CORTEF) injection 100 mg     Admin Date  03/12/2021 Action  Given Dose  100 mg Route  Intravenous Administered By  Tigist Abad RN          immune globulin - (PRIVIGEN) sucrose free 10 % injection 45 g     Admin Date  03/12/2021 Action  New Bag Dose  45 g Route  Intravenous Administered By  Tigist Abad RN                /87   Pulse 72   Temp 97.4  F (36.3  C) (Oral)   Resp 16   SpO2 100%           Again, thank you for allowing me to participate in the care of your patient.        Sincerely,        [facility]

## 2021-03-16 ASSESSMENT — ENCOUNTER SYMPTOMS
EYE REDNESS: 0
EYE IRRITATION: 0
EYE WATERING: 0
DOUBLE VISION: 1
EYE PAIN: 0

## 2021-03-17 ENCOUNTER — ANCILLARY PROCEDURE (OUTPATIENT)
Dept: CT IMAGING | Facility: CLINIC | Age: 56
End: 2021-03-17
Attending: THORACIC SURGERY (CARDIOTHORACIC VASCULAR SURGERY)
Payer: COMMERCIAL

## 2021-03-17 ENCOUNTER — OFFICE VISIT (OUTPATIENT)
Dept: SURGERY | Facility: CLINIC | Age: 56
End: 2021-03-17
Attending: THORACIC SURGERY (CARDIOTHORACIC VASCULAR SURGERY)
Payer: COMMERCIAL

## 2021-03-17 ENCOUNTER — ANESTHESIA EVENT (OUTPATIENT)
Dept: SURGERY | Facility: CLINIC | Age: 56
DRG: 042 | End: 2021-03-17
Payer: COMMERCIAL

## 2021-03-17 ENCOUNTER — PRE VISIT (OUTPATIENT)
Dept: SURGERY | Facility: CLINIC | Age: 56
End: 2021-03-17

## 2021-03-17 VITALS
OXYGEN SATURATION: 96 % | HEIGHT: 68 IN | SYSTOLIC BLOOD PRESSURE: 147 MMHG | RESPIRATION RATE: 12 BRPM | WEIGHT: 181 LBS | TEMPERATURE: 97.8 F | BODY MASS INDEX: 27.43 KG/M2 | HEART RATE: 80 BPM | DIASTOLIC BLOOD PRESSURE: 79 MMHG

## 2021-03-17 DIAGNOSIS — Z01.818 PRE-OP EXAMINATION: Primary | ICD-10-CM

## 2021-03-17 DIAGNOSIS — G70.01 MYASTHENIA GRAVIS WITH EXACERBATION (H): ICD-10-CM

## 2021-03-17 DIAGNOSIS — E03.9 HYPOTHYROIDISM, UNSPECIFIED TYPE: ICD-10-CM

## 2021-03-17 DIAGNOSIS — G70.00 MYASTHENIA GRAVIS (H): Primary | ICD-10-CM

## 2021-03-17 DIAGNOSIS — G70.00 MYASTHENIA GRAVIS (H): ICD-10-CM

## 2021-03-17 LAB
ANION GAP SERPL CALCULATED.3IONS-SCNC: 5 MMOL/L (ref 3–14)
BUN SERPL-MCNC: 24 MG/DL (ref 7–30)
CALCIUM SERPL-MCNC: 9 MG/DL (ref 8.5–10.1)
CHLORIDE SERPL-SCNC: 105 MMOL/L (ref 94–109)
CO2 SERPL-SCNC: 27 MMOL/L (ref 20–32)
CREAT SERPL-MCNC: 1.01 MG/DL (ref 0.66–1.25)
ERYTHROCYTE [DISTWIDTH] IN BLOOD BY AUTOMATED COUNT: 12.4 % (ref 10–15)
GFR SERPL CREATININE-BSD FRML MDRD: 84 ML/MIN/{1.73_M2}
GLUCOSE SERPL-MCNC: 82 MG/DL (ref 70–99)
HCT VFR BLD AUTO: 43.6 % (ref 40–53)
HGB BLD-MCNC: 14.5 G/DL (ref 13.3–17.7)
MCH RBC QN AUTO: 30.7 PG (ref 26.5–33)
MCHC RBC AUTO-ENTMCNC: 33.3 G/DL (ref 31.5–36.5)
MCV RBC AUTO: 92 FL (ref 78–100)
PLATELET # BLD AUTO: 265 10E9/L (ref 150–450)
POTASSIUM SERPL-SCNC: 4.5 MMOL/L (ref 3.4–5.3)
RBC # BLD AUTO: 4.73 10E12/L (ref 4.4–5.9)
SODIUM SERPL-SCNC: 138 MMOL/L (ref 133–144)
TSH SERPL DL<=0.005 MIU/L-ACNC: 1.01 MU/L (ref 0.4–4)
WBC # BLD AUTO: 5.6 10E9/L (ref 4–11)

## 2021-03-17 PROCEDURE — 86901 BLOOD TYPING SEROLOGIC RH(D): CPT | Performed by: PATHOLOGY

## 2021-03-17 PROCEDURE — 94375 RESPIRATORY FLOW VOLUME LOOP: CPT | Performed by: INTERNAL MEDICINE

## 2021-03-17 PROCEDURE — 71260 CT THORAX DX C+: CPT | Performed by: RADIOLOGY

## 2021-03-17 PROCEDURE — 84443 ASSAY THYROID STIM HORMONE: CPT | Performed by: PATHOLOGY

## 2021-03-17 PROCEDURE — 36415 COLL VENOUS BLD VENIPUNCTURE: CPT | Performed by: PATHOLOGY

## 2021-03-17 PROCEDURE — 86850 RBC ANTIBODY SCREEN: CPT | Performed by: PATHOLOGY

## 2021-03-17 PROCEDURE — 99204 OFFICE O/P NEW MOD 45 MIN: CPT | Performed by: NURSE PRACTITIONER

## 2021-03-17 PROCEDURE — 99214 OFFICE O/P EST MOD 30 MIN: CPT | Performed by: THORACIC SURGERY (CARDIOTHORACIC VASCULAR SURGERY)

## 2021-03-17 PROCEDURE — G0463 HOSPITAL OUTPT CLINIC VISIT: HCPCS

## 2021-03-17 PROCEDURE — 80048 BASIC METABOLIC PNL TOTAL CA: CPT | Performed by: PATHOLOGY

## 2021-03-17 PROCEDURE — 94799 UNLISTED PULMONARY SVC/PX: CPT | Performed by: INTERNAL MEDICINE

## 2021-03-17 PROCEDURE — 94726 PLETHYSMOGRAPHY LUNG VOLUMES: CPT | Performed by: INTERNAL MEDICINE

## 2021-03-17 PROCEDURE — 86900 BLOOD TYPING SEROLOGIC ABO: CPT | Performed by: PATHOLOGY

## 2021-03-17 PROCEDURE — 94729 DIFFUSING CAPACITY: CPT | Performed by: INTERNAL MEDICINE

## 2021-03-17 PROCEDURE — 85027 COMPLETE CBC AUTOMATED: CPT | Performed by: PATHOLOGY

## 2021-03-17 RX ORDER — GABAPENTIN 300 MG/1
300 CAPSULE ORAL ONCE
Status: CANCELLED | OUTPATIENT
Start: 2021-03-17 | End: 2021-03-17

## 2021-03-17 RX ORDER — ACETAMINOPHEN 325 MG/1
975 TABLET ORAL ONCE
Status: CANCELLED | OUTPATIENT
Start: 2021-03-17 | End: 2021-03-17

## 2021-03-17 RX ORDER — CHLORHEXIDINE GLUCONATE ORAL RINSE 1.2 MG/ML
15 SOLUTION DENTAL ONCE
Status: CANCELLED | OUTPATIENT
Start: 2021-03-17 | End: 2021-03-17

## 2021-03-17 RX ORDER — MULTIVIT-MIN/IRON/FOLIC ACID/K 18-600-40
CAPSULE ORAL EVERY MORNING
COMMUNITY

## 2021-03-17 RX ORDER — IOPAMIDOL 755 MG/ML
88 INJECTION, SOLUTION INTRAVASCULAR ONCE
Status: COMPLETED | OUTPATIENT
Start: 2021-03-17 | End: 2021-03-17

## 2021-03-17 RX ORDER — CELECOXIB 200 MG/1
200 CAPSULE ORAL ONCE
Status: CANCELLED | OUTPATIENT
Start: 2021-03-17 | End: 2021-03-17

## 2021-03-17 RX ADMIN — IOPAMIDOL 88 ML: 755 INJECTION, SOLUTION INTRAVASCULAR at 12:13

## 2021-03-17 ASSESSMENT — PAIN SCALES - GENERAL: PAINLEVEL: NO PAIN (0)

## 2021-03-17 ASSESSMENT — MIFFLIN-ST. JEOR: SCORE: 1635.51

## 2021-03-17 NOTE — PATIENT INSTRUCTIONS
Preparing for Your Surgery      Name:  Leonidas Pratt   MRN:  [id number]   :  1965   Today's Date:  3/17/2021       Arriving for surgery:  Surgery date: 2021   Arrival time:  5:30 am    Restrictions due to COVID 19:  One consistent visitor per patient is allowed.  The visitor will be allowed in the pre-op area.  Visitors are asked to leave the building during the surgery.  No ill visitors.  All visitors must wear face mask.    [employer] parking is available for anyone with mobility limitations or disabilities.  ([place]  24 hours/ 7 days a week; [facility]  7 am- 3:30 pm, Mon- Fri)    Please come to:     [facility] [address]  [address] [place]  [address]       -    Please proceed to [address] on the 3rd floor. [phone]?     - ?If you are in need of directions, wheelchair or escort please stop at the Information Desk in the lobby.      What can I eat or drink?  -  You may eat and drink normally for up to 8 hours before your surgery. (Until 3/21/21 at 11 pm)  -  You may have clear liquids until 2 hours before surgery. (Until 5:30 am arrival time)    Examples of clear liquids:  Water  Clear broth  Juices (apple, white grape, white cranberry  and cider) without pulp  Noncarbonated, powder based beverages  (lemonade and Dong-Aid)  Sodas (Sprite, 7-Up, ginger ale and seltzer)  Coffee or tea (without milk or cream)  Gatorade    -  No Alcohol for at least 24 hours before surgery     Which medicines can I take?    Hold Aspirin for 7 days before surgery.   Hold Multivitamins for 7 days before surgery.  Hold Supplements for 7 days before surgery.  Hold Ibuprofen (Advil, Motrin) for 1 day before surgery--unless otherwise directed by surgeon.  Hold Naproxen (Aleve) for 4 days before surgery.    -  DO NOT take these medications the day of surgery:]  Vitamin D      -  PLEASE TAKE these medications the day of surgery:  Inhaler as usual and bring  to hospital   Levothyroxine   Prednisone       How do I prepare myself?  - Please take 2 showers before surgery using Scrubcare or Hibiclens soap.    Use this soap only from the neck to your toes.     Leave the soap on your skin for one minute--then rinse thoroughly.      You may use your own shampoo and conditioner; no other hair products.   - Please remove all jewelry and body piercings.  - No lotions, deodorants or fragrance.  - No makeup or fingernail polish.   - Bring your ID and insurance card.    - All patients are required to have a Covid-19 test within 4 days of surgery/procedure.      -Patients will be contacted by the [facility] scheduling team within 1 week of surgery to make an appointment.      - Patients may call the Scheduling team at [phone] if they have not been scheduled within 4 days of  surgery.        Questions or Concerns:    - For any questions regarding the day of surgery or your hospital stay, please contact the Pre Admission Nursing Office at [phone].       - If you have health changes between today and your surgery please call your surgeon.       For questions after surgery please call your surgeons office.

## 2021-03-17 NOTE — NURSING NOTE
"Oncology Rooming Note    March 17, 2021 4:04 PM   Leonidas Pratt is a 56 year old male who presents for:    Chief Complaint   Patient presents with     RECHECK     Myasthenia gravis with exacerbation (H)      Initial Vitals: There were no vitals taken for this visit. Estimated body mass index is 27.52 kg/m  as calculated from the following:    Height as of an earlier encounter on 3/17/21: 1.727 m (5' 8\").    Weight as of an earlier encounter on 3/17/21: 82.1 kg (181 lb). There is no height or weight on file to calculate BSA.  Data Unavailable Comment: Data Unavailable   No LMP for male patient.  Allergies reviewed: Yes  Medications reviewed: Yes    Medications: Medication refills not needed today.  Pharmacy name entered into EPIC:    [facility] [place], [place] - [address] [facility] [phone]  [facility] [place], [place] - [address]  [facility] [place], [place] - [address]  [id number] - [place], [place] - [address] AT [facility] & [address] [place] [address] DRUG STORE #[id number] - [place], [place] - [address] AT [facility]    Clinical concerns: NO NEW NEEDS    Stacia Francisco CMA              "

## 2021-03-17 NOTE — ANESTHESIA PREPROCEDURE EVALUATION
Anesthesia Pre-Procedure Evaluation    Patient: Leonidas Pratt   MRN: [id number] : 1965        Preoperative Diagnosis: * No surgery found *   Procedure :      Past Medical History:   Diagnosis Date     Asthma      Kidney stone      Myasthenia gravis (H)      Strabismus       Past Surgical History:   Procedure Laterality Date     HERNIA REPAIR       LASER HOLMIUM LITHOTRIPSY URETER(S), INSERT STENT, COMBINED Right 10/15/2014    Procedure: COMBINED CYSTOSCOPY, URETEROSCOPY, LASER HOLMIUM LITHOTRIPSY URETER(S), INSERT STENT;  Surgeon: Thong Bates MD;  Location: [facility] OR     RECESSION RESECTION WITH ADJUSTABLE SUTURE  2012    LLRc 5.0mm on adj. LIRc 4.5mm on adj.     RECESSION RESECTION WITH ADJUSTABLE SUTURE BILATERAL  2012    RSRc 3.0mm; adj. suture     STRABISMUS SURGERY        Allergies   Allergen Reactions     Nkda [No Known Drug Allergies]       Social History     Tobacco Use     Smoking status: Never Smoker     Smokeless tobacco: Never Used   Substance Use Topics     Alcohol use: No      Wt Readings from Last 1 Encounters:   21 82.1 kg (181 lb)        Anesthesia Evaluation   Pt has had prior anesthetic. Type: General.    No history of anesthetic complications       ROS/MED HX  ENT/Pulmonary:     (+) Intermittent, asthma (Childhood asthma. Symptoms only with exercise.  No maintenance inhaler.  Well controlled without recent exacerbation. ) Treatment: Inhaler prn,      Neurologic: Comment: Myasthenia gravis  - neg neurologic ROS     Cardiovascular:  - neg cardiovascular ROS     METS/Exercise Tolerance: >4 METS Comment: Enjoys lifting working out.    Hematologic:  - neg hematologic  ROS     Musculoskeletal:  - neg musculoskeletal ROS     GI/Hepatic: Comment: Hernia repair as a child.       Renal/Genitourinary:     (+) renal disease, Nephrolithiasis  (s/p lithotrypsy ()),     Endo:     (+) thyroid problem, hypothyroidism, Chronic steroid usage for Other. Date most recently used:  daily.     Psychiatric/Substance Use:  - neg psychiatric ROS     Infectious Disease:  - neg infectious disease ROS     Malignancy:  - neg malignancy ROS     Other:  - neg other ROS          Physical Exam    Airway  airway exam normal      Mallampati: II   TM distance: > 3 FB   Neck ROM: full   Mouth opening: > 3 cm    Respiratory Devices and Support         Dental  no notable dental history         Cardiovascular   cardiovascular exam normal       Rhythm and rate: regular and normal     Pulmonary   pulmonary exam normal        breath sounds clear to auscultation           OUTSIDE LABS:  CBC:   Lab Results   Component Value Date    WBC 7.2 06/27/2017    WBC 5.4 06/09/2017    HGB 15.2 06/27/2017    HGB 13.0 (L) 06/09/2017    HCT 44.7 06/27/2017    HCT 37.8 (L) 06/09/2017     06/27/2017     06/09/2017     BMP:   Lab Results   Component Value Date     06/09/2017     08/21/2014    POTASSIUM 3.9 06/09/2017    POTASSIUM 3.9 08/21/2014    CHLORIDE 106 06/09/2017    CHLORIDE 106 08/21/2014    CO2 26 06/09/2017    CO2 28 08/21/2014    BUN 17 06/09/2017    BUN 15 08/21/2014    CR 1.07 06/09/2017    CR 1.18 08/21/2014    GLC 84 06/09/2017     (H) 08/21/2014     COAGS: No results found for: PTT, INR, FIBR  POC: No results found for: BGM, HCG, HCGS  HEPATIC:   Lab Results   Component Value Date    ALBUMIN 4.4 06/27/2017    PROTTOTAL 8.1 06/27/2017    ALT 36 06/27/2017    AST 24 06/27/2017    ALKPHOS 100 06/27/2017    BILITOTAL 0.6 06/27/2017     OTHER:   Lab Results   Component Value Date    A1C 5.2 10/14/2020    YOAN 7.8 (L) 06/09/2017    PHOS 3.3 04/27/2011    TSH 5.77 (H) 01/20/2021    T4 1.22 01/20/2021    CRP <5.0 02/22/2011    SED 7 04/27/2011       Anesthesia Plan    ASA Status:  2   NPO Status:  NPO Appropriate    Anesthesia Type: General.     - Airway: ETT   Induction: Intravenous, Propofol, RSI.   Maintenance: Balanced.   Techniques and Equipment:     - Lines/Monitors: 2nd IV, Arterial  Line (bilat lower extremity IVs at surgeon request)     Consents    Anesthesia Plan(s) and associated risks, benefits, and realistic alternatives discussed. Questions answered and patient/representative(s) expressed understanding.     - Discussed with:  Patient         Postoperative Care    Pain management: IV analgesics, Oral pain medications.   PONV prophylaxis: Ondansetron (or other 5HT-3) (hydrocortisone)     Comments:    ______________________________________________________________________  I discussed the risks and benefits of general anesthesia, [place] with the patient.  Questions were sought and answered.      Griffin Espinal MD  Attending Anesthesiologist            PAC Discussion and Assessment    ASA Classification: 3  Case is suitable for: [place]  Anesthetic techniques and relevant risks discussed: GA                  PAC Resident/NP Anesthesia Assessment: Leonidas Pratt is a 56-year-old male scheduled for SUBXIPHOID BILATERAL THORACOSCOPIC THYMECTOMY with Benjy Hull MD on 3/22/2021 at [facility] under general anesthesia.     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  He has a 10-year h/o myasthenia gravis with mainly ptosis and diplopia symptoms.  This has been managed with intermittent steroids since 2015.  Over the last six months, he has developed more generalized symptoms such as extremity weakness. Additionally, he is followed by Dr. Luis E Jackson in neurology with last visit on 1/20/2021.  Dr. Pratt has received three doses of IVIG (3/10-3/12/21) in preparation for the above surgery.     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  He denies any cardiopulmonary history or symptoms. He received anesthesia when he was an infant and does not know of any adverse effects.  He denies any family history of adverse effects from anesthesia.  He would like to proceed with above surgical  intervention.     PAC referral for risk assessment and optimization of anesthesia with comorbid conditions including intermittent asthma, h/o kidney stone, myasthenia gravis, chronic steroid use and hypothyroidism.     He has the following specific operative considerations:   - SAMANTHA # of risks 2/8 = low (age and male)  - VTE risk:  0.5%  - Risk of PONV score = 1-2.  If > 2, anti-emetic intervention recommended.      #  Cardiology  -Denies known coronary artery disease.  Denies cardiac symptoms. METS:  >4. RCRI : No serious cardiac risks.  0.4 % risk of major adverse cardiac event.       - No ASA therapy    #  Pulmonary   - no smoking  - Intermittent asthma well controlled only using rescue inhaler (albuterol) for exercise induced symptoms.  No exacerbations and no maintenance inhaler. LS clear on exam.     #  Renal     - h/o kidney stone without recurrence      #  Endocrine   - Hypothyroidism, take Levothyroxine DOS.    #  Neuro   - Myasthenia gravis with exacerbation with above procedure planned with ORAS protocol.  Followed by Dr. Luis E Jackson.  Received 3 doses IVIG (3//21). Currently on Prednisoe 5 mg daily.  Consider stress dose steroids DOS. CBC, BMP and T& S DOS.       #  ID  - COVID-19 testing per surgeon's office  - Completed COVID vaccination on 1/25/2021    #   Anesthesia considerations   -  Refer to PAC assessment in anesthesia records      Arrival time, NPO, shower and medication instructions provided by nursing staff today.  Preparing For Your Surgery handout given.  Patient was discussed with Dr Gilbert.           Diagnosis     Myasthenia gravis with exacerbation (H)        Reviewed and Signed by PAC Mid-Level Provider/Resident  Mid-Level Provider/Resident: Annette Khan APRMIYA CNP  Date: 3/17/2021      Attending Anesthesiologist Anesthesia Assessment: Agree    Anesthesiologist: Of[place]  Date: 3/17/21                     Annette Khan, MARY CNP

## 2021-03-17 NOTE — H&P
Pre-Operative H & P     CC:  Preoperative exam to assess for increased cardiopulmonary risk while undergoing surgery and anesthesia.    Date of Encounter: 3/17/2021  Primary Care Physician:  Moreno Marion  Leonidas Pratt is a 56 year old male who presents for pre-operative H & P in preparation for SUBXIPHOID BILATERAL THORACOSCOPIC THYMECTOMY with Benjy Hull MD on 3/22/2021 at [facility] under general anesthesia.     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  He has a 10-year h/o myasthenia gravis with mainly ptosis and diplopia symptoms.  This has been managed with intermittent steroids since 2015.  Over the last six months, he has developed more generalized symptoms such as extremity weakness. Additionally, he is followed by Dr. Luis E Jackson in neurology with last visit on 1/20/2021.   Terence has received three doses of IVIG (3/10-3/12/21) in preparation for the above surgery.     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  He denies any cardiopulmonary history or symptoms. He received anesthesia when he was an infant and does not know of any adverse effects.  He denies any family history of adverse effects from anesthesia.  He would like to proceed with above surgical intervention.     PAC referral for risk assessment and optimization of anesthesia with comorbid conditions including intermittent asthma, h/o kidney stone, myasthenia gravis, chronic steroid use and hypothyroidism.     Diagnosis     Myasthenia gravis with exacerbation (H)        History is obtained from the patient and electronic health record.     Past Medical History  Past Medical History:   Diagnosis Date     Asthma      Kidney stone      Myasthenia gravis (H)      Strabismus        Past Surgical History  Past Surgical History:   Procedure Laterality Date     HERNIA REPAIR       LASER HOLMIUM LITHOTRIPSY URETER(S),  INSERT STENT, COMBINED Right 10/15/2014    Procedure: COMBINED CYSTOSCOPY, URETEROSCOPY, LASER HOLMIUM LITHOTRIPSY URETER(S), INSERT STENT;  Surgeon: Thong Bates MD;  [facility] OR     RECESSION RESECTION WITH ADJUSTABLE SUTURE  9/4/2012    LLRc 5.0mm on adj. LIRc 4.5mm on adj.     RECESSION RESECTION WITH ADJUSTABLE SUTURE BILATERAL  12/18/2012    RSRc 3.0mm; adj. suture     STRABISMUS SURGERY         Hx of Blood transfusions/reactions: denies     Hx of abnormal bleeding or anti-platelet use: denies    Menstrual history: No LMP for male patient.: d    Steroid use in the last year:  Prednisone daily     Personal or FH with difficulty with Anesthesia:  Denies     Prior to Admission Medications  Current Outpatient Medications   Medication Sig Dispense Refill     albuterol (PROAIR HFA/PROVENTIL HFA/VENTOLIN HFA) 108 (90 BASE) MCG/ACT Inhaler Inhale 2 puffs into the lungs every 6 hours as needed for shortness of breath / dyspnea or wheezing 1 Inhaler 0     levothyroxine (SYNTHROID/LEVOTHROID) 150 MCG tablet Take 1 tablet (150 mcg) by mouth daily (Patient taking differently: Take 150 mcg by mouth every morning ) 90 tablet 3     predniSONE (DELTASONE) 5 MG tablet Take 4 tablets (20 mg) by mouth daily (Patient taking differently: Take 20 mg by mouth every morning ) 120 tablet 11     Vitamin D, Cholecalciferol, 25 MCG (1000 UT) TABS Take by mouth every morning         Allergies  Allergies   Allergen Reactions     Nkda [No Known Drug Allergies]        Social History  Social History     Socioeconomic History     Marital status: Single     Spouse name: Not on file     Number of children: Not on file     Years of education: Not on file     Highest education level: Not on file   Occupational History     Not on file   Social Needs     Financial resource strain: Not on file     Food insecurity     Worry: Not on file     Inability: Not on file     Transportation needs     Medical: Not on file     Non-medical: Not on  file   Tobacco Use     Smoking status: Never Smoker     Smokeless tobacco: Never Used   Substance and Sexual Activity     Alcohol use: No     Drug use: No     Sexual activity: Not on file   Lifestyle     Physical activity     Days per week: Not on file     Minutes per session: Not on file     Stress: Not on file   Relationships     Social connections     Talks on phone: Not on file     Gets together: Not on file     Attends Quaker service: Not on file     Active member of club or organization: Not on file     Attends meetings of clubs or organizations: Not on file     Relationship status: Not on file     Intimate partner violence     Fear of current or ex partner: Not on file     Emotionally abused: Not on file     Physically abused: Not on file     Forced sexual activity: Not on file   Other Topics Concern     Not on file   Social History Narrative     Not on file       Family History  Family History   Problem Relation Age of Onset     Thyroid Disease Sister        ROS/MED HX  ENT/Pulmonary:     (+) Intermittent, asthma (Childhood asthma. Symptoms only with exercise.  No maintenance inhaler.  Well controlled without recent exacerbation. ) Treatment: Inhaler prn,      Neurologic: Comment: Myasthenia gravis  - neg neurologic ROS     Cardiovascular:  - neg cardiovascular ROS     METS/Exercise Tolerance: >4 METS Comment: Enjoys lifting working out.    Hematologic:  - neg hematologic  ROS     Musculoskeletal:  - neg musculoskeletal ROS     GI/Hepatic: Comment: Hernia repair as a child.       Renal/Genitourinary:     (+) renal disease, Nephrolithiasis  (s/p lithotrypsy (2014)),     Endo:     (+) thyroid problem, hypothyroidism, Chronic steroid usage for Other. Date most recently used: daily.     Psychiatric/Substance Use:  - neg psychiatric ROS     Infectious Disease:  - neg infectious disease ROS     Malignancy:  - neg malignancy ROS     Other:  - neg other ROS           Temp: 97.8  F (36.6  C) Temp src: Oral BP: (!)  "147/79 Pulse: 80   Resp: 12 SpO2: 96 %         181 lbs 0 oz  5' 8\"[pt reported[   Body mass index is 27.52 kg/m .       Physical Exam  Constitutional: Awake, alert, cooperative, no apparent distress, and appears stated age.  Eyes: Pupils equal, round and reactive to light, extra ocular muscles intact, sclera clear, conjunctiva normal.  HENT: Normocephalic, oral pharynx with moist mucus membranes, good dentition. No goiter appreciated.   Respiratory: Clear to auscultation bilaterally, no crackles or wheezing.  Cardiovascular: Regular rate and rhythm, normal S1 and S2, and no murmur noted.  Carotids +2, no bruits. No edema. Palpable pulses to radial  DP and PT arteries.   GI: Normal bowel sounds, soft, non-distended, non-tender, no masses palpated, no hepatosplenomegaly.    Lymph/Hematologic: No cervical lymphadenopathy and no supraclavicular lymphadenopathy.  Genitourinary:  deferred  Skin: Warm and dry.  No rashes at anticipated surgical site.   Musculoskeletal: Full ROM of neck. There is no redness, warmth, or swelling of the joints. Gross motor strength is normal.    Neurologic: Awake, alert, oriented to name, place and time. Cranial nerves II-XII are grossly intact. Gait is normal.   Neuropsychiatric: Calm, cooperative. Normal affect.     Labs: (personally reviewed)  Lab Results   Component Value Date    WBC 5.6 03/17/2021     Lab Results   Component Value Date    RBC 4.73 03/17/2021     Lab Results   Component Value Date    HGB 14.5 03/17/2021     Lab Results   Component Value Date    HCT 43.6 03/17/2021     Lab Results   Component Value Date    MCV 92 03/17/2021     Lab Results   Component Value Date    MCH 30.7 03/17/2021     Lab Results   Component Value Date    MCHC 33.3 03/17/2021     Lab Results   Component Value Date    RDW 12.4 03/17/2021     Lab Results   Component Value Date     03/17/2021     Last Comprehensive Metabolic Panel:  Sodium   Date Value Ref Range Status   03/17/2021 138 133 - 144 " mmol/L Final     Potassium   Date Value Ref Range Status   03/17/2021 4.5 3.4 - 5.3 mmol/L Final     Chloride   Date Value Ref Range Status   03/17/2021 105 94 - 109 mmol/L Final     Carbon Dioxide   Date Value Ref Range Status   03/17/2021 27 20 - 32 mmol/L Final     Anion Gap   Date Value Ref Range Status   03/17/2021 5 3 - 14 mmol/L Final     Glucose   Date Value Ref Range Status   03/17/2021 82 70 - 99 mg/dL Final     Urea Nitrogen   Date Value Ref Range Status   03/17/2021 24 7 - 30 mg/dL Final     Creatinine   Date Value Ref Range Status   03/17/2021 1.01 0.66 - 1.25 mg/dL Final     GFR Estimate   Date Value Ref Range Status   03/17/2021 84 >60 mL/min/[1.73_m2] Final     Comment:     Non  GFR Calc  Starting 12/18/2018, serum creatinine based estimated GFR (eGFR) will be   calculated using the Chronic Kidney Disease Epidemiology Collaboration   (CKD-EPI) equation.       Calcium   Date Value Ref Range Status   03/17/2021 9.0 8.5 - 10.1 mg/dL Final         ASSESSMENT and PLAN  Leonidas Pratt is a 56 year old male scheduled to undergo SUBXIPHOID BILATERAL THORACOSCOPIC THYMECTOMY with Benjy Hull MD on 3/22/2021 at [facility] under general anesthesia.     He has the following specific operative considerations:   - SAMANTHA # of risks 2/8 = low (age and male)  - VTE risk:  0.5%  - Risk of PONV score = 1-2.  If > 2, anti-emetic intervention recommended.    #  Cardiology  -Denies known coronary artery disease.  Denies cardiac symptoms. METS:  >4. RCRI : No serious cardiac risks.  0.4 % risk of major adverse cardiac event.       - No ASA therapy    #  Pulmonary   - no smoking  - Intermittent asthma well controlled only using rescue inhaler (albuterol) for exercise induced symptoms.  No exacerbations and no maintenance inhaler. LS clear on exam.     #  Renal     - h/o kidney stone without recurrence      #  Endocrine   - Hypothyroidism, take Levothyroxine DOS.    #  Neuro   - Myasthenia gravis with  exacerbation with above procedure planned with ORAS protocol.  Followed by Dr. Luis E Jackson.  Received 3 doses IVIG (3//21). Currently on Prednisoe 5 mg daily.  Consider stress dose steroids DOS. CBC, BMP and T& S DOS.     #  ID  - COVID-19 testing per surgeon's office  - Completed COVID vaccination on 1/25/2021    #   Anesthesia considerations   -  Refer to PAC assessment in anesthesia records      Arrival time, NPO, shower and medication instructions provided by nursing staff today.  Preparing For Your Surgery handout given.  Patient was discussed with Dr Gilbert.    MARY Kovacs CNP  Preoperative Assessment Center  [facility]  Phone: [phone]  Fax: [phone]

## 2021-03-17 NOTE — LETTER
3/17/2021         RE: Leonidas Pratt  [address]  [address]  [place] [address]        Dear Colleague,    Thank you for referring your patient, Leonidas Pratt, to the [facility]. Please see a copy of my visit note below.    THORACIC SURGERY - ESTABLISHED PATIENT OFFICE VISIT         I saw Dr. Pratt in follow-up for the evaluation and treatment of non-thymomatous myasthenia gravis.     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  He had only occular symptoms for 10 years, and over the last 6 months he started developing generalized symptoms (extremity weakness, difficulty swallowing, and severe ptosis). He is currently on prednisone to 7.5 every day and has not required IVIG or cellcept.     Previsit Tests   CT Chest (10/29/2020)  Small soft tissue density in the anterosuperior mediastinum, presumed residual thymus    PMH     Past Medical History           Past Medical History:   Diagnosis Date     Asthma       Kidney stone       Myasthenia gravis (H)       Strabismus         Hashimoto's     PSH     Past Surgical History             Past Surgical History:   Procedure Laterality Date     HERNIA REPAIR         LASER HOLMIUM LITHOTRIPSY URETER(S), INSERT STENT, COMBINED Right 10/15/2014     Procedure: COMBINED CYSTOSCOPY, URETEROSCOPY, LASER HOLMIUM LITHOTRIPSY URETER(S), INSERT STENT;  Surgeon: Thong Bates MD;  Location: [facility] OR     RECESSION RESECTION WITH ADJUSTABLE SUTURE   9/4/2012     LLRc 5.0mm on adj. LIRc 4.5mm on adj.     RECESSION RESECTION WITH ADJUSTABLE SUTURE BILATERAL   12/18/2012     RSRc 3.0mm; adj. suture     STRABISMUS SURGERY           Hernia repair as infant      Home Medications:  Prednisone 7.5mg  Pyridostigmine  Trazodone  Levothyroxine     ETOH: None  Tobacco: Never  BMI 27     Physical examination  No eye droop, otherwise he appears quite healthy.    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     IMPRESSION (G70.01) Myasthenia gravis with exacerbation (H)     55 year old year-old male with non-thymomatous myasthenia gravis.     PLAN  I spent a total of 25 minutes with Dr. Pratt, more than 50% of which were spent in counseling, coordination of care, and face-to-face time. I reviewed the plan as follows:     Procedure planned: subxiphoid bilateral VATS thymectomy with possible sternotomy (3rd week of March).  I reviewed the indications, risks, and benefits of the procedure with Dr. Leonidas Pratt. We discussed the intraoperative risks of bleeding and injury to vital organs, potential postoperative complications including, but not limited to, major respiratory events, arrhythmia, bleeding, infection, reoperation, and death. I explained the anticipated hospital course (+/- 2 days) and postoperative recovery including pain control, chest drain management, and variable degrees of dyspnea (or need for supplemental oxygen) and fatigue that tend to get better with time.             All questions were answered and Leonidas Pratt and present family were in agreement with the plan.  I appreciate the opportunity to participate in the care of your patient and will keep you updated.  Sincerely,          Benjy Hull MD

## 2021-03-18 DIAGNOSIS — Z11.59 ENCOUNTER FOR SCREENING FOR OTHER VIRAL DISEASES: ICD-10-CM

## 2021-03-18 LAB
DLCOUNC-%PRED-PRE: 104 %
DLCOUNC-PRE: 29.85 ML/MIN/MMHG
DLCOUNC-PRED: 28.68 ML/MIN/MMHG
ERV-%PRED-PRE: 69 %
ERV-PRE: 0.96 L
ERV-PRED: 1.38 L
EXPTIME-PRE: 7.75 SEC
FEF2575-%PRED-PRE: 91 %
FEF2575-PRE: 3.07 L/SEC
FEF2575-PRED: 3.35 L/SEC
FEFMAX-%PRED-PRE: 102 %
FEFMAX-PRE: 9.88 L/SEC
FEFMAX-PRED: 9.62 L/SEC
FEV1-%PRED-PRE: 93 %
FEV1-PRE: 3.57 L
FEV1FEV6-PRE: 79 %
FEV1FEV6-PRED: 80 %
FEV1FVC-PRE: 78 %
FEV1FVC-PRED: 78 %
FEV1SVC-PRE: 77 %
FEV1SVC-PRED: 74 %
FIFMAX-PRE: 7.71 L/SEC
FRCPLETH-%PRED-PRE: 90 %
FRCPLETH-PRE: 3.23 L
FRCPLETH-PRED: 3.56 L
FVC-%PRED-PRE: 93 %
FVC-PRE: 4.57 L
FVC-PRED: 4.86 L
IC-%PRED-PRE: 97 %
IC-PRE: 3.67 L
IC-PRED: 3.75 L
LABORATORY COMMENT REPORT: NORMAL
MEP-PRE: 127 CMH2O
MIP-PRE: -111 CMH2O
RVPLETH-%PRED-PRE: 98 %
RVPLETH-PRE: 2.26 L
RVPLETH-PRED: 2.29 L
SARS-COV-2 RNA RESP QL NAA+PROBE: NEGATIVE
SARS-COV-2 RNA RESP QL NAA+PROBE: NORMAL
SPECIMEN SOURCE: NORMAL
SPECIMEN SOURCE: NORMAL
TLCPLETH-%PRED-PRE: 96 %
TLCPLETH-PRE: 6.89 L
TLCPLETH-PRED: 7.13 L
VA-%PRED-PRE: 90 %
VA-PRE: 5.94 L
VC-%PRED-PRE: 90 %
VC-PRE: 4.63 L
VC-PRED: 5.13 L

## 2021-03-18 PROCEDURE — U0005 INFEC AGEN DETEC AMPLI PROBE: HCPCS | Mod: 90 | Performed by: PATHOLOGY

## 2021-03-18 PROCEDURE — U0003 INFECTIOUS AGENT DETECTION BY NUCLEIC ACID (DNA OR RNA); SEVERE ACUTE RESPIRATORY SYNDROME CORONAVIRUS 2 (SARS-COV-2) (CORONAVIRUS DISEASE [COVID-19]), AMPLIFIED PROBE TECHNIQUE, MAKING USE OF HIGH THROUGHPUT TECHNOLOGIES AS DESCRIBED BY CMS-2020-01-R: HCPCS | Mod: 90 | Performed by: PATHOLOGY

## 2021-03-18 NOTE — PROGRESS NOTES
THORACIC SURGERY - ESTABLISHED PATIENT OFFICE VISIT         I saw Dr. Pratt in follow-up for the evaluation and treatment of non-thymomatous myasthenia gravis.     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  He had only occular symptoms for 10 years, and over the last 6 months he started developing generalized symptoms (extremity weakness, difficulty swallowing, and severe ptosis). He is currently on prednisone to 7.5 every day and has not required IVIG or cellcept.     Previsit Tests   CT Chest (10/29/2020)  Small soft tissue density in the anterosuperior mediastinum, presumed residual thymus    PMH     Past Medical History           Past Medical History:   Diagnosis Date     Asthma       Kidney stone       Myasthenia gravis (H)       Strabismus         Hashimoto's     PSH     Past Surgical History             Past Surgical History:   Procedure Laterality Date     HERNIA REPAIR         LASER HOLMIUM LITHOTRIPSY URETER(S), INSERT STENT, COMBINED Right 10/15/2014     Procedure: COMBINED CYSTOSCOPY, URETEROSCOPY, LASER HOLMIUM LITHOTRIPSY URETER(S), INSERT STENT;  Surgeon: Thong Bates MD;  Location: [facility]     RECESSION RESECTION WITH ADJUSTABLE SUTURE   9/4/2012     LLRc 5.0mm on adj. LIRc 4.5mm on adj.     RECESSION RESECTION WITH ADJUSTABLE SUTURE BILATERAL   12/18/2012     RSRc 3.0mm; adj. suture     STRABISMUS SURGERY           Hernia repair as infant      Home Medications:  Prednisone 7.5mg  Pyridostigmine  Trazodone  Levothyroxine     ETOH: None  Tobacco: Never  BMI 27     Physical examination  No eye droop, otherwise he appears quite healthy.    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 His hobby is playing the [employer] in a band.     IMPRESSION (G70.01) Myasthenia gravis with exacerbation (H)     55 year old year-old male with non-thymomatous myasthenia gravis.     PLAN  I spent a total  of 25 minutes with Dr. Pratt, more than 50% of which were spent in counseling, coordination of care, and face-to-face time. I reviewed the plan as follows:     Procedure planned: subxiphoid bilateral VATS thymectomy with possible sternotomy (3rd week of March).  I reviewed the indications, risks, and benefits of the procedure with Dr. Leonidas Pratt. We discussed the intraoperative risks of bleeding and injury to vital organs, potential postoperative complications including, but not limited to, major respiratory events, arrhythmia, bleeding, infection, reoperation, and death. I explained the anticipated hospital course (+/- 2 days) and postoperative recovery including pain control, chest drain management, and variable degrees of dyspnea (or need for supplemental oxygen) and fatigue that tend to get better with time.             All questions were answered and Leonidas Pratt and present family were in agreement with the plan.  I appreciate the opportunity to participate in the care of your patient and will keep you updated.  Sincerely,

## 2021-03-22 ENCOUNTER — APPOINTMENT (OUTPATIENT)
Dept: GENERAL RADIOLOGY | Facility: CLINIC | Age: 56
DRG: 042 | End: 2021-03-22
Attending: STUDENT IN AN ORGANIZED HEALTH CARE EDUCATION/TRAINING PROGRAM
Payer: COMMERCIAL

## 2021-03-22 ENCOUNTER — ANESTHESIA (OUTPATIENT)
Dept: SURGERY | Facility: CLINIC | Age: 56
DRG: 042 | End: 2021-03-22
Payer: COMMERCIAL

## 2021-03-22 ENCOUNTER — HOSPITAL ENCOUNTER (INPATIENT)
Facility: CLINIC | Age: 56
LOS: 2 days | Discharge: HOME | DRG: 042 | End: 2021-03-24
Attending: THORACIC SURGERY (CARDIOTHORACIC VASCULAR SURGERY) | Admitting: THORACIC SURGERY (CARDIOTHORACIC VASCULAR SURGERY)
Payer: COMMERCIAL

## 2021-03-22 DIAGNOSIS — G70.01 MYASTHENIA GRAVIS WITH EXACERBATION (H): ICD-10-CM

## 2021-03-22 DIAGNOSIS — G70.00 OCULAR MYASTHENIA (H): ICD-10-CM

## 2021-03-22 LAB
ABO + RH BLD: NORMAL
ABO + RH BLD: NORMAL
BASE DEFICIT BLDA-SCNC: 3.2 MMOL/L
BASE DEFICIT BLDA-SCNC: 4 MMOL/L
BLD GP AB SCN SERPL QL: NORMAL
BLOOD BANK CMNT PATIENT-IMP: NORMAL
BLOOD BANK CMNT PATIENT-IMP: NORMAL
CA-I BLD-MCNC: 4.6 MG/DL (ref 4.4–5.2)
CA-I BLD-MCNC: 4.9 MG/DL (ref 4.4–5.2)
CREAT SERPL-MCNC: 1.02 MG/DL (ref 0.66–1.25)
GFR SERPL CREATININE-BSD FRML MDRD: 83 ML/MIN/{1.73_M2}
GLUCOSE BLD-MCNC: 153 MG/DL (ref 70–99)
GLUCOSE BLD-MCNC: 166 MG/DL (ref 70–99)
GLUCOSE BLDC GLUCOMTR-MCNC: 86 MG/DL (ref 70–99)
HCO3 BLD-SCNC: 23 MMOL/L (ref 21–28)
HCO3 BLD-SCNC: 26 MMOL/L (ref 21–28)
HGB BLD-MCNC: 13.9 G/DL (ref 13.3–17.7)
HGB BLD-MCNC: 14.5 G/DL (ref 13.3–17.7)
LACTATE BLD-SCNC: 1 MMOL/L (ref 0.7–2)
LACTATE BLD-SCNC: 1.1 MMOL/L (ref 0.7–2)
O2/TOTAL GAS SETTING VFR VENT: 100 %
O2/TOTAL GAS SETTING VFR VENT: 71 %
PCO2 BLD: 50 MM HG (ref 35–45)
PCO2 BLD: 61 MM HG (ref 35–45)
PH BLD: 7.23 PH (ref 7.35–7.45)
PH BLD: 7.28 PH (ref 7.35–7.45)
PO2 BLD: 112 MM HG (ref 80–105)
PO2 BLD: 82 MM HG (ref 80–105)
POTASSIUM BLD-SCNC: 4.3 MMOL/L (ref 3.4–5.3)
POTASSIUM BLD-SCNC: 4.3 MMOL/L (ref 3.4–5.3)
SODIUM BLD-SCNC: 139 MMOL/L (ref 133–144)
SODIUM BLD-SCNC: 140 MMOL/L (ref 133–144)
SPECIMEN EXP DATE BLD: NORMAL

## 2021-03-22 PROCEDURE — 88342 IMHCHEM/IMCYTCHM 1ST ANTB: CPT | Mod: 26 | Performed by: PATHOLOGY

## 2021-03-22 PROCEDURE — 88341 IMHCHEM/IMCYTCHM EA ADD ANTB: CPT | Mod: TC | Performed by: THORACIC SURGERY (CARDIOTHORACIC VASCULAR SURGERY)

## 2021-03-22 PROCEDURE — 36415 COLL VENOUS BLD VENIPUNCTURE: CPT | Performed by: STUDENT IN AN ORGANIZED HEALTH CARE EDUCATION/TRAINING PROGRAM

## 2021-03-22 PROCEDURE — 250N000009 HC RX 250: Performed by: STUDENT IN AN ORGANIZED HEALTH CARE EDUCATION/TRAINING PROGRAM

## 2021-03-22 PROCEDURE — 07TM4ZZ RESECTION OF THYMUS, PERCUTANEOUS ENDOSCOPIC APPROACH: ICD-10-PCS | Performed by: THORACIC SURGERY (CARDIOTHORACIC VASCULAR SURGERY)

## 2021-03-22 PROCEDURE — 82803 BLOOD GASES ANY COMBINATION: CPT

## 2021-03-22 PROCEDURE — 250N000011 HC RX IP 250 OP 636: Performed by: STUDENT IN AN ORGANIZED HEALTH CARE EDUCATION/TRAINING PROGRAM

## 2021-03-22 PROCEDURE — 82947 ASSAY GLUCOSE BLOOD QUANT: CPT

## 2021-03-22 PROCEDURE — 88342 IMHCHEM/IMCYTCHM 1ST ANTB: CPT | Mod: TC | Performed by: THORACIC SURGERY (CARDIOTHORACIC VASCULAR SURGERY)

## 2021-03-22 PROCEDURE — 710N000009 HC RECOVERY PHASE 1, LEVEL 1, PER MIN: Performed by: THORACIC SURGERY (CARDIOTHORACIC VASCULAR SURGERY)

## 2021-03-22 PROCEDURE — 84295 ASSAY OF SERUM SODIUM: CPT | Performed by: THORACIC SURGERY (CARDIOTHORACIC VASCULAR SURGERY)

## 2021-03-22 PROCEDURE — 84295 ASSAY OF SERUM SODIUM: CPT

## 2021-03-22 PROCEDURE — 82330 ASSAY OF CALCIUM: CPT

## 2021-03-22 PROCEDURE — 83605 ASSAY OF LACTIC ACID: CPT

## 2021-03-22 PROCEDURE — 250N000006 HC OR RX SURGIFLO W/THROMBIN KIT 2ML 1991 OPNP: Performed by: THORACIC SURGERY (CARDIOTHORACIC VASCULAR SURGERY)

## 2021-03-22 PROCEDURE — 250N000025 HC SEVOFLURANE, PER MIN: Performed by: THORACIC SURGERY (CARDIOTHORACIC VASCULAR SURGERY)

## 2021-03-22 PROCEDURE — 82330 ASSAY OF CALCIUM: CPT | Performed by: THORACIC SURGERY (CARDIOTHORACIC VASCULAR SURGERY)

## 2021-03-22 PROCEDURE — 88304 TISSUE EXAM BY PATHOLOGIST: CPT | Mod: TC | Performed by: THORACIC SURGERY (CARDIOTHORACIC VASCULAR SURGERY)

## 2021-03-22 PROCEDURE — 84132 ASSAY OF SERUM POTASSIUM: CPT | Performed by: THORACIC SURGERY (CARDIOTHORACIC VASCULAR SURGERY)

## 2021-03-22 PROCEDURE — 88341 IMHCHEM/IMCYTCHM EA ADD ANTB: CPT | Mod: 26 | Performed by: PATHOLOGY

## 2021-03-22 PROCEDURE — 120N000002 HC R&B MED SURG/OB UMMC

## 2021-03-22 PROCEDURE — 250N000013 HC RX MED GY IP 250 OP 250 PS 637: Performed by: STUDENT IN AN ORGANIZED HEALTH CARE EDUCATION/TRAINING PROGRAM

## 2021-03-22 PROCEDURE — 71045 X-RAY EXAM CHEST 1 VIEW: CPT | Mod: 26 | Performed by: RADIOLOGY

## 2021-03-22 PROCEDURE — 272N000001 HC OR GENERAL SUPPLY STERILE: Performed by: THORACIC SURGERY (CARDIOTHORACIC VASCULAR SURGERY)

## 2021-03-22 PROCEDURE — 86900 BLOOD TYPING SEROLOGIC ABO: CPT | Performed by: STUDENT IN AN ORGANIZED HEALTH CARE EDUCATION/TRAINING PROGRAM

## 2021-03-22 PROCEDURE — 88304 TISSUE EXAM BY PATHOLOGIST: CPT | Mod: 26 | Performed by: PATHOLOGY

## 2021-03-22 PROCEDURE — 83605 ASSAY OF LACTIC ACID: CPT | Performed by: THORACIC SURGERY (CARDIOTHORACIC VASCULAR SURGERY)

## 2021-03-22 PROCEDURE — 370N000017 HC ANESTHESIA TECHNICAL FEE, PER MIN: Performed by: THORACIC SURGERY (CARDIOTHORACIC VASCULAR SURGERY)

## 2021-03-22 PROCEDURE — 999N000141 HC STATISTIC PRE-PROCEDURE NURSING ASSESSMENT: Performed by: THORACIC SURGERY (CARDIOTHORACIC VASCULAR SURGERY)

## 2021-03-22 PROCEDURE — 250N000011 HC RX IP 250 OP 636: Performed by: THORACIC SURGERY (CARDIOTHORACIC VASCULAR SURGERY)

## 2021-03-22 PROCEDURE — 999N001017 HC STATISTIC GLUCOSE BY METER IP

## 2021-03-22 PROCEDURE — 360N000077 HC SURGERY LEVEL 4, PER MIN: Performed by: THORACIC SURGERY (CARDIOTHORACIC VASCULAR SURGERY)

## 2021-03-22 PROCEDURE — 258N000003 HC RX IP 258 OP 636: Performed by: STUDENT IN AN ORGANIZED HEALTH CARE EDUCATION/TRAINING PROGRAM

## 2021-03-22 PROCEDURE — 250N000013 HC RX MED GY IP 250 OP 250 PS 637: Performed by: NURSE PRACTITIONER

## 2021-03-22 PROCEDURE — 999N000065 XR CHEST PORT 1 VW

## 2021-03-22 PROCEDURE — 82803 BLOOD GASES ANY COMBINATION: CPT | Performed by: THORACIC SURGERY (CARDIOTHORACIC VASCULAR SURGERY)

## 2021-03-22 PROCEDURE — 82565 ASSAY OF CREATININE: CPT | Performed by: STUDENT IN AN ORGANIZED HEALTH CARE EDUCATION/TRAINING PROGRAM

## 2021-03-22 PROCEDURE — 88302 TISSUE EXAM BY PATHOLOGIST: CPT | Mod: 26 | Performed by: PATHOLOGY

## 2021-03-22 PROCEDURE — 250N000009 HC RX 250: Performed by: THORACIC SURGERY (CARDIOTHORACIC VASCULAR SURGERY)

## 2021-03-22 PROCEDURE — 82947 ASSAY GLUCOSE BLOOD QUANT: CPT | Performed by: THORACIC SURGERY (CARDIOTHORACIC VASCULAR SURGERY)

## 2021-03-22 PROCEDURE — 84132 ASSAY OF SERUM POTASSIUM: CPT

## 2021-03-22 RX ORDER — ACETAMINOPHEN 325 MG/1
650 TABLET ORAL EVERY 4 HOURS PRN
Status: DISCONTINUED | OUTPATIENT
Start: 2021-03-25 | End: 2021-03-23

## 2021-03-22 RX ORDER — SODIUM CHLORIDE, SODIUM LACTATE, POTASSIUM CHLORIDE, CALCIUM CHLORIDE 600; 310; 30; 20 MG/100ML; MG/100ML; MG/100ML; MG/100ML
INJECTION, SOLUTION INTRAVENOUS CONTINUOUS
Status: DISCONTINUED | OUTPATIENT
Start: 2021-03-22 | End: 2021-03-22 | Stop reason: HOSPADM

## 2021-03-22 RX ORDER — ONDANSETRON 4 MG/1
4 TABLET, ORALLY DISINTEGRATING ORAL EVERY 6 HOURS PRN
Status: DISCONTINUED | OUTPATIENT
Start: 2021-03-22 | End: 2021-03-24 | Stop reason: HOSPADM

## 2021-03-22 RX ORDER — FENTANYL CITRATE 50 UG/ML
25-50 INJECTION, SOLUTION INTRAMUSCULAR; INTRAVENOUS EVERY 5 MIN PRN
Status: DISCONTINUED | OUTPATIENT
Start: 2021-03-22 | End: 2021-03-22 | Stop reason: HOSPADM

## 2021-03-22 RX ORDER — SODIUM CHLORIDE, SODIUM LACTATE, POTASSIUM CHLORIDE, CALCIUM CHLORIDE 600; 310; 30; 20 MG/100ML; MG/100ML; MG/100ML; MG/100ML
INJECTION, SOLUTION INTRAVENOUS CONTINUOUS PRN
Status: DISCONTINUED | OUTPATIENT
Start: 2021-03-22 | End: 2021-03-22

## 2021-03-22 RX ORDER — NALOXONE HYDROCHLORIDE 0.4 MG/ML
0.4 INJECTION, SOLUTION INTRAMUSCULAR; INTRAVENOUS; SUBCUTANEOUS
Status: DISCONTINUED | OUTPATIENT
Start: 2021-03-22 | End: 2021-03-24 | Stop reason: HOSPADM

## 2021-03-22 RX ORDER — FAMOTIDINE 20 MG/1
20 TABLET, FILM COATED ORAL 2 TIMES DAILY
Status: DISCONTINUED | OUTPATIENT
Start: 2021-03-22 | End: 2021-03-24 | Stop reason: HOSPADM

## 2021-03-22 RX ORDER — LIDOCAINE 4 G/G
1-2 PATCH TOPICAL
Status: DISCONTINUED | OUTPATIENT
Start: 2021-03-22 | End: 2021-03-24 | Stop reason: HOSPADM

## 2021-03-22 RX ORDER — GABAPENTIN 300 MG/1
300 CAPSULE ORAL AT BEDTIME
Status: DISCONTINUED | OUTPATIENT
Start: 2021-03-22 | End: 2021-03-24 | Stop reason: HOSPADM

## 2021-03-22 RX ORDER — ONDANSETRON 4 MG/1
4 TABLET, ORALLY DISINTEGRATING ORAL EVERY 30 MIN PRN
Status: DISCONTINUED | OUTPATIENT
Start: 2021-03-22 | End: 2021-03-22 | Stop reason: HOSPADM

## 2021-03-22 RX ORDER — METHOCARBAMOL 750 MG/1
750 TABLET, FILM COATED ORAL EVERY 6 HOURS PRN
Status: DISCONTINUED | OUTPATIENT
Start: 2021-03-22 | End: 2021-03-24 | Stop reason: HOSPADM

## 2021-03-22 RX ORDER — SODIUM CHLORIDE 9 MG/ML
INJECTION, SOLUTION INTRAVENOUS CONTINUOUS
Status: DISCONTINUED | OUTPATIENT
Start: 2021-03-22 | End: 2021-03-23

## 2021-03-22 RX ORDER — BISACODYL 10 MG
10 SUPPOSITORY, RECTAL RECTAL DAILY PRN
Status: DISCONTINUED | OUTPATIENT
Start: 2021-03-22 | End: 2021-03-24 | Stop reason: HOSPADM

## 2021-03-22 RX ORDER — ONDANSETRON 2 MG/ML
4 INJECTION INTRAMUSCULAR; INTRAVENOUS EVERY 6 HOURS PRN
Status: DISCONTINUED | OUTPATIENT
Start: 2021-03-22 | End: 2021-03-24 | Stop reason: HOSPADM

## 2021-03-22 RX ORDER — GINSENG 100 MG
CAPSULE ORAL 3 TIMES DAILY
Status: DISCONTINUED | OUTPATIENT
Start: 2021-03-22 | End: 2021-03-23

## 2021-03-22 RX ORDER — ACETAMINOPHEN 325 MG/1
975 TABLET ORAL ONCE
Status: COMPLETED | OUTPATIENT
Start: 2021-03-22 | End: 2021-03-22

## 2021-03-22 RX ORDER — PROCHLORPERAZINE MALEATE 5 MG
10 TABLET ORAL EVERY 6 HOURS PRN
Status: DISCONTINUED | OUTPATIENT
Start: 2021-03-22 | End: 2021-03-24 | Stop reason: HOSPADM

## 2021-03-22 RX ORDER — LIDOCAINE 40 MG/G
CREAM TOPICAL
Status: DISCONTINUED | OUTPATIENT
Start: 2021-03-22 | End: 2021-03-22 | Stop reason: HOSPADM

## 2021-03-22 RX ORDER — CEFAZOLIN SODIUM 2 G/100ML
2 INJECTION, SOLUTION INTRAVENOUS SEE ADMIN INSTRUCTIONS
Status: DISCONTINUED | OUTPATIENT
Start: 2021-03-22 | End: 2021-03-22 | Stop reason: HOSPADM

## 2021-03-22 RX ORDER — FENTANYL CITRATE 50 UG/ML
INJECTION, SOLUTION INTRAMUSCULAR; INTRAVENOUS PRN
Status: DISCONTINUED | OUTPATIENT
Start: 2021-03-22 | End: 2021-03-22

## 2021-03-22 RX ORDER — PROPOFOL 10 MG/ML
INJECTION, EMULSION INTRAVENOUS PRN
Status: DISCONTINUED | OUTPATIENT
Start: 2021-03-22 | End: 2021-03-22

## 2021-03-22 RX ORDER — AMOXICILLIN 250 MG
1 CAPSULE ORAL 2 TIMES DAILY
Status: DISCONTINUED | OUTPATIENT
Start: 2021-03-22 | End: 2021-03-24 | Stop reason: HOSPADM

## 2021-03-22 RX ORDER — ALBUTEROL SULFATE 90 UG/1
2 AEROSOL, METERED RESPIRATORY (INHALATION) EVERY 6 HOURS PRN
Status: DISCONTINUED | OUTPATIENT
Start: 2021-03-22 | End: 2021-03-24 | Stop reason: HOSPADM

## 2021-03-22 RX ORDER — NALOXONE HYDROCHLORIDE 0.4 MG/ML
0.2 INJECTION, SOLUTION INTRAMUSCULAR; INTRAVENOUS; SUBCUTANEOUS
Status: DISCONTINUED | OUTPATIENT
Start: 2021-03-22 | End: 2021-03-24 | Stop reason: HOSPADM

## 2021-03-22 RX ORDER — ONDANSETRON 2 MG/ML
4 INJECTION INTRAMUSCULAR; INTRAVENOUS EVERY 30 MIN PRN
Status: DISCONTINUED | OUTPATIENT
Start: 2021-03-22 | End: 2021-03-22 | Stop reason: HOSPADM

## 2021-03-22 RX ORDER — HYDROMORPHONE HYDROCHLORIDE 1 MG/ML
.3-.5 INJECTION, SOLUTION INTRAMUSCULAR; INTRAVENOUS; SUBCUTANEOUS EVERY 5 MIN PRN
Status: DISCONTINUED | OUTPATIENT
Start: 2021-03-22 | End: 2021-03-22 | Stop reason: HOSPADM

## 2021-03-22 RX ORDER — CELECOXIB 200 MG/1
200 CAPSULE ORAL ONCE
Status: COMPLETED | OUTPATIENT
Start: 2021-03-22 | End: 2021-03-22

## 2021-03-22 RX ORDER — POLYETHYLENE GLYCOL 3350 17 G/17G
17 POWDER, FOR SOLUTION ORAL DAILY
Status: DISCONTINUED | OUTPATIENT
Start: 2021-03-23 | End: 2021-03-24 | Stop reason: HOSPADM

## 2021-03-22 RX ORDER — LIDOCAINE HYDROCHLORIDE 20 MG/ML
INJECTION, SOLUTION INFILTRATION; PERINEURAL PRN
Status: DISCONTINUED | OUTPATIENT
Start: 2021-03-22 | End: 2021-03-22

## 2021-03-22 RX ORDER — CHLORHEXIDINE GLUCONATE ORAL RINSE 1.2 MG/ML
15 SOLUTION DENTAL ONCE
Status: COMPLETED | OUTPATIENT
Start: 2021-03-22 | End: 2021-03-22

## 2021-03-22 RX ORDER — GABAPENTIN 300 MG/1
300 CAPSULE ORAL ONCE
Status: COMPLETED | OUTPATIENT
Start: 2021-03-22 | End: 2021-03-22

## 2021-03-22 RX ORDER — LEVOTHYROXINE SODIUM 150 UG/1
150 TABLET ORAL EVERY MORNING
Status: DISCONTINUED | OUTPATIENT
Start: 2021-03-23 | End: 2021-03-24 | Stop reason: HOSPADM

## 2021-03-22 RX ORDER — CALCIUM CARBONATE 500 MG/1
500 TABLET, CHEWABLE ORAL 4 TIMES DAILY PRN
Status: DISCONTINUED | OUTPATIENT
Start: 2021-03-22 | End: 2021-03-24 | Stop reason: HOSPADM

## 2021-03-22 RX ORDER — BUPIVACAINE HYDROCHLORIDE AND EPINEPHRINE 5; 5 MG/ML; UG/ML
INJECTION, SOLUTION PERINEURAL PRN
Status: DISCONTINUED | OUTPATIENT
Start: 2021-03-22 | End: 2021-03-22 | Stop reason: HOSPADM

## 2021-03-22 RX ORDER — LIDOCAINE 40 MG/G
CREAM TOPICAL
Status: DISCONTINUED | OUTPATIENT
Start: 2021-03-22 | End: 2021-03-24 | Stop reason: HOSPADM

## 2021-03-22 RX ORDER — CEFAZOLIN SODIUM 2 G/100ML
2 INJECTION, SOLUTION INTRAVENOUS
Status: DISCONTINUED | OUTPATIENT
Start: 2021-03-22 | End: 2021-03-22 | Stop reason: HOSPADM

## 2021-03-22 RX ORDER — ONDANSETRON 2 MG/ML
INJECTION INTRAMUSCULAR; INTRAVENOUS PRN
Status: DISCONTINUED | OUTPATIENT
Start: 2021-03-22 | End: 2021-03-22

## 2021-03-22 RX ORDER — ACETAMINOPHEN 325 MG/1
975 TABLET ORAL EVERY 8 HOURS
Status: DISCONTINUED | OUTPATIENT
Start: 2021-03-22 | End: 2021-03-23

## 2021-03-22 RX ORDER — CEFAZOLIN SODIUM 1 G/3ML
INJECTION, POWDER, FOR SOLUTION INTRAMUSCULAR; INTRAVENOUS PRN
Status: DISCONTINUED | OUTPATIENT
Start: 2021-03-22 | End: 2021-03-22

## 2021-03-22 RX ADMIN — LIDOCAINE 1 PATCH: 560 PATCH PERCUTANEOUS; TOPICAL; TRANSDERMAL at 19:33

## 2021-03-22 RX ADMIN — HYDROMORPHONE HYDROCHLORIDE 0.5 MG: 1 INJECTION, SOLUTION INTRAMUSCULAR; INTRAVENOUS; SUBCUTANEOUS at 08:42

## 2021-03-22 RX ADMIN — HYDROMORPHONE HYDROCHLORIDE 0.5 MG: 1 INJECTION, SOLUTION INTRAMUSCULAR; INTRAVENOUS; SUBCUTANEOUS at 08:38

## 2021-03-22 RX ADMIN — LIDOCAINE HYDROCHLORIDE 100 MG: 20 INJECTION, SOLUTION INFILTRATION; PERINEURAL at 07:34

## 2021-03-22 RX ADMIN — ONDANSETRON 4 MG: 2 INJECTION INTRAMUSCULAR; INTRAVENOUS at 08:35

## 2021-03-22 RX ADMIN — CEFAZOLIN 1 G: 1 INJECTION, POWDER, FOR SOLUTION INTRAMUSCULAR; INTRAVENOUS at 10:00

## 2021-03-22 RX ADMIN — FENTANYL CITRATE 150 MCG: 50 INJECTION, SOLUTION INTRAMUSCULAR; INTRAVENOUS at 07:34

## 2021-03-22 RX ADMIN — GABAPENTIN 300 MG: 300 CAPSULE ORAL at 06:58

## 2021-03-22 RX ADMIN — ROCURONIUM BROMIDE 20 MG: 10 INJECTION INTRAVENOUS at 09:20

## 2021-03-22 RX ADMIN — FAMOTIDINE 20 MG: 20 INJECTION, SOLUTION INTRAVENOUS at 19:32

## 2021-03-22 RX ADMIN — FENTANYL CITRATE 50 MCG: 50 INJECTION, SOLUTION INTRAMUSCULAR; INTRAVENOUS at 08:30

## 2021-03-22 RX ADMIN — HYDROCORTISONE SODIUM SUCCINATE 100 MG: 100 INJECTION, POWDER, FOR SOLUTION INTRAMUSCULAR; INTRAVENOUS at 08:00

## 2021-03-22 RX ADMIN — GABAPENTIN 300 MG: 300 CAPSULE ORAL at 21:36

## 2021-03-22 RX ADMIN — ROCURONIUM BROMIDE 20 MG: 10 INJECTION INTRAVENOUS at 08:53

## 2021-03-22 RX ADMIN — FENTANYL CITRATE 25 MCG: 50 INJECTION, SOLUTION INTRAMUSCULAR; INTRAVENOUS at 12:09

## 2021-03-22 RX ADMIN — ROCURONIUM BROMIDE 20 MG: 10 INJECTION INTRAVENOUS at 08:23

## 2021-03-22 RX ADMIN — PROPOFOL 40 MG: 10 INJECTION, EMULSION INTRAVENOUS at 08:38

## 2021-03-22 RX ADMIN — SUGAMMADEX 200 MG: 100 INJECTION, SOLUTION INTRAVENOUS at 10:52

## 2021-03-22 RX ADMIN — CELECOXIB 200 MG: 200 CAPSULE ORAL at 06:58

## 2021-03-22 RX ADMIN — ACETAMINOPHEN 975 MG: 325 TABLET ORAL at 19:31

## 2021-03-22 RX ADMIN — FENTANYL CITRATE 25 MCG: 50 INJECTION, SOLUTION INTRAMUSCULAR; INTRAVENOUS at 11:46

## 2021-03-22 RX ADMIN — CHLORHEXIDINE GLUCONATE 0.12% ORAL RINSE 15 ML: 1.2 LIQUID ORAL at 07:01

## 2021-03-22 RX ADMIN — ACETAMINOPHEN 975 MG: 325 TABLET, FILM COATED ORAL at 06:58

## 2021-03-22 RX ADMIN — DOCUSATE SODIUM AND SENNOSIDES 1 TABLET: 8.6; 5 TABLET, FILM COATED ORAL at 19:33

## 2021-03-22 RX ADMIN — FENTANYL CITRATE 50 MCG: 50 INJECTION, SOLUTION INTRAMUSCULAR; INTRAVENOUS at 10:33

## 2021-03-22 RX ADMIN — ROCURONIUM BROMIDE 80 MG: 10 INJECTION INTRAVENOUS at 07:34

## 2021-03-22 RX ADMIN — PROPOFOL 160 MG: 10 INJECTION, EMULSION INTRAVENOUS at 07:34

## 2021-03-22 RX ADMIN — HYDROMORPHONE HYDROCHLORIDE 0.3 MG: 1 INJECTION, SOLUTION INTRAMUSCULAR; INTRAVENOUS; SUBCUTANEOUS at 13:30

## 2021-03-22 RX ADMIN — MIDAZOLAM 2 MG: 1 INJECTION INTRAMUSCULAR; INTRAVENOUS at 07:24

## 2021-03-22 RX ADMIN — SODIUM CHLORIDE, POTASSIUM CHLORIDE, SODIUM LACTATE AND CALCIUM CHLORIDE: 600; 310; 30; 20 INJECTION, SOLUTION INTRAVENOUS at 07:26

## 2021-03-22 RX ADMIN — SODIUM CHLORIDE, POTASSIUM CHLORIDE, SODIUM LACTATE AND CALCIUM CHLORIDE: 600; 310; 30; 20 INJECTION, SOLUTION INTRAVENOUS at 08:05

## 2021-03-22 RX ADMIN — Medication 2 G: at 08:00

## 2021-03-22 RX ADMIN — Medication: at 13:47

## 2021-03-22 RX ADMIN — SODIUM CHLORIDE: 9 INJECTION, SOLUTION INTRAVENOUS at 13:55

## 2021-03-22 ASSESSMENT — ACTIVITIES OF DAILY LIVING (ADL)
ADLS_ACUITY_SCORE: 16
ADLS_ACUITY_SCORE: 15
CONCENTRATING,_REMEMBERING_OR_MAKING_DECISIONS_DIFFICULTY: NO

## 2021-03-22 ASSESSMENT — MIFFLIN-ST. JEOR: SCORE: 1634.5

## 2021-03-22 NOTE — BRIEF OP NOTE
[facility]    Brief Operative Note    Pre-operative diagnosis: Myasthenia gravis with exacerbation (H) [G70.01]  Post-operative diagnosis Same as pre-operative diagnosis    Procedure: Procedure(s):  SUBXIPHOID BILATERAL THORACOSCOPIC THYMECTOMY  Surgeon: Surgeon(s) and Role:     * Benjy Hull MD - Primary     * Roselyn Sousa MD - Resident - Assisting  Anesthesia: General   Estimated blood loss: 50mL  Drains: Hector drain in R pleural space  Specimens:   ID Type Source Tests Collected by Time Destination   A : Right Pericardial Fat  Tissue Other SURGICAL PATHOLOGY EXAM Benjy Hull MD 3/22/2021 10:03 AM    B : Thymus  Tissue Thymus SURGICAL PATHOLOGY EXAM Benjy Hull MD 3/22/2021 10:56 AM      Findings:   thymus removed.  Complications: None.  Implants: * No implants in log *      Roselyn Sousa MD  PGY-3 General Surgery

## 2021-03-22 NOTE — OP NOTE
Procedure Date: 03/22/2021      PREOPERATIVE DIAGNOSIS:  Myasthenia gravis.      POSTOPERATIVE DIAGNOSIS:  Myasthenia gravis.      PROCEDURE PERFORMED:  Subxiphoid subcostal bilateral thoracoscopic thymectomy.      SURGEON:  Benjy Hull MD (present and participated in the entire procedure).      RESIDENT SURGEON:  Roselyn Sousa MD      ANESTHESIA:  General.      ESTIMATED BLOOD LOSS:  50  mL.      COMPLICATIONS:  None immediate.      FINDINGS:  There was no gross evidence of thymoma, which was in concordance with the CT scan.      DESCRIPTION OF PROCEDURE:  With the patient in supine position under general anesthesia with a single lumen tube in place, the neck, chest and abdomen were prepared and draped in the conventional fashion.  We used a 5-port approach and the first port was made in the subxiphoid position.  We entered bluntly into the right pleural space.  Once we verified position with the scope, then we placed the 2 paramedian 5 mm ports with digital palpation and then the 2 most lateral subcostal ports under direct visualization with downward pressure on the diaphragm.  For the left subcostal port, I made a 1 cm fascial incision, so I could first create a path with my finger prior to inserting the 5 mm port.      We then proceeded to take all the fat from phrenic to phrenic and thoracic inlet to diaphragm.  We clearly identified the phrenic nerves on both sides and completely skeletonized them.  We avoided heat and the proximity of the phrenic nerves, all small vessels along the phrenic nerves were clipped and then cut sharply.  We then dissected all the way up and we had a small amount of bleeding from the internal mammary vein on the right side, which we could easily control with clips.      Once we had the specimen completely freed up, we placed it into a 15 mm EndoCatch and removed it through the subxiphoid port.  We ensured hemostasis in the chest and placed a 19 Hector drain into the right  pleural space.      After this, we introduced the subxiphoid port into the peritoneal cavity and verified that both our subcostal ports had been indeed extraperitoneal.  We then closed all incisions with absorbable sutures in the conventional fashion.      The patient tolerated the procedure well.         LILLIAM PIKE MD             D: 2021   T: 2021   MT: GH      Name:     NELL MULLIGAN   MRN:      [id number]        Account:        [id number]   :      1965           Procedure Date: 2021      Document: [id number]

## 2021-03-22 NOTE — ANESTHESIA POSTPROCEDURE EVALUATION
Patient: Leonidas Pratt    Procedure(s):  SUBXIPHOID BILATERAL THORACOSCOPIC THYMECTOMY    Diagnosis:Myasthenia gravis with exacerbation (H) [G70.01]  Diagnosis Additional Information: No value filed.    Anesthesia Type:  General    Note:  Disposition: Outpatient   Postop Pain Control: Uneventful            Sign Out: Well controlled pain   PONV: No   Neuro/Psych: Uneventful            Sign Out: Acceptable/Baseline neuro status   Airway/Respiratory: Uneventful            Sign Out: Acceptable/Baseline resp. status   CV/Hemodynamics: Uneventful            Sign Out: Acceptable CV status   Other NRE: NONE   DID A NON-ROUTINE EVENT OCCUR? No    Event details/Postop Comments:  No noted anesthetic complications.  Patient satisfied with anesthetic.  CXR reviewed, no obvious lung markings in RUL, but no clinical PTX.             Last vitals:  Vitals:    03/22/21 1400 03/22/21 1415 03/22/21 1528   BP: (!) 136/96 137/87 (!) (P) 140/91   Pulse: 75 76    Resp: 12 12    Temp: 36.6  C (97.9  F)  (P) 36.9  C (98.5  F)   SpO2: 97% 92% (P) 92%       Last vitals prior to Anesthesia Care Transfer:  CRNA VITALS  3/22/2021 1044 - 3/22/2021 1144      3/22/2021             EKG:  Sinus rhythm          Electronically Signed By: Griffin Espinal MD  March 22, 2021  3:30 PM

## 2021-03-22 NOTE — PROVIDER NOTIFICATION
Received pt from PACU around 1500. Pt walked to bathroom upon arrival and voided. Pt's skin intact ex 4 abd port sites and CT to WS. Pt denies SOB but is on 3L CAPNO. Educated on IS use and PCA dilaudid button. Report given to the oncoming nurse.

## 2021-03-22 NOTE — ANESTHESIA PROCEDURE NOTES
Airway       Patient location during procedure: OR  Staff -        Anesthesiologist:  Griffin Espinal MD       Resident/Fellow: Sienna Osborne MD       Performed By: residentIndications and Patient Condition       Indications for airway management: festus-procedural       Induction type:intravenous       Mask difficulty assessment: 1 - vent by mask    Final Airway Details       Final airway type: endotracheal airway       Successful airway: ETT - single  Endotracheal Airway Details        ETT size (mm): 7.5       Cuffed: yes       Successful intubation technique: direct laryngoscopy       DL Blade Type: Arriola 2       Grade View of Cords: 1       Adjucts: stylet       Position: Right       Measured from: gums/teeth       Secured at (cm): 23       Bite block used: None    Post intubation assessment        Placement verified by: capnometry, equal breath sounds and chest rise        Number of attempts at approach: 2 (1st by CA-2, 2nd by attending)       Secured with: silk tape       Ease of procedure: easy       Dentition: Intact    Medication(s) Administered   Medication Administration Time: 3/22/2021 7:36 AM

## 2021-03-22 NOTE — ANESTHESIA PROCEDURE NOTES
Arterial Line Procedure Note  Pre-Procedure   Staff -        Anesthesiologist:  Griffin Espinal MD       Resident/Fellow: Sienna Osborne MD       Performed By: resident       Location: OR       Pre-Anesthestic Checklist: patient identified, IV checked, risks and benefits discussed, informed consent, monitors and equipment checked, pre-op evaluation and at physician/surgeon's request  Timeout:       Correct Patient: Yes        Correct Procedure: Yes        Correct Site: Yes        Correct Position: Yes   Procedure   Procedure: arterial line       Laterality: right       Insertion Site: radial.  Sterile Prep        Standard elements of sterile barrier followed       Skin prep: Chloraprep  Insertion/Injection        Technique: ultrasound guided        Artery evaluated via U/S for patency/adequacy of cortis insertion is adequate, and using realtime U/S imaging the artery was punctured, and needle was observed entering artery on U/S       Catheter Type/Size: 20 G, 1.75 in/4.5 cm quick cath (integral wire)  Narrative        Tegaderm dressing used.       Complications: None apparent,        Arterial waveform: Yes        IBP within 10% of NIBP: Yes

## 2021-03-22 NOTE — ANESTHESIA CARE TRANSFER NOTE
Patient: Leonidas Pratt    Procedure(s):  SUBXIPHOID BILATERAL THORACOSCOPIC THYMECTOMY    Diagnosis: Myasthenia gravis with exacerbation (H) [G70.01]  Diagnosis Additional Information: No value filed.    Anesthesia Type:   No value filed.     Note:      Level of Consciousness: awake  Oxygen Supplementation: face mask  Level of Supplemental Oxygen (L/min / FiO2): 4L  Independent Airway: airway patency satisfactory and stable  Dentition: dentition unchanged  Vital Signs Stable: post-procedure vital signs reviewed and stable  Report to RN Given: handoff report given  Patient transferred to: PACU  Comments: VSS. Breathing spontaneously at a regular rate with adequate tidal volumes and maintaining O2 sats on 4L FM. Denies nausea or pain. No apparent complications from anesthesia.     Sienna Osborne MD on 3/22/2021 at 11:27 AM      Handoff Report: Identifed the Patient, Identified the Reponsible Provider, Reviewed the pertinent medical history, Discussed the surgical course, Reviewed Intra-OP anesthesia mangement and issues during anesthesia, Set expectations for post-procedure period and Allowed opportunity for questions and acknowledgement of understanding      Vitals: (Last set prior to Anesthesia Care Transfer)  CRNA VITALS  3/22/2021 1044 - 3/22/2021 1127      3/22/2021             EKG:  Sinus rhythm        Electronically Signed By: Sienna Osborne MD  March 22, 2021  11:27 AM

## 2021-03-22 NOTE — PHARMACY-ADMISSION MEDICATION HISTORY
Admission Medication History Completed by Pharmacy    See [facility] Admission Navigator for allergy information, preferred outpatient pharmacy, prior to admission medications and immunization status.     Medication History Sources:     Patient, surescripts prescription fill history    Changes made to PTA medication list (reason):    Added: None    Deleted: None    Changed: None    Additional Information:    None    Prior to Admission medications    Medication Sig Last Dose Taking? Auth Provider   levothyroxine (SYNTHROID/LEVOTHROID) 150 MCG tablet Take 1 tablet (150 mcg) by mouth daily  Patient taking differently: Take 150 mcg by mouth every morning  3/22/2021 at 0500 Yes Moreno Marion MD   predniSONE (DELTASONE) 5 MG tablet Take 4 tablets (20 mg) by mouth daily  Patient taking differently: Take 20 mg by mouth every morning  3/22/2021 at 0600 Yes Arnav Melendrez MD   Vitamin D, Cholecalciferol, 25 MCG (1000 UT) TABS Take by mouth every morning 3/21/2021 at Unknown time Yes Reported, Patient   albuterol (PROAIR HFA/PROVENTIL HFA/VENTOLIN HFA) 108 (90 BASE) MCG/ACT Inhaler Inhale 2 puffs into the lungs every 6 hours as needed for shortness of breath / dyspnea or wheezing Unknown at Unknown time  Moreno Marion MD       Date completed: 03/22/21    Medication history completed by: Rosaura Samuel [facility]

## 2021-03-23 ENCOUNTER — APPOINTMENT (OUTPATIENT)
Dept: GENERAL RADIOLOGY | Facility: CLINIC | Age: 56
DRG: 042 | End: 2021-03-23
Attending: STUDENT IN AN ORGANIZED HEALTH CARE EDUCATION/TRAINING PROGRAM
Payer: COMMERCIAL

## 2021-03-23 ENCOUNTER — APPOINTMENT (OUTPATIENT)
Dept: GENERAL RADIOLOGY | Facility: CLINIC | Age: 56
DRG: 042 | End: 2021-03-23
Attending: PHYSICIAN ASSISTANT
Payer: COMMERCIAL

## 2021-03-23 LAB
ANION GAP SERPL CALCULATED.3IONS-SCNC: 3 MMOL/L (ref 3–14)
BUN SERPL-MCNC: 13 MG/DL (ref 7–30)
CALCIUM SERPL-MCNC: 8.8 MG/DL (ref 8.5–10.1)
CHLORIDE SERPL-SCNC: 105 MMOL/L (ref 94–109)
CO2 SERPL-SCNC: 28 MMOL/L (ref 20–32)
CREAT SERPL-MCNC: 1 MG/DL (ref 0.66–1.25)
ERYTHROCYTE [DISTWIDTH] IN BLOOD BY AUTOMATED COUNT: 12.6 % (ref 10–15)
GFR SERPL CREATININE-BSD FRML MDRD: 85 ML/MIN/{1.73_M2}
GLUCOSE SERPL-MCNC: 90 MG/DL (ref 70–99)
HCT VFR BLD AUTO: 41.1 % (ref 40–53)
HGB BLD-MCNC: 13.5 G/DL (ref 13.3–17.7)
MCH RBC QN AUTO: 30.9 PG (ref 26.5–33)
MCHC RBC AUTO-ENTMCNC: 32.8 G/DL (ref 31.5–36.5)
MCV RBC AUTO: 94 FL (ref 78–100)
PLATELET # BLD AUTO: 235 10E9/L (ref 150–450)
POTASSIUM SERPL-SCNC: 3.9 MMOL/L (ref 3.4–5.3)
RBC # BLD AUTO: 4.37 10E12/L (ref 4.4–5.9)
SODIUM SERPL-SCNC: 136 MMOL/L (ref 133–144)
WBC # BLD AUTO: 8.3 10E9/L (ref 4–11)

## 2021-03-23 PROCEDURE — 85027 COMPLETE CBC AUTOMATED: CPT

## 2021-03-23 PROCEDURE — 999N000065 XR CHEST PORT 1 VW

## 2021-03-23 PROCEDURE — 71045 X-RAY EXAM CHEST 1 VIEW: CPT | Mod: 26 | Performed by: RADIOLOGY

## 2021-03-23 PROCEDURE — 250N000011 HC RX IP 250 OP 636: Performed by: STUDENT IN AN ORGANIZED HEALTH CARE EDUCATION/TRAINING PROGRAM

## 2021-03-23 PROCEDURE — 71045 X-RAY EXAM CHEST 1 VIEW: CPT

## 2021-03-23 PROCEDURE — 80048 BASIC METABOLIC PNL TOTAL CA: CPT

## 2021-03-23 PROCEDURE — 36415 COLL VENOUS BLD VENIPUNCTURE: CPT

## 2021-03-23 PROCEDURE — 250N000012 HC RX MED GY IP 250 OP 636 PS 637: Performed by: PHYSICIAN ASSISTANT

## 2021-03-23 PROCEDURE — 120N000002 HC R&B MED SURG/OB UMMC

## 2021-03-23 PROCEDURE — 250N000013 HC RX MED GY IP 250 OP 250 PS 637: Performed by: STUDENT IN AN ORGANIZED HEALTH CARE EDUCATION/TRAINING PROGRAM

## 2021-03-23 PROCEDURE — 250N000013 HC RX MED GY IP 250 OP 250 PS 637: Performed by: PHYSICIAN ASSISTANT

## 2021-03-23 RX ORDER — PREDNISONE 20 MG/1
20 TABLET ORAL EVERY MORNING
Status: DISCONTINUED | OUTPATIENT
Start: 2021-03-23 | End: 2021-03-23

## 2021-03-23 RX ORDER — PREDNISONE 5 MG/1
5 TABLET ORAL EVERY MORNING
Status: DISCONTINUED | OUTPATIENT
Start: 2021-03-23 | End: 2021-03-24 | Stop reason: HOSPADM

## 2021-03-23 RX ORDER — IBUPROFEN 600 MG/1
600 TABLET, FILM COATED ORAL EVERY 6 HOURS PRN
Status: DISCONTINUED | OUTPATIENT
Start: 2021-03-23 | End: 2021-03-24 | Stop reason: HOSPADM

## 2021-03-23 RX ORDER — OXYCODONE HYDROCHLORIDE 5 MG/1
5 TABLET ORAL
Status: DISCONTINUED | OUTPATIENT
Start: 2021-03-23 | End: 2021-03-24 | Stop reason: HOSPADM

## 2021-03-23 RX ORDER — ACETAMINOPHEN 500 MG
1000 TABLET ORAL EVERY 6 HOURS
Status: DISCONTINUED | OUTPATIENT
Start: 2021-03-23 | End: 2021-03-24 | Stop reason: HOSPADM

## 2021-03-23 RX ADMIN — DOCUSATE SODIUM AND SENNOSIDES 1 TABLET: 8.6; 5 TABLET, FILM COATED ORAL at 09:19

## 2021-03-23 RX ADMIN — DOCUSATE SODIUM AND SENNOSIDES 1 TABLET: 8.6; 5 TABLET, FILM COATED ORAL at 20:30

## 2021-03-23 RX ADMIN — PREDNISONE 5 MG: 5 TABLET ORAL at 13:38

## 2021-03-23 RX ADMIN — FAMOTIDINE 20 MG: 20 TABLET ORAL at 20:30

## 2021-03-23 RX ADMIN — OXYCODONE HYDROCHLORIDE 5 MG: 5 TABLET ORAL at 17:59

## 2021-03-23 RX ADMIN — IBUPROFEN 600 MG: 600 TABLET, FILM COATED ORAL at 16:24

## 2021-03-23 RX ADMIN — ACETAMINOPHEN 975 MG: 325 TABLET ORAL at 03:22

## 2021-03-23 RX ADMIN — GABAPENTIN 300 MG: 300 CAPSULE ORAL at 21:10

## 2021-03-23 RX ADMIN — OXYCODONE HYDROCHLORIDE 5 MG: 5 TABLET ORAL at 14:09

## 2021-03-23 RX ADMIN — OXYCODONE HYDROCHLORIDE 5 MG: 5 TABLET ORAL at 21:10

## 2021-03-23 RX ADMIN — LEVOTHYROXINE SODIUM 150 MCG: 150 TABLET ORAL at 09:22

## 2021-03-23 RX ADMIN — ENOXAPARIN SODIUM 40 MG: 40 INJECTION SUBCUTANEOUS at 09:20

## 2021-03-23 RX ADMIN — IBUPROFEN 600 MG: 600 TABLET, FILM COATED ORAL at 10:09

## 2021-03-23 RX ADMIN — ACETAMINOPHEN 975 MG: 325 TABLET ORAL at 11:14

## 2021-03-23 RX ADMIN — ACETAMINOPHEN 1000 MG: 500 TABLET, FILM COATED ORAL at 17:59

## 2021-03-23 RX ADMIN — OXYCODONE HYDROCHLORIDE 5 MG: 5 TABLET ORAL at 10:09

## 2021-03-23 RX ADMIN — FAMOTIDINE 20 MG: 20 TABLET ORAL at 09:21

## 2021-03-23 ASSESSMENT — ACTIVITIES OF DAILY LIVING (ADL)
ADLS_ACUITY_SCORE: 16

## 2021-03-23 NOTE — PROGRESS NOTES
"THORACIC & FOREGUT SURGERY    S:  No acute overnight events. Tolerating diet. Pain control can be improved, dPCA utilized only six times since midnight, had not received any PO pain medications as of this AM.    O:  /77 (BP Location: Right arm)   Pulse 88   Temp 98.3  F (36.8  C) (Oral)   Resp 18   Ht 1.727 m (5' 8\")   Wt 82 kg (180 lb 12.4 oz)   SpO2 92%   BMI 27.49 kg/m      A&Ox3, NAD  Breathing non-labored, on RA  RRR  Abdomen non distended  Distal extremities appear well perfused    Recent Labs   Lab 03/23/21  0700 03/22/21  1617 03/22/21  1028 03/22/21  0924 03/17/21  1521   WBC 8.3  --   --   --  5.6   HGB 13.5  --  13.9 14.5 14.5   MCV 94  --   --   --  92     --   --   --  265     --  139 140 138   POTASSIUM 3.9  --  4.3 4.3 4.5   CHLORIDE 105  --   --   --  105   CO2 28  --   --   --  27   BUN 13  --   --   --  24   CR 1.00 1.02  --   --  1.01   ANIONGAP 3  --   --   --  5   YOAN 8.8  --   --   --  9.0   GLC 90  --  166* 153* 82       A/P: Leonidas Pratt is a 56 year old male with a history of myasthenia gravis now s/p subxiphoid subcostal bilateral thoracoscopic thymectomy on 3/22/2021.    -Pain: Start PO oxycodone, ok to discontinue dPCA this afternoon, continue scheduled APAP, gabapentin, lidocaine patches, and PRN ibuprofen, robaxin  -CT discontinued this AM, post pull CXR reviewed   -Wean O2 as tolerated, O2sa >89 ok  -Restart PTA 5 mg every day prednisone, discussed and confirmed dose with patient and Dr. Jackson (Neurology)  -Encourage OOB, ambulation, IS  -Cont bowel regimen  -Lovenox ppx  -Dispo: likely discharge tomorrow    Moreno Parihs PA-C  Thoracic and Foregut Surgery    "

## 2021-03-24 ENCOUNTER — APPOINTMENT (OUTPATIENT)
Dept: GENERAL RADIOLOGY | Facility: CLINIC | Age: 56
DRG: 042 | End: 2021-03-24
Attending: STUDENT IN AN ORGANIZED HEALTH CARE EDUCATION/TRAINING PROGRAM
Payer: COMMERCIAL

## 2021-03-24 ENCOUNTER — PATIENT OUTREACH (OUTPATIENT)
Dept: CARE COORDINATION | Facility: CLINIC | Age: 56
End: 2021-03-24

## 2021-03-24 VITALS
DIASTOLIC BLOOD PRESSURE: 81 MMHG | RESPIRATION RATE: 18 BRPM | BODY MASS INDEX: 27.4 KG/M2 | SYSTOLIC BLOOD PRESSURE: 135 MMHG | HEIGHT: 68 IN | TEMPERATURE: 98.2 F | OXYGEN SATURATION: 94 % | WEIGHT: 180.78 LBS | HEART RATE: 87 BPM

## 2021-03-24 LAB
ANION GAP SERPL CALCULATED.3IONS-SCNC: 5 MMOL/L (ref 3–14)
BUN SERPL-MCNC: 14 MG/DL (ref 7–30)
CALCIUM SERPL-MCNC: 9.3 MG/DL (ref 8.5–10.1)
CHLORIDE SERPL-SCNC: 105 MMOL/L (ref 94–109)
CO2 SERPL-SCNC: 26 MMOL/L (ref 20–32)
CREAT SERPL-MCNC: 1 MG/DL (ref 0.66–1.25)
ERYTHROCYTE [DISTWIDTH] IN BLOOD BY AUTOMATED COUNT: 12.6 % (ref 10–15)
GFR SERPL CREATININE-BSD FRML MDRD: 85 ML/MIN/{1.73_M2}
GLUCOSE SERPL-MCNC: 85 MG/DL (ref 70–99)
HCT VFR BLD AUTO: 41.3 % (ref 40–53)
HGB BLD-MCNC: 13.8 G/DL (ref 13.3–17.7)
MCH RBC QN AUTO: 31.9 PG (ref 26.5–33)
MCHC RBC AUTO-ENTMCNC: 33.4 G/DL (ref 31.5–36.5)
MCV RBC AUTO: 96 FL (ref 78–100)
PLATELET # BLD AUTO: 225 10E9/L (ref 150–450)
POTASSIUM SERPL-SCNC: 4 MMOL/L (ref 3.4–5.3)
RBC # BLD AUTO: 4.32 10E12/L (ref 4.4–5.9)
SODIUM SERPL-SCNC: 136 MMOL/L (ref 133–144)
WBC # BLD AUTO: 11.1 10E9/L (ref 4–11)

## 2021-03-24 PROCEDURE — 250N000012 HC RX MED GY IP 250 OP 636 PS 637: Performed by: PHYSICIAN ASSISTANT

## 2021-03-24 PROCEDURE — 250N000013 HC RX MED GY IP 250 OP 250 PS 637: Performed by: PHYSICIAN ASSISTANT

## 2021-03-24 PROCEDURE — 36415 COLL VENOUS BLD VENIPUNCTURE: CPT

## 2021-03-24 PROCEDURE — 250N000013 HC RX MED GY IP 250 OP 250 PS 637: Performed by: STUDENT IN AN ORGANIZED HEALTH CARE EDUCATION/TRAINING PROGRAM

## 2021-03-24 PROCEDURE — 250N000011 HC RX IP 250 OP 636: Performed by: STUDENT IN AN ORGANIZED HEALTH CARE EDUCATION/TRAINING PROGRAM

## 2021-03-24 PROCEDURE — 71045 X-RAY EXAM CHEST 1 VIEW: CPT

## 2021-03-24 PROCEDURE — 71045 X-RAY EXAM CHEST 1 VIEW: CPT | Mod: 26 | Performed by: RADIOLOGY

## 2021-03-24 PROCEDURE — 80048 BASIC METABOLIC PNL TOTAL CA: CPT

## 2021-03-24 PROCEDURE — 85027 COMPLETE CBC AUTOMATED: CPT

## 2021-03-24 RX ORDER — IBUPROFEN 600 MG/1
600 TABLET, FILM COATED ORAL EVERY 6 HOURS PRN
COMMUNITY
Start: 2021-03-24 | End: 2021-07-09

## 2021-03-24 RX ORDER — AMOXICILLIN 250 MG
1 CAPSULE ORAL 2 TIMES DAILY
Qty: 14 TABLET | Refills: 0 | Status: SHIPPED | OUTPATIENT
Start: 2021-03-24 | End: 2021-07-09

## 2021-03-24 RX ORDER — PREDNISONE 5 MG/1
5 TABLET ORAL DAILY
Qty: 120 TABLET | Refills: 11 | COMMUNITY
Start: 2021-03-24 | End: 2021-10-30

## 2021-03-24 RX ORDER — OXYCODONE HYDROCHLORIDE 5 MG/1
5 TABLET ORAL
Qty: 50 TABLET | Refills: 0 | Status: SHIPPED | OUTPATIENT
Start: 2021-03-24 | End: 2021-07-09

## 2021-03-24 RX ORDER — POLYETHYLENE GLYCOL 3350 17 G/17G
17 POWDER, FOR SOLUTION ORAL DAILY
Qty: 7 PACKET | Refills: 0 | Status: SHIPPED | OUTPATIENT
Start: 2021-03-25 | End: 2021-07-09

## 2021-03-24 RX ORDER — ACETAMINOPHEN 500 MG
1000 TABLET ORAL EVERY 6 HOURS
COMMUNITY
Start: 2021-03-24 | End: 2021-07-09

## 2021-03-24 RX ADMIN — ACETAMINOPHEN 650 MG: 500 TABLET, FILM COATED ORAL at 11:06

## 2021-03-24 RX ADMIN — ACETAMINOPHEN 1000 MG: 500 TABLET, FILM COATED ORAL at 00:10

## 2021-03-24 RX ADMIN — ACETAMINOPHEN 1000 MG: 500 TABLET, FILM COATED ORAL at 05:12

## 2021-03-24 RX ADMIN — IBUPROFEN 600 MG: 600 TABLET, FILM COATED ORAL at 09:12

## 2021-03-24 RX ADMIN — ACETAMINOPHEN 1000 MG: 500 TABLET, FILM COATED ORAL at 11:12

## 2021-03-24 RX ADMIN — LEVOTHYROXINE SODIUM 150 MCG: 150 TABLET ORAL at 08:15

## 2021-03-24 RX ADMIN — ENOXAPARIN SODIUM 40 MG: 40 INJECTION SUBCUTANEOUS at 08:32

## 2021-03-24 RX ADMIN — DOCUSATE SODIUM AND SENNOSIDES 1 TABLET: 8.6; 5 TABLET, FILM COATED ORAL at 08:15

## 2021-03-24 RX ADMIN — PREDNISONE 5 MG: 5 TABLET ORAL at 08:15

## 2021-03-24 RX ADMIN — POLYETHYLENE GLYCOL 3350 17 G: 17 POWDER, FOR SOLUTION ORAL at 08:15

## 2021-03-24 RX ADMIN — OXYCODONE HYDROCHLORIDE 5 MG: 5 TABLET ORAL at 10:27

## 2021-03-24 RX ADMIN — FAMOTIDINE 20 MG: 20 TABLET ORAL at 08:15

## 2021-03-24 ASSESSMENT — ACTIVITIES OF DAILY LIVING (ADL)
ADLS_ACUITY_SCORE: 16

## 2021-03-24 NOTE — DISCHARGE SUMMARY
[facility] Discharge Summary    Leonidas Pratt MRN: [id number]   YOB: 1965 Age: 56 year old     Date of Admission:  3/22/2021  Date of Discharge::  03/24/21  Admitting Physician:  Benjy Hull MD  Discharge Physician:  Dr. 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  He had only occular symptoms for 10 years, and over the last 6 months he started developing generalized symptoms (extremity weakness, difficulty swallowing, and severe ptosis). He is currently on prednisone to 5 every day and has not required IVIG or cellcept.           Hospital Course:   The patient underwent listed procedure(s) above, which he tolerated without complications. Overall unremarkable hospitalization.  At the time of discharge, he was tolerating PO intake, ambulating, voiding spontaneously without difficulty, and pain was controlled with oral pain medications. The patient was discharged home in stable and improved condition with follow up with neurology and thoracic surgery.         Final Pathology Result:   Pending at time of discharge         Pertinent Imaging Results:   CT chest 10/2020  IMPRESSION:   1. Unchanged tiny soft tissue density in the anterior superior  mediastinum, presumably residual thymus. No enlargement to suggest  thymoma.  2. Unchanged few scattered subcentimeter hepatic hypodensities,  favored cysts.         Medications Prior to Admission:     Medications Prior to Admission   Medication Sig Dispense Refill Last Dose     levothyroxine (SYNTHROID/LEVOTHROID) 150 MCG tablet Take 1 tablet (150 mcg) by mouth daily (Patient taking differently: Take 150  mcg by mouth every morning ) 90 tablet 3 3/22/2021 at 0500     Vitamin D, Cholecalciferol, 25 MCG (1000 UT) TABS Take by mouth every morning   3/21/2021 at Unknown time     albuterol (PROAIR HFA/PROVENTIL HFA/VENTOLIN HFA) 108 (90 BASE) MCG/ACT Inhaler Inhale 2 puffs into the lungs every 6 hours as needed for shortness of breath / dyspnea or wheezing 1 Inhaler 0 Unknown at Unknown time     [DISCONTINUED] predniSONE (DELTASONE) 5 MG tablet Take 4 tablets (20 mg) by mouth daily (Patient taking differently: Take 20 mg by mouth every morning ) 120 tablet 11 3/22/2021 at 0600            Discharge Medications:     Current Discharge Medication List      START taking these medications    Details   acetaminophen (TYLENOL) 500 MG tablet Take 2 tablets (1,000 mg) by mouth every 6 hours  Qty:      Associated Diagnoses: Myasthenia gravis with exacerbation (H)      ibuprofen (ADVIL/MOTRIN) 600 MG tablet Take 1 tablet (600 mg) by mouth every 6 hours as needed for moderate pain  Qty:      Associated Diagnoses: Myasthenia gravis with exacerbation (H)      oxyCODONE (ROXICODONE) 5 MG tablet Take 1 tablet (5 mg) by mouth every 3 hours as needed for moderate to severe pain  Qty: 50 tablet, Refills: 0    Associated Diagnoses: Myasthenia gravis with exacerbation (H)      polyethylene glycol (MIRALAX) 17 g packet Take 17 g by mouth daily  Qty: 7 packet, Refills: 0    Associated Diagnoses: Myasthenia gravis with exacerbation (H)      senna-docusate (SENOKOT-S/PERICOLACE) 8.6-50 MG tablet Take 1 tablet by mouth 2 times daily  Qty: 14 tablet, Refills: 0    Associated Diagnoses: Myasthenia gravis with exacerbation (H)         CONTINUE these medications which have CHANGED    Details   predniSONE (DELTASONE) 5 MG tablet Take 1 tablet (5 mg) by mouth daily  Qty: 120 tablet, Refills: 11    Associated Diagnoses: Ocular myasthenia (H)         CONTINUE these medications which have NOT CHANGED    Details   levothyroxine (SYNTHROID/LEVOTHROID) 150 MCG  tablet Take 1 tablet (150 mcg) by mouth daily  Qty: 90 tablet, Refills: 3    Associated Diagnoses: Hypothyroidism, unspecified type      Vitamin D, Cholecalciferol, 25 MCG (1000 UT) TABS Take by mouth every morning      albuterol (PROAIR HFA/PROVENTIL HFA/VENTOLIN HFA) 108 (90 BASE) MCG/ACT Inhaler Inhale 2 puffs into the lungs every 6 hours as needed for shortness of breath / dyspnea or wheezing  Qty: 1 Inhaler, Refills: 0    Associated Diagnoses: Acute bronchitis, unspecified organism                  Day of Discharge Physical Exam:   Temp:  [98.2  F (36.8  C)-98.3  F (36.8  C)] 98.2  F (36.8  C)  Pulse:  [78-88] 87  Resp:  [18] 18  BP: (102-135)/(66-81) 135/81  SpO2:  [90 %-94 %] 94 %  General: A&O, NAD, lying comfortably in bed  Chest: NLB on RA  Abd: Soft, ND, NT  Extremities: WWP  Neuro: Moving all extremities spontaneously without apparent deficit         Discharge Instructions and Follow-Up:        X-ray Chest 2 vws*     Reason for your hospital stay    Surgery     Adult [facility] Follow-up and recommended labs and tests    1.) Follow up with primary care physician, Moreno Marion, in 1-2 weeks.  2.) Follow up with a thoracic surgery Clinical Nurse Specialist in Thoracic Surgery clinic in 1 month, prior to which a CXR should be performed.  3.) Follow up with Neurology as previously scheduled.     Appointments on [place] and/or [facility] (with [facility] or [facility] provider or service). Call [phone] if you haven't heard regarding these appointments within 7 days of discharge.     Discharge Instructions    THORACIC SURGERY DISCHARGE INSTRUCTIONS    DIET: Regular diet - as prior to admission     If your plans upon discharge include prolonged periods of sitting (i.e a lengthy car or plane ride), it is highly beneficial to get up and walk at least once per hour to help prevent swelling and blood clots.     You may remove chest tube dressing 48 hours after tube removal and bandage the site at your own  "discretion thereafter.  Small amounts of leakage are normal for 2-3 days after removal.  Feel free to call with questions.    You may get incision wet 2 days after operation. Do not submerge, soak, or scrub incision or swim until seen in follow-up.    Take incentive spirometer home for continued frequent use    Activity as tolerated, no strenous activity until seen in follow-up, no lifting greater than 20 pounds for the next 2 weeks.    Stay hydrated. Take over the counter fiber (metamucil or benefiber) and stool softeners (Miralax, docusate or senna) if becoming constipated.     Call for fever greater than 101.5, chills, increased size of incision, red skin around incision, vision changes, muscle strength changes, sensation changes, shortness of breath, or other concerns.    No driving while taking narcotic pain medication.    Transition to ibuprofen or tylenol/acetaminophen for pain control. Do not take tylenol/acetaminophen and acetaminophen containing narcotic (e.g., percocet or vicodin) at the same time. If you have known ulcer problems, or kidney trouble (elevated creatinine) do not take the ibuprofen.    In emergencies, call [phone]    For other Questions or Concerns;   A.) During weekday working hours (Monday through Friday 8am to 4:30pm)   call [phone] and ask to speak to a clinical nurse specialist.     B.) At nights (after 4:30pm), on weekends, or if urgent call [phone] and   tell the  \"I would like to page job code [phone], the thoracic surgery   fellow on call, please.\"       Condition at discharge: Stable    Moreno Parihs PA-C  Thoracic and Foregut Surgery        "

## 2021-03-26 NOTE — PROGRESS NOTES
Attempted to contact the patient x3 for post-hospital call without answer. Will close encounter at this time.    Neha Reed CMA, [facility]  Post Hospital Discharge Team

## 2021-03-30 LAB — COPATH REPORT: NORMAL

## 2021-03-31 ENCOUNTER — OFFICE VISIT (OUTPATIENT)
Dept: NEUROLOGY | Facility: CLINIC | Age: 56
End: 2021-03-31
Payer: COMMERCIAL

## 2021-03-31 VITALS
BODY MASS INDEX: 27.52 KG/M2 | HEART RATE: 85 BPM | OXYGEN SATURATION: 97 % | SYSTOLIC BLOOD PRESSURE: 124 MMHG | WEIGHT: 181 LBS | DIASTOLIC BLOOD PRESSURE: 80 MMHG

## 2021-03-31 DIAGNOSIS — G70.00 MYASTHENIA GRAVIS WITHOUT EXACERBATION (H): Primary | ICD-10-CM

## 2021-03-31 PROCEDURE — 99214 OFFICE O/P EST MOD 30 MIN: CPT | Mod: GC | Performed by: PSYCHIATRY & NEUROLOGY

## 2021-03-31 RX ORDER — POLYETHYLENE GLYCOL 3350 17 G/17G
17 POWDER, FOR SOLUTION ORAL DAILY PRN
COMMUNITY
Start: 2021-03-24 | End: 2021-03-31

## 2021-03-31 ASSESSMENT — PAIN SCALES - GENERAL: PAINLEVEL: NO PAIN (0)

## 2021-03-31 NOTE — LETTER
3/31/2021       RE: Leonidas Pratt  [address]  [address]  [place] [address]     Dear Colleague,    Thank you for referring your patient, Leonidas Pratt, to the [facility] [place] at [facility]. Please see a copy of my visit note below.    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 Onset was around 2010. First symptom was diplopia. About 3 years later ptosis started. No dysarthria, dysphagia, SOB, or weakness. In 2015 he was found to have ACHR ab. In 2015 he was diagnosed with MG and started on prednisone. He started prednisone 60 mg and tapered off over about 3 months. He was off immunotherapy in 2016, and remained off until 12/2018. At that end of 2018 he developed diplopia and restarted prednisone 60 mg daily. He tapered over a few months. By 2/20 he was taking prednisone 5 mg daily. This time prednisone was helpful to alleviate ptosis and diplopia. He remained on low dose prednisone (around 5 mg) through most of 2019. He was stable during that time. In April 2020 he then developed worsening ptosis and diplopia of both eyes. In 4/20 he increased prednisone to 40 mg daily. By June 2020 he was back down to 5 mg. In 9/20 he then experienced new weakness in his left hand. Typing was difficult. He also felt that his legs were weaker. There was a clear drop off in his typical level of activity and exercise. No dysarthria or dysphagia. In 10/20 prednisone increased to 40 mg daily. Prednisone tapered to 10mg daily in 01/21, and 5 mg by 3/21. He underwent thymectomy on 3/22/21.     Interval history:   He was last saw seen in clinic on 1/20/21. He underwent successful thymectomy 3/22/21. Prior to surgery he received a prophylatic course of IVIG 2 g/kg. Prednisone dose at the time of surgery was 5mg daily. He did well with the  surgery. No worsening of MG symptoms. No relapses. Presently he has no difficulty chewing or swallowing. No dysarthria. He denied any diplopia but continues to have mild ptosis which is stable.  He does note some difficulty with shortness of breath after surgery. Post operatively he was noted to have bilateral pneumothoraces. He feels it is difficult to breath in and describes orthopnea.  Overall he feels the shortness of breath is stable since surgery.      Prior pertinent laboratory work-up:  : Normal/negative Hba1c, TSH  4/15: ACHR ab binding 3.1 (N<0.4)    Prior electrophysiologic work-up:  12/10: RNS of the right facial nerves showed no abnormal decreament or increment.Single fiber EMG of the right frontalis muscle was subjectively well within the normal range (normal <1.69).  All individual pairs demonstrated normal jitter ranging from 13 ms to 47.2 ms with normal <53.5 ms in the frontalis.  The mean jitter of the 20 pairs in the right frontalis muscle was 22.9 ms (nl< 35.5ms). There was no transmission blocking. There is no electrophysiological evidence for neuromuscular junction disorder.  10/14/20: NCS/RNSshowed unequivocal decrement in facial-nasalis and ulnar-ADM.     Prior imagin/9/17 CT C/A/P showed no mediastinal mass  10/29/20: CT chest showed unchanged tiny soft tissue density in the anterior superior mediastinum, presumably residual thymus. No enlargement to suggest Thymoma.    Past Medical History:   Past Medical History:   Diagnosis Date     Asthma      Kidney stone      Myasthenia gravis (H)      Strabismus      Past Surgical History:  Past Surgical History:   Procedure Laterality Date     HERNIA REPAIR       LASER HOLMIUM LITHOTRIPSY URETER(S), INSERT STENT, COMBINED Right 10/15/2014    Procedure: COMBINED CYSTOSCOPY, URETEROSCOPY, LASER HOLMIUM LITHOTRIPSY URETER(S), INSERT STENT;  Surgeon: Thong Bates MD;  Location: [facility] OR     RECESSION RESECTION WITH ADJUSTABLE SUTURE   9/4/2012    LLRc 5.0mm on adj. LIRc 4.5mm on adj.     RECESSION RESECTION WITH ADJUSTABLE SUTURE BILATERAL  12/18/2012    RSRc 3.0mm; adj. suture     STRABISMUS SURGERY       THORACOSCOPIC THYMECTOMY N/A 3/22/2021    Procedure: SUBXIPHOID BILATERAL THORACOSCOPIC THYMECTOMY;  Surgeon: Benjy Hull MD;  Location:  OR     Family history:    There is no known family history of myopathy or other neuromuscular disorders. He does have a sister with spasmodic dysphonia.     Social History:    He denies tobacco, alcohol, or illicit drug use.     Medical Allergies:    Allergies   Allergen Reactions     Nkda [No Known Drug Allergies]      Current Medications:    Current Outpatient Medications   Medication     acetaminophen (TYLENOL) 500 MG tablet     albuterol (PROAIR HFA/PROVENTIL HFA/VENTOLIN HFA) 108 (90 BASE) MCG/ACT Inhaler     ibuprofen (ADVIL/MOTRIN) 600 MG tablet     levothyroxine (SYNTHROID/LEVOTHROID) 150 MCG tablet     oxyCODONE (ROXICODONE) 5 MG tablet     polyethylene glycol (MIRALAX) 17 g packet     predniSONE (DELTASONE) 5 MG tablet     senna-docusate (SENOKOT-S/PERICOLACE) 8.6-50 MG tablet     Vitamin D, Cholecalciferol, 25 MCG (1000 UT) TABS     No current facility-administered medications for this visit.      Review of Systems: A review of systems was obtained and was negative except for what was noted above.    Physical examination:    /80   Pulse 85   Wt 82.1 kg (181 lb)   SpO2 97%   BMI 27.52 kg/m       General Appearance: NAD    Skin: There are no rashes or other skin lesions.     Neurologic examination:    Mental status:  Patient is alert, attentive, and oriented x 3.  Language is coherent and fluent without aphasia.  Memory, comprehension and ability to follow commands were intact.       Cranial nerves:  No ptosis at baseline. No diplopia at mid position. With upgaze ptosis emerges after about 10 seconds. After about 5 seconds of lateral gaze he develops diplopia. Symmetric  smile. Eye and lip closure strong. No dysarthria.      Motor exam: No atrophy or fasciculations. Manual muscle testing revealed the following MRC grade muscle power:   Right Left   Neck flexion 5 5   Neck extension: 5 5   Shoulder ext rotation 5 5   Shoulder abduction:  5 5   Elbow extension: 5 5   Elbow flexion:  5 5   Wrist flexion:  5 5   Wrist extension:  5 5   Finger flexion 5 5   Finger extension 5 5   FDI 5 5   Hip flexion 5 5   Knee flexion 5 5   Knee extension 5 5   Dorsiflexion 5 5   Plantar flexion 5 5     Complex motor skills: No tremor or ataxia     Sensory exam: Normal sensation in hands and feet    Gait: Narrow and stable. No difficulty rising from a sitting position unassisted.     Deep tendon reflexes:   Right Left   Triceps 2 2   Biceps 2 2   Brachioradialis 2 2   Knee jerk 2 2   Ankle jerk 2 2        MG Outcomes MG-ADL QMG MG-Composite  strength (kg) MG medications   10/12/20 7 Not tested 9 Right 35, Left 31 Pred 5 mg daily   11/18/20 5 Not tested 6 Right 40, Left 39 Pred 25 mg daily   1/20/21 4 Not tested 2 Right 41, Left 41 Prednisone 10mg daily   3/31/21 5 Not tested 9 Right 44, Left 46 Thymectomy 3/22/21, IVIG prior to thymectomy 2g/kg over 3 days (03/15-17/2021),  Prednisone 5mg daily     Assessment:    Leonidas Pratt is a 56 year old man with ACHR ab positive generalized non-thymomatous myasthenia gravis s/p successful thymectomy 3/22/21. Presently he is doing well with no worsening in the post operative period and now with minimal manifestations on only low dose prednisone. We will plan to continue prednisone 5mg daily given his recent thymectomy, and anticipate resuming very slow wean later this year. At this time there is no indication for a steroid sparing medication or IVIG. He knows to call us ASAP if any new symptoms develop.     Plan:      1. Immunotherapy:   - Prednisone: Continue 5 mg daily.  If stable at next visit anticipate slow taper in summer/fall 2021. If MG worsens will  increase to the lowest effective dose.   - No indication for steroid sparing agent at this time  - No role for IVIG at this time  2. Thymectomy: Completed 3/22/21. Discussed benefits from thymectomy, which may take 1-3 years to be fully appreciated. Appreciate collateral care with Dr. Hull. He will follow up with Dr. Hull next month.   3. Supportive care:  - Mestinon: No indication at this time. Not helpful in the past.  4. Potential MG triggers including heat, surgery, and illness. Certain medications and over the counter preparations may also cause worsening of MG symptoms. A list of cautionary medications can be found at: [email or web address]-[email or web address]-Drugs  5. Follow up 3 months. Sooner if needed.      Juan Gerard MD  Neuromuscular Fellow  -----  ADDENDUM:   I personally interviewed and examined the patient. I agree with the history, physical, assessment, and plan as documented above.     Rj Jackson MD

## 2021-03-31 NOTE — PROGRESS NOTES
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 Onset was around 2010. First symptom was diplopia. About 3 years later ptosis started. No dysarthria, dysphagia, SOB, or weakness. In 2015 he was found to have ACHR ab. In 2015 he was diagnosed with MG and started on prednisone. He started prednisone 60 mg and tapered off over about 3 months. He was off immunotherapy in 2016, and remained off until 12/2018. At that end of 2018 he developed diplopia and restarted prednisone 60 mg daily. He tapered over a few months. By 2/20 he was taking prednisone 5 mg daily. This time prednisone was helpful to alleviate ptosis and diplopia. He remained on low dose prednisone (around 5 mg) through most of 2019. He was stable during that time. In April 2020 he then developed worsening ptosis and diplopia of both eyes. In 4/20 he increased prednisone to 40 mg daily. By June 2020 he was back down to 5 mg. In 9/20 he then experienced new weakness in his left hand. Typing was difficult. He also felt that his legs were weaker. There was a clear drop off in his typical level of activity and exercise. No dysarthria or dysphagia. In 10/20 prednisone increased to 40 mg daily. Prednisone tapered to 10mg daily in 01/21, and 5 mg by 3/21. He underwent thymectomy on 3/22/21.     Interval history:   He was last saw seen in clinic on 1/20/21. He underwent successful thymectomy 3/22/21. Prior to surgery he received a prophylatic course of IVIG 2 g/kg. Prednisone dose at the time of surgery was 5mg daily. He did well with the surgery. No worsening of MG symptoms. No relapses. Presently he has no difficulty chewing or swallowing. No dysarthria. He denied any diplopia but continues to have mild ptosis which is stable.  He does note some difficulty with shortness of breath after surgery. Post operatively he was noted to have bilateral pneumothoraces. He  feels it is difficult to breath in and describes orthopnea.  Overall he feels the shortness of breath is stable since surgery.      Prior pertinent laboratory work-up:  : Normal/negative Hba1c, TSH  4/15: ACHR ab binding 3.1 (N<0.4)    Prior electrophysiologic work-up:  12/10: RNS of the right facial nerves showed no abnormal decreament or increment.Single fiber EMG of the right frontalis muscle was subjectively well within the normal range (normal <1.69).  All individual pairs demonstrated normal jitter ranging from 13 ms to 47.2 ms with normal <53.5 ms in the frontalis.  The mean jitter of the 20 pairs in the right frontalis muscle was 22.9 ms (nl< 35.5ms). There was no transmission blocking. There is no electrophysiological evidence for neuromuscular junction disorder.  10/14/20: NCS/RNSshowed unequivocal decrement in facial-nasalis and ulnar-ADM.     Prior imagin/9/17 CT C/A/P showed no mediastinal mass  10/29/20: CT chest showed unchanged tiny soft tissue density in the anterior superior mediastinum, presumably residual thymus. No enlargement to suggest Thymoma.    Past Medical History:   Past Medical History:   Diagnosis Date     Asthma      Kidney stone      Myasthenia gravis (H)      Strabismus      Past Surgical History:  Past Surgical History:   Procedure Laterality Date     HERNIA REPAIR       LASER HOLMIUM LITHOTRIPSY URETER(S), INSERT STENT, COMBINED Right 10/15/2014    Procedure: COMBINED CYSTOSCOPY, URETEROSCOPY, LASER HOLMIUM LITHOTRIPSY URETER(S), INSERT STENT;  Surgeon: Thong Bates MD;  [facility]     RECESSION RESECTION WITH ADJUSTABLE SUTURE  2012    LLRc 5.0mm on adj. LIRc 4.5mm on adj.     RECESSION RESECTION WITH ADJUSTABLE SUTURE BILATERAL  2012    RSRc 3.0mm; adj. suture     STRABISMUS SURGERY       THORACOSCOPIC THYMECTOMY N/A 3/22/2021    Procedure: SUBXIPHOID BILATERAL THORACOSCOPIC THYMECTOMY;  Surgeon: Benjy Hull MD;  Location: [facility] OR      Family history:    There is no known family history of myopathy or other neuromuscular disorders. He does have a sister with spasmodic dysphonia.     Social History:    He denies tobacco, alcohol, or illicit drug use.     Medical Allergies:    Allergies   Allergen Reactions     Nkda [No Known Drug Allergies]      Current Medications:    Current Outpatient Medications   Medication     acetaminophen (TYLENOL) 500 MG tablet     albuterol (PROAIR HFA/PROVENTIL HFA/VENTOLIN HFA) 108 (90 BASE) MCG/ACT Inhaler     ibuprofen (ADVIL/MOTRIN) 600 MG tablet     levothyroxine (SYNTHROID/LEVOTHROID) 150 MCG tablet     oxyCODONE (ROXICODONE) 5 MG tablet     polyethylene glycol (MIRALAX) 17 g packet     predniSONE (DELTASONE) 5 MG tablet     senna-docusate (SENOKOT-S/PERICOLACE) 8.6-50 MG tablet     Vitamin D, Cholecalciferol, 25 MCG (1000 UT) TABS     No current facility-administered medications for this visit.      Review of Systems: A review of systems was obtained and was negative except for what was noted above.    Physical examination:    /80   Pulse 85   Wt 82.1 kg (181 lb)   SpO2 97%   BMI 27.52 kg/m       General Appearance: NAD    Skin: There are no rashes or other skin lesions.     Neurologic examination:    Mental status:  Patient is alert, attentive, and oriented x 3.  Language is coherent and fluent without aphasia.  Memory, comprehension and ability to follow commands were intact.       Cranial nerves:  No ptosis at baseline. No diplopia at mid position. With upgaze ptosis emerges after about 10 seconds. After about 5 seconds of lateral gaze he develops diplopia. Symmetric smile. Eye and lip closure strong. No dysarthria.      Motor exam: No atrophy or fasciculations. Manual muscle testing revealed the following MRC grade muscle power:   Right Left   Neck flexion 5 5   Neck extension: 5 5   Shoulder ext rotation 5 5   Shoulder abduction:  5 5   Elbow extension: 5 5   Elbow flexion:  5 5   Wrist  flexion:  5 5   Wrist extension:  5 5   Finger flexion 5 5   Finger extension 5 5   FDI 5 5   Hip flexion 5 5   Knee flexion 5 5   Knee extension 5 5   Dorsiflexion 5 5   Plantar flexion 5 5     Complex motor skills: No tremor or ataxia     Sensory exam: Normal sensation in hands and feet    Gait: Narrow and stable. No difficulty rising from a sitting position unassisted.     Deep tendon reflexes:   Right Left   Triceps 2 2   Biceps 2 2   Brachioradialis 2 2   Knee jerk 2 2   Ankle jerk 2 2        MG Outcomes MG-ADL QMG MG-Composite  strength (kg) MG medications   10/12/20 7 Not tested 9 Right 35, Left 31 Pred 5 mg daily   11/18/20 5 Not tested 6 Right 40, Left 39 Pred 25 mg daily   1/20/21 4 Not tested 2 Right 41, Left 41 Prednisone 10mg daily   3/31/21 5 Not tested 9 Right 44, Left 46 Thymectomy 3/22/21, IVIG prior to thymectomy 2g/kg over 3 days (03/15-17/2021),  Prednisone 5mg daily     Assessment:    Leonidas Pratt is a 56 year old man with ACHR ab positive generalized non-thymomatous myasthenia gravis s/p successful thymectomy 3/22/21. Presently he is doing well with no worsening in the post operative period and now with minimal manifestations on only low dose prednisone. We will plan to continue prednisone 5mg daily given his recent thymectomy, and anticipate resuming very slow wean later this year. At this time there is no indication for a steroid sparing medication or IVIG. He knows to call us ASAP if any new symptoms develop.     Plan:      1. Immunotherapy:   - Prednisone: Continue 5 mg daily.  If stable at next visit anticipate slow taper in summer/fall 2021. If MG worsens will increase to the lowest effective dose.   - No indication for steroid sparing agent at this time  - No role for IVIG at this time  2. Thymectomy: Completed 3/22/21. Discussed benefits from thymectomy, which may take 1-3 years to be fully appreciated. Appreciate collateral care with Dr. Hull. He will follow up with   Kurtis next month.   3. Supportive care:  - Mestinon: No indication at this time. Not helpful in the past.  4. Potential MG triggers including heat, surgery, and illness. Certain medications and over the counter preparations may also cause worsening of MG symptoms. A list of cautionary medications can be found at: [email or web address]-MG/MG-[email or web address]-Drugs  5. Follow up 3 months. Sooner if needed.      Juan Gerard MD  Neuromuscular Fellow  -----  ADDENDUM:   I personally interviewed and examined the patient. I agree with the history, physical, assessment, and plan as documented above.     Rj Jackson MD

## 2021-03-31 NOTE — NURSING NOTE
Chief Complaint   Patient presents with     RECHECK     [facility] - MYESTHENIA GRAVIS 2 MO F/U      Leigh Ann Steven, EMT

## 2021-04-13 ENCOUNTER — HOSPITAL ENCOUNTER (EMERGENCY)
Facility: CLINIC | Age: 56
Discharge: HOME | End: 2021-04-13
Attending: EMERGENCY MEDICINE | Admitting: EMERGENCY MEDICINE
Payer: COMMERCIAL

## 2021-04-13 ENCOUNTER — APPOINTMENT (OUTPATIENT)
Dept: CT IMAGING | Facility: CLINIC | Age: 56
End: 2021-04-13
Attending: EMERGENCY MEDICINE
Payer: COMMERCIAL

## 2021-04-13 VITALS
TEMPERATURE: 98.2 F | OXYGEN SATURATION: 96 % | BODY MASS INDEX: 27.37 KG/M2 | WEIGHT: 180 LBS | DIASTOLIC BLOOD PRESSURE: 96 MMHG | HEART RATE: 73 BPM | RESPIRATION RATE: 21 BRPM | SYSTOLIC BLOOD PRESSURE: 126 MMHG

## 2021-04-13 DIAGNOSIS — Z20.822 COVID-19 RULED OUT BY LABORATORY TESTING: ICD-10-CM

## 2021-04-13 DIAGNOSIS — R00.2 PALPITATIONS: ICD-10-CM

## 2021-04-13 DIAGNOSIS — R06.02 SHORTNESS OF BREATH: ICD-10-CM

## 2021-04-13 LAB
ALBUMIN SERPL-MCNC: 3.3 G/DL (ref 3.4–5)
ALP SERPL-CCNC: 131 U/L (ref 40–150)
ALT SERPL W P-5'-P-CCNC: 43 U/L (ref 0–70)
ANION GAP SERPL CALCULATED.3IONS-SCNC: 4 MMOL/L (ref 3–14)
AST SERPL W P-5'-P-CCNC: 24 U/L (ref 0–45)
BASOPHILS # BLD AUTO: 0.1 10E9/L (ref 0–0.2)
BASOPHILS NFR BLD AUTO: 1.1 %
BILIRUB SERPL-MCNC: 0.2 MG/DL (ref 0.2–1.3)
BUN SERPL-MCNC: 16 MG/DL (ref 7–30)
CALCIUM SERPL-MCNC: 8.3 MG/DL (ref 8.5–10.1)
CHLORIDE SERPL-SCNC: 107 MMOL/L (ref 94–109)
CO2 SERPL-SCNC: 27 MMOL/L (ref 20–32)
CREAT SERPL-MCNC: 0.92 MG/DL (ref 0.66–1.25)
DIFFERENTIAL METHOD BLD: ABNORMAL
EOSINOPHIL # BLD AUTO: 0.3 10E9/L (ref 0–0.7)
EOSINOPHIL NFR BLD AUTO: 4.1 %
ERYTHROCYTE [DISTWIDTH] IN BLOOD BY AUTOMATED COUNT: 12.4 % (ref 10–15)
GFR SERPL CREATININE-BSD FRML MDRD: >90 ML/MIN/{1.73_M2}
GLUCOSE SERPL-MCNC: 86 MG/DL (ref 70–99)
HCT VFR BLD AUTO: 39.4 % (ref 40–53)
HGB BLD-MCNC: 13.2 G/DL (ref 13.3–17.7)
IMM GRANULOCYTES # BLD: 0 10E9/L (ref 0–0.4)
IMM GRANULOCYTES NFR BLD: 0.4 %
INTERPRETATION ECG - MUSE: NORMAL
LABORATORY COMMENT REPORT: NORMAL
LYMPHOCYTES # BLD AUTO: 2.8 10E9/L (ref 0.8–5.3)
LYMPHOCYTES NFR BLD AUTO: 35.2 %
MAGNESIUM SERPL-MCNC: 2.2 MG/DL (ref 1.6–2.3)
MCH RBC QN AUTO: 31.2 PG (ref 26.5–33)
MCHC RBC AUTO-ENTMCNC: 33.5 G/DL (ref 31.5–36.5)
MCV RBC AUTO: 93 FL (ref 78–100)
MONOCYTES # BLD AUTO: 1 10E9/L (ref 0–1.3)
MONOCYTES NFR BLD AUTO: 12.2 %
NEUTROPHILS # BLD AUTO: 3.8 10E9/L (ref 1.6–8.3)
NEUTROPHILS NFR BLD AUTO: 47 %
NRBC # BLD AUTO: 0 10*3/UL
NRBC BLD AUTO-RTO: 0 /100
NT-PROBNP SERPL-MCNC: 133 PG/ML (ref 0–900)
PHOSPHATE SERPL-MCNC: 3.5 MG/DL (ref 2.5–4.5)
PLATELET # BLD AUTO: 383 10E9/L (ref 150–450)
POTASSIUM SERPL-SCNC: 4.4 MMOL/L (ref 3.4–5.3)
PROT SERPL-MCNC: 7.6 G/DL (ref 6.8–8.8)
RBC # BLD AUTO: 4.23 10E12/L (ref 4.4–5.9)
SARS-COV-2 RNA RESP QL NAA+PROBE: NEGATIVE
SODIUM SERPL-SCNC: 138 MMOL/L (ref 133–144)
SPECIMEN SOURCE: NORMAL
TROPONIN I SERPL-MCNC: <0.015 UG/L (ref 0–0.04)
TSH SERPL DL<=0.005 MIU/L-ACNC: 3.45 MU/L (ref 0.4–4)
WBC # BLD AUTO: 8.1 10E9/L (ref 4–11)

## 2021-04-13 PROCEDURE — C9803 HOPD COVID-19 SPEC COLLECT: HCPCS | Performed by: EMERGENCY MEDICINE

## 2021-04-13 PROCEDURE — 250N000011 HC RX IP 250 OP 636: Performed by: EMERGENCY MEDICINE

## 2021-04-13 PROCEDURE — 93010 ELECTROCARDIOGRAM REPORT: CPT | Mod: 59 | Performed by: EMERGENCY MEDICINE

## 2021-04-13 PROCEDURE — 99285 EMERGENCY DEPT VISIT HI MDM: CPT | Mod: 25 | Performed by: EMERGENCY MEDICINE

## 2021-04-13 PROCEDURE — 84484 ASSAY OF TROPONIN QUANT: CPT | Performed by: EMERGENCY MEDICINE

## 2021-04-13 PROCEDURE — 93308 TTE F-UP OR LMTD: CPT | Performed by: EMERGENCY MEDICINE

## 2021-04-13 PROCEDURE — 93005 ELECTROCARDIOGRAM TRACING: CPT | Performed by: EMERGENCY MEDICINE

## 2021-04-13 PROCEDURE — 85025 COMPLETE CBC W/AUTO DIFF WBC: CPT | Performed by: EMERGENCY MEDICINE

## 2021-04-13 PROCEDURE — 87635 SARS-COV-2 COVID-19 AMP PRB: CPT | Performed by: EMERGENCY MEDICINE

## 2021-04-13 PROCEDURE — 250N000009 HC RX 250: Performed by: EMERGENCY MEDICINE

## 2021-04-13 PROCEDURE — 80053 COMPREHEN METABOLIC PANEL: CPT | Performed by: EMERGENCY MEDICINE

## 2021-04-13 PROCEDURE — 83735 ASSAY OF MAGNESIUM: CPT | Performed by: EMERGENCY MEDICINE

## 2021-04-13 PROCEDURE — 71275 CT ANGIOGRAPHY CHEST: CPT

## 2021-04-13 PROCEDURE — 84443 ASSAY THYROID STIM HORMONE: CPT | Performed by: EMERGENCY MEDICINE

## 2021-04-13 PROCEDURE — 93308 TTE F-UP OR LMTD: CPT | Mod: 26 | Performed by: EMERGENCY MEDICINE

## 2021-04-13 PROCEDURE — 84100 ASSAY OF PHOSPHORUS: CPT | Performed by: EMERGENCY MEDICINE

## 2021-04-13 PROCEDURE — 83880 ASSAY OF NATRIURETIC PEPTIDE: CPT | Performed by: EMERGENCY MEDICINE

## 2021-04-13 RX ORDER — LEVOTHYROXINE SODIUM 150 UG/1
150 TABLET ORAL DAILY
COMMUNITY
End: 2022-01-12

## 2021-04-13 RX ORDER — IOPAMIDOL 755 MG/ML
100 INJECTION, SOLUTION INTRAVASCULAR ONCE
Status: COMPLETED | OUTPATIENT
Start: 2021-04-13 | End: 2021-04-13

## 2021-04-13 RX ADMIN — IOPAMIDOL 64 ML: 755 INJECTION, SOLUTION INTRAVENOUS at 03:13

## 2021-04-13 RX ADMIN — SODIUM CHLORIDE 84 ML: 9 INJECTION, SOLUTION INTRAVENOUS at 03:14

## 2021-04-13 NOTE — ED PROVIDER NOTES
[facility] [place] [facility] ([facility])    4/13/21        History     Chief Complaint   Patient presents with     Shortness of Breath     Pt c/o increasing SOB, worse tonight. Pt s/p thymus removal on 3/22. Pt concerned about pericarditis     The history is provided by the patient and medical records.     Leonidas Pratt is a 56 year old male with a history of myasthenia gravis s/p thymectomy (3/22/2021) who presents to the Emergency Department with shortness of breath. Patient reports he had his thymus removed about 3 weeks ago and was doing well after surgery. He states about 3 days ago he began having more shortness of breath. He states he has difficulty walking up a flight of stairs. He was able to exercise just a few days ago. He states tonight he had difficulty lying flat. He states when he lays on the bed he feels like his heart is going to pound out of his chest. Patient denies leg swelling, chest pain, fevers, or cough. Patient denies history of cardiac issues or heart rhythms problems. No history of DVT/PE. Patient is not on anticoagulants or diuretics. No COVID symptoms, states he is fully vaccinated. Patient denies alcohol or drug use. Patient is a sports medicine physician.    Past Medical History  Past Medical History:   Diagnosis Date     Asthma      Kidney stone      Myasthenia gravis (H)      Strabismus      Thyroid disease      Past Surgical History:   Procedure Laterality Date     HERNIA REPAIR       LASER HOLMIUM LITHOTRIPSY URETER(S), INSERT STENT, COMBINED Right 10/15/2014    Procedure: COMBINED CYSTOSCOPY, URETEROSCOPY, LASER HOLMIUM LITHOTRIPSY URETER(S), INSERT STENT;  Surgeon: Thong Bates MD;  [facility]     RECESSION RESECTION WITH ADJUSTABLE SUTURE  9/4/2012    LLRc 5.0mm on adj. LIRc 4.5mm on adj.     RECESSION RESECTION WITH ADJUSTABLE SUTURE BILATERAL  12/18/2012    RSRc 3.0mm; adj. suture     STRABISMUS SURGERY       THORACOSCOPIC THYMECTOMY N/A 3/22/2021     Procedure: SUBXIPHOID BILATERAL THORACOSCOPIC THYMECTOMY;  Surgeon: Benjy Hull MD;  Location: [facility] OR     acetaminophen (TYLENOL) 500 MG tablet  levothyroxine (SYNTHROID/LEVOTHROID) 150 MCG tablet  predniSONE (DELTASONE) 5 MG tablet  albuterol (PROAIR HFA/PROVENTIL HFA/VENTOLIN HFA) 108 (90 BASE) MCG/ACT Inhaler  ibuprofen (ADVIL/MOTRIN) 600 MG tablet  levothyroxine (SYNTHROID/LEVOTHROID) 150 MCG tablet  oxyCODONE (ROXICODONE) 5 MG tablet  polyethylene glycol (MIRALAX) 17 g packet  senna-docusate (SENOKOT-S/PERICOLACE) 8.6-50 MG tablet  Vitamin D, Cholecalciferol, 25 MCG (1000 UT) TABS      Allergies   Allergen Reactions     Nkda [No Known Drug Allergies]      Family History  Family History   Problem Relation Age of Onset     Thyroid Disease Sister      Social History   Social History     Tobacco Use     Smoking status: Never Smoker     Smokeless tobacco: Never Used   Substance Use Topics     Alcohol use: No     Drug use: No      Past medical history, past surgical history, medications, allergies, family history, and social history were reviewed with the patient. No additional pertinent items.       Review of Systems   ROS: 14 point ROS neg other than the symptoms noted above in the HPI.  A complete review of systems was performed with pertinent positives and negatives noted in the HPI, and all other systems negative.    Physical Exam   Temp: 98.2  F (36.8  C)  Resp: 20  Physical Exam  Physical Exam   Constitutional: oriented to person, place, and time. appears well-developed and well-nourished.   HENT:   Head: Normocephalic and atraumatic.   Neck: Normal range of motion.   Pulmonary/Chest: Effort normal. No respiratory distress.   Cardiac: No murmurs, rubs, gallops. RRR.  Abdominal: Abdomen soft, nontender, nondistended. No rebound tenderness. Well healing wounds on abd.  MSK: Long bones without deformity or evidence of trauma. No lower extremity swelling. No TTP over the calves.  Neurological:  alert and oriented to person, place, and time.   Skin: Skin is warm and dry.   Psychiatric:  normal mood and affect.  behavior is normal. Thought content normal.     ED Course   1:16 AM  The patient was seen and examined by Manuel Evans MD in Room ED07.      Procedures          EKG Interpretation:      Interpreted by Manuel Evans MD  Time reviewed: 0110  Symptoms at time of EKG: SOB   Rhythm: Sinus with PAC's present  Rate: 100-110  Axis: Normal  Ectopy: premature atrial contraction  Conduction: normal  ST Segments/ T Waves: No acute ischemic changes  Q Waves: nonspecific and lateral leads  Comparison to prior: No old EKG available    Clinical Impression: Lateral Q waves present.  No overt ST changes suggestive of ischemia or infarction.    Results for orders placed or performed during the hospital encounter of 04/13/21   POC US Echo Limited     Status: None    Impression    Limited Bedside Cardiac Ultrasound, performed and interpreted by me.   Indication: Shortness of Breath.  Parasternal long axis, parasternal short axis, apical 4 chamber and lung views were acquired.   Image quality was satisfactory.    Findings:    Global left ventricular function appears intact.  Chambers do not appear dilated.  There is small pericardial effusion vs fat pad.    IMPRESSION: Small pericardial effusion versus fat pad.  Ejection fraction appears to be good.  Small B-lines bilaterally in the lungs suggesting small amount of pulmonary edema.     CT Chest Pulmonary Embolism w Contrast     Status: None    Narrative    EXAM: CT CHEST PULMONARY EMBOLISM W CONTRAST  LOCATION: [facility]  DATE/TIME: 4/13/2021 2:59 AM    INDICATION: PE suspected, high prob. Shortness of breath. Recent thymectomy.  COMPARISON: 03/17/2021  TECHNIQUE: CT chest pulmonary angiogram during arterial phase injection of IV contrast. Multiplanar reformats and MIP reconstructions were performed. Dose reduction techniques were used.   CONTRAST:  64ml's     FINDINGS:  ANGIOGRAM CHEST: Pulmonary arteries are normal caliber and negative for pulmonary emboli. Thoracic aorta is not well opacified and is  indeterminate for dissection. No CT evidence of right heart strain.    LUNGS AND PLEURA: Mild basilar atelectasis. No infiltrate. Trace right pleural effusion.    MEDIASTINUM/AXILLAE: Small amount of fluid anterior mediastinum likely related to recent surgery. No adenopathy. Small pericardial effusion.    CORONARY ARTERY CALCIFICATION: None visualized.    UPPER ABDOMEN: Prominent vessels inferior to the sternum.    MUSCULOSKELETAL: Degenerative change osseous structures.      Impression    IMPRESSION:  1.  No pulmonary embolism.  2.  Small pericardial effusion.  3.  Recent postsurgical change anterior mediastinum.  4.  Trace right pleural effusion.   CBC with platelets differential     Status: Abnormal   Result Value Ref Range    WBC 8.1 4.0 - 11.0 10e9/L    RBC Count 4.23 (L) 4.4 - 5.9 10e12/L    Hemoglobin 13.2 (L) 13.3 - 17.7 g/dL    Hematocrit 39.4 (L) 40.0 - 53.0 %    MCV 93 78 - 100 fl    MCH 31.2 26.5 - 33.0 pg    MCHC 33.5 31.5 - 36.5 g/dL    RDW 12.4 10.0 - 15.0 %    Platelet Count 383 150 - 450 10e9/L    Diff Method Automated Method     % Neutrophils 47.0 %    % Lymphocytes 35.2 %    % Monocytes 12.2 %    % Eosinophils 4.1 %    % Basophils 1.1 %    % Immature Granulocytes 0.4 %    Nucleated RBCs 0 0 /100    Absolute Neutrophil 3.8 1.6 - 8.3 10e9/L    Absolute Lymphocytes 2.8 0.8 - 5.3 10e9/L    Absolute Monocytes 1.0 0.0 - 1.3 10e9/L    Absolute Eosinophils 0.3 0.0 - 0.7 10e9/L    Absolute Basophils 0.1 0.0 - 0.2 10e9/L    Abs Immature Granulocytes 0.0 0 - 0.4 10e9/L    Absolute Nucleated RBC 0.0    Comprehensive metabolic panel     Status: Abnormal   Result Value Ref Range    Sodium 138 133 - 144 mmol/L    Potassium 4.4 3.4 - 5.3 mmol/L    Chloride 107 94 - 109 mmol/L    Carbon Dioxide 27 20 - 32 mmol/L    Anion Gap 4 3 - 14 mmol/L    Glucose  86 70 - 99 mg/dL    Urea Nitrogen 16 7 - 30 mg/dL    Creatinine 0.92 0.66 - 1.25 mg/dL    GFR Estimate >90 >60 mL/min/[1.73_m2]    GFR Estimate If Black >90 >60 mL/min/[1.73_m2]    Calcium 8.3 (L) 8.5 - 10.1 mg/dL    Bilirubin Total 0.2 0.2 - 1.3 mg/dL    Albumin 3.3 (L) 3.4 - 5.0 g/dL    Protein Total 7.6 6.8 - 8.8 g/dL    Alkaline Phosphatase 131 40 - 150 U/L    ALT 43 0 - 70 U/L    AST 24 0 - 45 U/L   Troponin I     Status: None   Result Value Ref Range    Troponin I ES <0.015 0.000 - 0.045 ug/L   BNP     Status: None   Result Value Ref Range    N-Terminal Pro BNP Inpatient 133 0 - 900 pg/mL   TSH with free T4 reflex     Status: None   Result Value Ref Range    TSH 3.45 0.40 - 4.00 mU/L   Asymptomatic SARS-CoV-2 COVID-19 Virus (Coronavirus) by PCR     Status: None    Specimen: Nasopharyngeal   Result Value Ref Range    SARS-CoV-2 Virus Specimen Source Nasopharyngeal     SARS-CoV-2 PCR Result NEGATIVE     SARS-CoV-2 PCR Comment (Note)    EKG 12 lead     Status: None (Preliminary result)   Result Value Ref Range    Interpretation ECG Click View Image link to view waveform and result           Results for orders placed or performed during the hospital encounter of 04/13/21   EKG 12 lead     Status: None (Preliminary result)   Result Value Ref Range    Interpretation ECG Click View Image link to view waveform and result      Medications - No data to display     Assessments & Plan (with Medical Decision Making)   MDM  Patient is presenting with palpitations and some shortness of breath.  The patient is mostly concerned about pericarditis or pulmonary embolism.  Work-up included EKG which shows PACs, no atrial fibrillation or other tachydysrhythmia.  On the monitor he was in sinus most of his visit with occasional PACs that seem to be symptomatic.  He has no ST elevation or depression concerning for ischemia on the EKG.  Troponin is negative.  BNP is normal.  The CT shows a small pericardial effusion and small pleural  effusion which are not likely causing his symptoms after discussion with the cardiothoracic surgery staff.  He is not on oxygen and vitals are completely stable otherwise.  No white count or fever.  After discussion with cardiothoracic surgery, they do feel he is safe for discharge.  They recommend following up with primary care provider regarding the PACs.  The patient is not having symptoms of his myasthenia gravis crisis at this point so this is less likely crisis.  Cardiothoracic surgery will see him in a week.  There is no need for drainage of this pericardial fluid, the surgeon stated that this is most likely normal postop course.  The patient does not have symptoms or EKG findings suggestive of pericarditis, he has no pain or positional symptoms.  He is more concerned about palpitations.  He has had no syncope or presyncope type symptoms to suggest a tachydysrhythmia such as ventricular tachycardia.    I have reviewed the nursing notes. I have reviewed the findings, diagnosis, plan and need for follow up with the patient.    New Prescriptions    No medications on file       Final diagnoses:   Palpitations     IJoanie am serving as a trained medical scribe to document services personally performed by Manuel Evans MD, based on the provider's statements to me.      I,  Manuel Evans MD, was physically present and have reviewed and verified the accuracy of this note documented by Joanie Neumann.     --  Manuel Evans MD  [facility] EMERGENCY DEPARTMENT  4/13/2021     Manuel Evans MD  04/13/21 0424

## 2021-04-13 NOTE — DISCHARGE INSTRUCTIONS
Please make an appointment to follow up with Your Primary Care Provider in 2-3 days.    Return to the emergency department if you develop chest pain, fevers, chills, worsening shortness of breath or if you have any further concerns.    Follow-up with your cardiothoracic surgeon as planned.

## 2021-04-21 ENCOUNTER — OFFICE VISIT (OUTPATIENT)
Dept: SURGERY | Facility: CLINIC | Age: 56
End: 2021-04-21
Attending: THORACIC SURGERY (CARDIOTHORACIC VASCULAR SURGERY)
Payer: COMMERCIAL

## 2021-04-21 ENCOUNTER — ANCILLARY PROCEDURE (OUTPATIENT)
Dept: GENERAL RADIOLOGY | Facility: CLINIC | Age: 56
End: 2021-04-21
Attending: PSYCHIATRY & NEUROLOGY
Payer: COMMERCIAL

## 2021-04-21 VITALS
HEART RATE: 94 BPM | SYSTOLIC BLOOD PRESSURE: 146 MMHG | BODY MASS INDEX: 26.75 KG/M2 | WEIGHT: 176.5 LBS | RESPIRATION RATE: 16 BRPM | DIASTOLIC BLOOD PRESSURE: 96 MMHG | OXYGEN SATURATION: 98 % | HEIGHT: 68 IN

## 2021-04-21 DIAGNOSIS — G70.00 MYASTHENIA GRAVIS (H): ICD-10-CM

## 2021-04-21 DIAGNOSIS — I49.2 JUNCTIONAL PREMATURE DEPOLARIZATION (H): Primary | ICD-10-CM

## 2021-04-21 DIAGNOSIS — G70.01 MYASTHENIA GRAVIS WITH EXACERBATION (H): ICD-10-CM

## 2021-04-21 PROCEDURE — 71046 X-RAY EXAM CHEST 2 VIEWS: CPT | Mod: GC | Performed by: RADIOLOGY

## 2021-04-21 PROCEDURE — G0463 HOSPITAL OUTPT CLINIC VISIT: HCPCS

## 2021-04-21 PROCEDURE — 99024 POSTOP FOLLOW-UP VISIT: CPT | Performed by: CLINICAL NURSE SPECIALIST

## 2021-04-21 ASSESSMENT — MIFFLIN-ST. JEOR: SCORE: 1615.1

## 2021-04-21 ASSESSMENT — PAIN SCALES - GENERAL: PAINLEVEL: NO PAIN (0)

## 2021-04-21 NOTE — NURSING NOTE
"Oncology Rooming Note    April 21, 2021 1:56 PM   Leonidas Pratt is a 56 year old male who presents for:    Chief Complaint   Patient presents with     Oncology Clinic Visit     MYASTHENIA GRAVIS WITH EXACERBATION      Initial Vitals: BP (!) 146/96   Pulse 94   Resp 16   Ht 1.727 m (5' 8\")   Wt 80.1 kg (176 lb 8 oz)   SpO2 98%   BMI 26.84 kg/m   Estimated body mass index is 26.84 kg/m  as calculated from the following:    Height as of this encounter: 1.727 m (5' 8\").    Weight as of this encounter: 80.1 kg (176 lb 8 oz). Body surface area is 1.96 meters squared.  No Pain (0) Comment: Data Unavailable   No LMP for male patient.  Allergies reviewed: Yes  Medications reviewed: Yes    Medications: Medication refills not needed today.  Pharmacy name entered into EPIC:    [facility] [place], [place] - [address] [place] [address] [phone]  [facility] [place], [place] - [address] [facility] [place], [place] [address] [id number] [place], [place] - [address] AT [facility] & [address] [place] [address] [facility] ST[facility] #[id number] - [place], [place] - [address] [facility] [address]    Clinical concerns: No new concerns today  Tena Jeannine  was NOT notified.      Mariah Dawson            "

## 2021-04-21 NOTE — LETTER
4/21/2021         RE: Leonidas WRIGHT Terence  [address]  [address]  [place] [address]        Dear Colleague,    Thank you for referring your patient, Leonidas Pratt, to the [facility]. Please see a copy of my visit note below.      I saw Dr. Leonidas Pratt in follow-up today. The clinical summary follows:     PREOP DIAGNOSIS   Non-thymomatous myasthenia gravis  PROCEDURE   Subxiphoid bilateral thoracoscopy thymectomy  DATE OF PROCEDURE  3/22/21    COMPLICATIONS  None    HISTOPATHOLOGY  FINAL DIAGNOSIS:   A. Right pericardial fat:   - Benign adipose tissue     B. Thymus   - Benign thymic tissue with fatty replacement   - Benign lymph nodes (see comment)     INTERVAL STUDIES  CXR today:  Clear lungs with no pleural effusion or pneumothorax.  Cardiac silhouette is within normal limits.    SUBJECTIVE  I am doing fairly well- I have returned to seeing clinic patients.   I still am feeling some shortness of breath and went to the ED recently for an arrythmia.    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   We discussed pathology (negative) and reviewed his CXR which does not demonstrate any cardiac effusion or lung opacities/ no pneumothorax.  On ausculation, his lungs are clear.   He does continue to have an arrhythmia with occasional skipped beats.   He wondered if this was due to the anesthetics taking longer to clear his system.   He said it will sometimes awaken him while sleeping.  EKG done 4/13 in the ED was read as an accelerated junctional rhythem, rate 103.  His incisions are healing well with no drainage or erythema.          We discussed his follow-up with his neurologist in a few months.   He was not given a referral to cardiology.    Although he is minimizing the need for further f/u, it obviously is concerning to him and continues.    It is unclear if this may be contributing to his dyspnea or if that is due to deconditioning as he continues to recover.       PLAN  I spent 35 min on the date of the encounter in chart review, patient visit, review of tests, documentation and/or discussion with other providers about the issues documented above. I reviewed the plan as follows:  Call or return PRN  Necessary Tests & Appointments: Referral placed to cardiology to evaluate his arythmia further  Pain Control Plan: N/A  Anticoagulation Plan: N/A  Smoking Cessation: N/A  All questions were answered and the patient and present family were in agreement with the plan.  I appreciate the opportunity to participate in the care of your patient and will keep you updated.  Sincerely,  MARY Nice, CNS      Again, thank you for allowing me to participate in the care of your patient.        Sincerely,        MARY Giraldo CNS

## 2021-04-22 NOTE — PROGRESS NOTES
I saw Dr. Leonidas Pratt in follow-up today. The clinical summary follows:     PREOP DIAGNOSIS   Non-thymomatous myasthenia gravis  PROCEDURE   Subxiphoid bilateral thoracoscopy thymectomy  DATE OF PROCEDURE  3/22/21    COMPLICATIONS  None    HISTOPATHOLOGY  FINAL DIAGNOSIS:   A. Right pericardial fat:   - Benign adipose tissue     B. Thymus   - Benign thymic tissue with fatty replacement   - Benign lymph nodes (see comment)     INTERVAL STUDIES  CXR today:  Clear lungs with no pleural effusion or pneumothorax.  Cardiac silhouette is within normal limits.    SUBJECTIVE  I am doing fairly well- I have returned to seeing clinic patients.   I still am feeling some shortness of breath and went to the ED recently for an arrythmia.    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   We discussed pathology (negative) and reviewed his CXR which does not demonstrate any cardiac effusion or lung opacities/ no pneumothorax.  On ausculation, his lungs are clear.   He does continue to have an arrhythmia with occasional skipped beats.   He wondered if this was due to the anesthetics taking longer to clear his system.   He said it will sometimes awaken him while sleeping.  EKG done 4/13 in the ED was read as an accelerated junctional rhythem, rate 103.  His incisions are healing well with no drainage or erythema.          We discussed his follow-up with his neurologist in a few months.   He was not given a referral to cardiology.    Although he is minimizing the need for further f/u, it obviously is concerning to him and continues.   It is unclear if this may be contributing to his dyspnea or if that is due to deconditioning as he continues to recover.       PLAN  I spent 35 min on the date of the encounter in chart review, patient visit, review of tests, documentation and/or discussion with other  providers about the issues documented above. I reviewed the plan as follows:  Call or return PRN  Necessary Tests & Appointments: Referral placed to cardiology to evaluate his arythmia further  Pain Control Plan: N/A  Anticoagulation Plan: N/A  Smoking Cessation: N/A  All questions were answered and the patient and present family were in agreement with the plan.  I appreciate the opportunity to participate in the care of your patient and will keep you updated.  Sincerely,  MARY Nice, CNS

## 2021-04-26 ENCOUNTER — TELEPHONE (OUTPATIENT)
Dept: CARDIOLOGY | Facility: CLINIC | Age: 56
End: 2021-04-26

## 2021-04-26 NOTE — TELEPHONE ENCOUNTER
"Pt needs to schedule an appointment with an ep/arrhythmia cardiologist per a referral.    CARDIOLOGY EVAL ADULT REFERRAL has been cancelled; can be found in the \"finalized requests\" tab of the patient's appointment desk.    Please reinstate appointment request (right click request and select \"reinstate\") please link to new appt when scheduling.   "

## 2021-04-28 ENCOUNTER — OFFICE VISIT (OUTPATIENT)
Dept: CARDIOLOGY | Facility: CLINIC | Age: 56
End: 2021-04-28
Attending: INTERNAL MEDICINE
Payer: COMMERCIAL

## 2021-04-28 DIAGNOSIS — I49.8 ATRIAL BIGEMINY: Primary | ICD-10-CM

## 2021-04-28 DIAGNOSIS — I49.8 ATRIAL BIGEMINY: ICD-10-CM

## 2021-04-28 DIAGNOSIS — R00.2 PALPITATIONS: ICD-10-CM

## 2021-04-28 NOTE — PROGRESS NOTES
Per Dr. Marlo Maharaj patient to have 48 hour Holter Monitor placed.  Diagnosis: Atrial bigeminy I49.8, Palpitations R00.2  Monitor placed: Yes  Patient Instructed: Yes  Patient verbalized understanding: Yes  Holter # 9  Placed by Adore Reich MA

## 2021-04-28 NOTE — LETTER
4/28/2021      RE: Leonidas Pratt  [address]  [address]  [place] [address]       Dear Colleague,    Thank you for the opportunity to participate in the care of your patient, Leonidas Pratt, at the [facility]. Please see a copy of my visit note below.    Per Dr. Marlo Maharaj patient to have 48 hour Holter Monitor placed.  Diagnosis: Atrial bigeminy I49.8, Palpitations R00.2  Monitor placed: Yes  Patient Instructed: Yes  Patient verbalized understanding: Yes  Holter # 9  Placed by Adore Reich MA        Please do not hesitate to contact me if you have any questions/concerns.     Sincerely,     Cardiovascular Monitor Tech, TH

## 2021-05-07 ENCOUNTER — VIRTUAL VISIT (OUTPATIENT)
Dept: CARDIOLOGY | Facility: CLINIC | Age: 56
End: 2021-05-07
Attending: INTERNAL MEDICINE
Payer: COMMERCIAL

## 2021-05-07 DIAGNOSIS — R00.2 PALPITATIONS: Primary | ICD-10-CM

## 2021-05-07 DIAGNOSIS — I49.2 JUNCTIONAL PREMATURE DEPOLARIZATION (H): ICD-10-CM

## 2021-05-07 DIAGNOSIS — Z82.49 FAMILY HISTORY OF CORONARY ARTERY DISEASE: ICD-10-CM

## 2021-05-07 NOTE — LETTER
"5/7/2021      RE: Nieves Pratt  [address]  [address]  [place] [address]       Dear Colleague,    Thank you for the opportunity to participate in the care of your patient, Nieves Pratt, at the [facility]. Please see a copy of my visit note below.    nieves is a 56 year old who is being evaluated via a billable video visit.      How would you like to obtain your AVS? MyChart  If the video visit is dropped, the invitation should be resent by: Send to e-mail at: [email or web address]  Will anyone else be joining your video visit? No    Vitals - Patient Reported  Weight (Patient Reported): 79.4 kg (175 lb)  Height (Patient Reported): 175.3 cm (5' 9\")  BMI (Based on Pt Reported Ht/Wt): 25.84  Pain Score: No Pain (0)  Pain Loc: Chest            Clinical Cardiac Electrophysiology    Chief Complaint: palpitations    HPI: I was happy to see Dr. Pratt for the above. He recently underwent thymectomy. Post-op he was having significant palpitations. An ECG (personally reviewed) showed atrial bigeminy. There was also an ECG showing accelrated junctional rhythm, though I am unable to fine it in Epic.     He subsequently had a Holter monitor placed. I have reviewed those strips personally as well. Rare atrial and ventricular premature beats but no significant or sustained arrhythmia.     Current Outpatient Medications   Medication Sig Dispense Refill     albuterol (PROAIR HFA/PROVENTIL HFA/VENTOLIN HFA) 108 (90 BASE) MCG/ACT Inhaler Inhale 2 puffs into the lungs every 6 hours as needed for shortness of breath / dyspnea or wheezing 1 Inhaler 0     levothyroxine (SYNTHROID/LEVOTHROID) 150 MCG tablet Take 150 mcg by mouth daily       predniSONE (DELTASONE) 5 MG tablet Take 1 tablet (5 mg) by mouth daily 120 tablet 11     Vitamin D, Cholecalciferol, 25 MCG (1000 UT) TABS Take by mouth every morning       acetaminophen (TYLENOL) 500 MG " tablet Take 2 tablets (1,000 mg) by mouth every 6 hours       ibuprofen (ADVIL/MOTRIN) 600 MG tablet Take 1 tablet (600 mg) by mouth every 6 hours as needed for moderate pain       levothyroxine (SYNTHROID/LEVOTHROID) 150 MCG tablet Take 1 tablet (150 mcg) by mouth daily (Patient taking differently: Take 150 mcg by mouth every morning ) 90 tablet 3     oxyCODONE (ROXICODONE) 5 MG tablet Take 1 tablet (5 mg) by mouth every 3 hours as needed for moderate to severe pain 50 tablet 0     polyethylene glycol (MIRALAX) 17 g packet Take 17 g by mouth daily 7 packet 0     senna-docusate (SENOKOT-S/PERICOLACE) 8.6-50 MG tablet Take 1 tablet by mouth 2 times daily 14 tablet 0       Past Medical History:   Diagnosis Date     Asthma      Kidney stone      Myasthenia gravis (H)      Strabismus      Thyroid disease        Past Surgical History:   Procedure Laterality Date     HERNIA REPAIR       LASER HOLMIUM LITHOTRIPSY URETER(S), INSERT STENT, COMBINED Right 10/15/2014    Procedure: COMBINED CYSTOSCOPY, URETEROSCOPY, LASER HOLMIUM LITHOTRIPSY URETER(S), INSERT STENT;  Surgeon: Thong Bates MD;  Location: [facility] OR     RECESSION RESECTION WITH ADJUSTABLE SUTURE  9/4/2012    LLRc 5.0mm on adj. LIRc 4.5mm on adj.     RECESSION RESECTION WITH ADJUSTABLE SUTURE BILATERAL  12/18/2012    RSRc 3.0mm; adj. suture     STRABISMUS SURGERY       THORACOSCOPIC THYMECTOMY N/A 3/22/2021    Procedure: SUBXIPHOID BILATERAL THORACOSCOPIC THYMECTOMY;  Surgeon: Benjy Hull MD;  Location: [facility] OR       Family History   Problem Relation Age of Onset     Thyroid Disease Sister        Social History     Tobacco Use     Smoking status: Never Smoker     Smokeless tobacco: Never Used   Substance Use Topics     Alcohol use: No       Allergies   Allergen Reactions     Nkda [No Known Drug Allergies]            Physical Examination:  CONSITUTIONAL: no acute distress  HEENT: no icterus, sclerae white  CV: no visible edema of visualized upper  extremities, no gross JVD  CHEST: respirations nonlabored, no audible wheezing  NEURO: AA&Ox3, speech fluent/appropriate, motor grossly nonfocal  PSYCH: cooperative, affect appropriate  DERM: no rashes on visualized face/neck/upper extremities    The rest of a comprehensive physical examination is deferred due to PHE (public health emergency) video visit restrictions.        Laboratory and diagnostic data reviewed personally [id number]:    Results for NELL PRATT (MRN [id number]) as of 5/19/2021 09:13   Ref. Range 4/13/2021 01:55   Sodium Latest Ref Range: 133 - 144 mmol/L 138   Potassium Latest Ref Range: 3.4 - 5.3 mmol/L 4.4   Chloride Latest Ref Range: 94 - 109 mmol/L 107   Carbon Dioxide Latest Ref Range: 20 - 32 mmol/L 27   Urea Nitrogen Latest Ref Range: 7 - 30 mg/dL 16   Creatinine Latest Ref Range: 0.66 - 1.25 mg/dL 0.92   GFR Estimate Latest Ref Range: >60 mL/min/1.73_m2 >90     Results for NELL PRATT (MRN [id number]) as of 5/19/2021 09:13   Ref. Range 4/13/2021 01:55   TSH Latest Ref Range: 0.40 - 4.00 mU/L 3.45     Results for NELL PRATT (MRN [id number]) as of 5/19/2021 09:13   Ref. Range 4/13/2021 01:55   WBC Latest Ref Range: 4.0 - 11.0 10e9/L 8.1   Hemoglobin Latest Ref Range: 13.3 - 17.7 g/dL 13.2 (L)   Hematocrit Latest Ref Range: 40.0 - 53.0 % 39.4 (L)   Platelet Count Latest Ref Range: 150 - 450 10e9/L 383                 Assessment and recommendations:    1) Palpitations - post-thymectomy; no atrial fibrillation identified. Holter shows rare ectopy. No sustained arrhythmia identified.     We discussed empiric beta blocker therapy should palpitations become more troublesome. Will hold off for now.     2) Family h/o coronary artery disease - prevention clinic referral    I appreciate the chance to help with Dr. Pratt's care. Please let me know if you have any questions or concerns.        Please do not hesitate to contact me if you have any questions/concerns.     Sincerely,      Marlo Maharaj MD

## 2021-05-07 NOTE — PROGRESS NOTES
"nieves is a 56 year old who is being evaluated via a billable video visit.      How would you like to obtain your AVS? MyChart  If the video visit is dropped, the invitation should be resent by: Send to e-mail at: [email or web address]  Will anyone else be joining your video visit? No    Vitals - Patient Reported  Weight (Patient Reported): 79.4 kg (175 lb)  Height (Patient Reported): 175.3 cm (5' 9\")  BMI (Based on Pt Reported Ht/Wt): 25.84  Pain Score: No Pain (0)  Pain Loc: Chest            Clinical Cardiac Electrophysiology    Chief Complaint: palpitations    HPI: I was happy to see Dr. Pratt for the above. He recently underwent thymectomy. Post-op he was having significant palpitations. An ECG (personally reviewed) showed atrial bigeminy. There was also an ECG showing accelrated junctional rhythm, though I am unable to fine it in Epic.     He subsequently had a Holter monitor placed. I have reviewed those strips personally as well. Rare atrial and ventricular premature beats but no significant or sustained arrhythmia.     Current Outpatient Medications   Medication Sig Dispense Refill     albuterol (PROAIR HFA/PROVENTIL HFA/VENTOLIN HFA) 108 (90 BASE) MCG/ACT Inhaler Inhale 2 puffs into the lungs every 6 hours as needed for shortness of breath / dyspnea or wheezing 1 Inhaler 0     levothyroxine (SYNTHROID/LEVOTHROID) 150 MCG tablet Take 150 mcg by mouth daily       predniSONE (DELTASONE) 5 MG tablet Take 1 tablet (5 mg) by mouth daily 120 tablet 11     Vitamin D, Cholecalciferol, 25 MCG (1000 UT) TABS Take by mouth every morning       acetaminophen (TYLENOL) 500 MG tablet Take 2 tablets (1,000 mg) by mouth every 6 hours       ibuprofen (ADVIL/MOTRIN) 600 MG tablet Take 1 tablet (600 mg) by mouth every 6 hours as needed for moderate pain       levothyroxine (SYNTHROID/LEVOTHROID) 150 MCG tablet Take 1 tablet (150 mcg) by mouth daily (Patient taking differently: Take 150 mcg by mouth every morning ) 90 tablet 3     " oxyCODONE (ROXICODONE) 5 MG tablet Take 1 tablet (5 mg) by mouth every 3 hours as needed for moderate to severe pain 50 tablet 0     polyethylene glycol (MIRALAX) 17 g packet Take 17 g by mouth daily 7 packet 0     senna-docusate (SENOKOT-S/PERICOLACE) 8.6-50 MG tablet Take 1 tablet by mouth 2 times daily 14 tablet 0       Past Medical History:   Diagnosis Date     Asthma      Kidney stone      Myasthenia gravis (H)      Strabismus      Thyroid disease        Past Surgical History:   Procedure Laterality Date     HERNIA REPAIR       LASER HOLMIUM LITHOTRIPSY URETER(S), INSERT STENT, COMBINED Right 10/15/2014    Procedure: COMBINED CYSTOSCOPY, URETEROSCOPY, LASER HOLMIUM LITHOTRIPSY URETER(S), INSERT STENT;  Surgeon: Thong Bates MD;  Location: [facility] OR     RECESSION RESECTION WITH ADJUSTABLE SUTURE  9/4/2012    LLRc 5.0mm on adj. LIRc 4.5mm on adj.     RECESSION RESECTION WITH ADJUSTABLE SUTURE BILATERAL  12/18/2012    RSRc 3.0mm; adj. suture     STRABISMUS SURGERY       THORACOSCOPIC THYMECTOMY N/A 3/22/2021    Procedure: SUBXIPHOID BILATERAL THORACOSCOPIC THYMECTOMY;  Surgeon: Benjy Hull MD;  Location: [facility] OR       Family History   Problem Relation Age of Onset     Thyroid Disease Sister        Social History     Tobacco Use     Smoking status: Never Smoker     Smokeless tobacco: Never Used   Substance Use Topics     Alcohol use: No       Allergies   Allergen Reactions     Nkda [No Known Drug Allergies]            Physical Examination:  CONSITUTIONAL: no acute distress  HEENT: no icterus, sclerae white  CV: no visible edema of visualized upper extremities, no gross JVD  CHEST: respirations nonlabored, no audible wheezing  NEURO: AA&Ox3, speech fluent/appropriate, motor grossly nonfocal  PSYCH: cooperative, affect appropriate  DERM: no rashes on visualized face/neck/upper extremities    The rest of a comprehensive physical examination is deferred due to PHE (public health emergency) video visit  restrictions.        Laboratory and diagnostic data reviewed personally [id number]:    Results for NELL PRATT (MRN [id number]) as of 5/19/2021 09:13   Ref. Range 4/13/2021 01:55   Sodium Latest Ref Range: 133 - 144 mmol/L 138   Potassium Latest Ref Range: 3.4 - 5.3 mmol/L 4.4   Chloride Latest Ref Range: 94 - 109 mmol/L 107   Carbon Dioxide Latest Ref Range: 20 - 32 mmol/L 27   Urea Nitrogen Latest Ref Range: 7 - 30 mg/dL 16   Creatinine Latest Ref Range: 0.66 - 1.25 mg/dL 0.92   GFR Estimate Latest Ref Range: >60 mL/min/1.73_m2 >90     Results for NELL PRATT (MRN [id number]) as of 5/19/2021 09:13   Ref. Range 4/13/2021 01:55   TSH Latest Ref Range: 0.40 - 4.00 mU/L 3.45     Results for NELL PRATT (MRN [id number]) as of 5/19/2021 09:13   Ref. Range 4/13/2021 01:55   WBC Latest Ref Range: 4.0 - 11.0 10e9/L 8.1   Hemoglobin Latest Ref Range: 13.3 - 17.7 g/dL 13.2 (L)   Hematocrit Latest Ref Range: 40.0 - 53.0 % 39.4 (L)   Platelet Count Latest Ref Range: 150 - 450 10e9/L 383                 Assessment and recommendations:    1) Palpitations - post-thymectomy; no atrial fibrillation identified. Holter shows rare ectopy. No sustained arrhythmia identified.     We discussed empiric beta blocker therapy should palpitations become more troublesome. Will hold off for now.     2) Family h/o coronary artery disease - prevention clinic referral    I appreciate the chance to help with Dr. Pratt's care. Please let me know if you have any questions or concerns.

## 2021-05-13 DIAGNOSIS — Z82.49 FAMILY HISTORY OF CORONARY ARTERY DISEASE: Primary | ICD-10-CM

## 2021-05-13 NOTE — PROGRESS NOTES
Date: 5/13/2021    Time of Call: 2:05 PM     Diagnosis:  Family history of CAD     [ TORB ] Ordering provider: Marlo Maharaj MD  Order:   Adult cardiology referral:  Prevention clinic with Dr. Jose Elias Munoz     Order received by: Adenike Lebron RN     Follow-up/additional notes:   The above has been ordered and the pt will be scheduled.     Pt was called and VM left with this information and the number to the call center if he has any questions.

## 2021-07-07 NOTE — PROGRESS NOTES
PREVENTIVE CARDIOLOGY INITIAL CONSULTATION    HPI:  Leonidas Pratt is a 56 year old male who receives primary care from Dr. Moreno King. He was referred to Cardiology clinic by my colleague Dr. Marlo Maharaj for family history of CVD.    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 CVD risk factors include hyperlipidemia, borderline blood pressure, family history of CVD, and autoimmune disease.  He has a history of myasthenia gravis and had a thymectomy about 3 months ago.  After surgery he developed palpitations and an ED EKG was thought to show accelerated junctional rhythm, but there are no available rhythm strips showing this rhythm.  He did have atrial bigeminy on twelve-lead EKG.  He saw my colleague Dr. Mac who ordered a Holter monitor which did not show any concerning arrhythmias.     Dr. 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  Last LDL is 173 mg/dL and has mostly ranged between 140s to 170s.  Over the past year his blood pressure has been borderline, but prior to this when he was exercising more it was well controlled.  Hemoglobin A1c was normal.  Atherosclerotic disease runs on his mother side of the family. His mother had a coronary stent at age 59 due to unstable angina rather than MI.  She is still living at age 91.  Both maternal grandparents  before the age of 60 from some type of heart disease.  He thinks his grandfather had an MI.  He has a maternal aunt with high cholesterol, but she lived into her 80s.    Since the thymectomy Dr. Pratt has gained strength and energy.  He is already starting to exercise more rigorously than he has in the past year.  Currently is doing a boot camp like [employer] routine every morning before work.  With these activities he denies chest pain or dyspnea.  Palpitations have also improved.    Dr. Pratt eats a healthy diet as outlined below.  Breakfast: blueberries , milk, protein shake  Lunch: vegetable pack,  protein bar  Dinner: fish, pork, chicken, limited bread/rice/pasta  Drinks: no soda, water, milk, no alcohol  Snacks: no snacking    There is a nongated CT chest from April 2021 which we reviewed together.  This does not show any coronary artery calcifications.    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    The 10-year ASCVD risk score (Lindsay LEONARDO Jr., et al., 2013) is: 6.3%    Values used to calculate the score:      Age: 56 years      Sex: Male      Is Non- : No      Diabetic: No      Tobacco smoker: No      Systolic Blood Pressure: 121 mmHg      Is BP treated: No      HDL Cholesterol: 52 mg/dL      Total Cholesterol: 266 mg/dL     ASSESSMENT AND PLAN  Leonidas Pratt is a 56 year old male with no history of clinical atherosclerotic CVD. He has myasthenia gravis and recently underwent thymectomy. His main CV risk factor is elevated cholesterol with LDL cholesterol in the 170s milligrams per deciliter. His blood pressure has been borderline elevated in the last 6 months, but today is pretty well controlled. His calculated CVD risk is 6.3% by the pooled cohort equation. He does have a CT chest which did not show any coronary artery calcifications. This helps reassure a low risk of a cardiovascular event in the next 5 to 10 years.    From a lifestyle standpoint Guru Pratt has been doing very well. His physical activity was lower than he would like over the past year due to weakness from MG. However, after thymectomy his energy is already improving and his physical activity levels are getting back to normal.    Elevated cholesterol and family history of CVD are notable, but with no coronary artery calcifications on CT scan, I think we can hold off on lipid-lowering therapy in the short-term. Dr. Pratt plans to continue his increase physical activity and healthy diet over the next year and repeat lipid panel in 12 months. If at that time, LDL cholesterol remains  greater than 160 mg/dL it would be reasonable to start a low-dose statin like atorvastatin 10 mg or rosuvastatin 5 mg daily.     I would consider repeating a coronary calcium scan in 5 years to reassess for signs of early atherosclerotic CVD.  Follow-up in preventive cardiology clinic as needed.    Thank you for the opportunity to participate in the care of Dr. Leonidas Pratt. Please feel free to contact me with any additional questions or concerns.    Jose Elias Munoz MD    Preventive Cardiology  Pager: [phone]    The total time of this encounter amounted to 60 minutes. This time included time spent with the patient, reviewing records, ordering tests, and performing post visit documentation.     PAST MEDICAL HISTORY:  Patient Active Problem List   Diagnosis     Hypothyroidism     Pre-operative cardiovascular examination     Routine general medical examination at a health care facility     Calculus of ureter (URETERAL)     Exposure to strep throat     Myasthenia gravis with exacerbation (H)     Past Medical History:   Diagnosis Date     Asthma      Kidney stone      Myasthenia gravis (H)      Strabismus      Thyroid disease        CURRENT MEDICATIONS:  Current Outpatient Medications   Medication Sig Dispense Refill     albuterol (PROAIR HFA/PROVENTIL HFA/VENTOLIN HFA) 108 (90 BASE) MCG/ACT Inhaler Inhale 2 puffs into the lungs every 6 hours as needed for shortness of breath / dyspnea or wheezing 1 Inhaler 0     levothyroxine (SYNTHROID/LEVOTHROID) 150 MCG tablet Take 150 mcg by mouth daily       predniSONE (DELTASONE) 5 MG tablet Take 1 tablet (5 mg) by mouth daily 120 tablet 11     pyridostigmine (MESTINON) 60 MG tablet Take 60 mg by mouth daily       Vitamin D, Cholecalciferol, 25 MCG (1000 UT) TABS Take by mouth every morning       acetaminophen (TYLENOL) 500 MG tablet Take 2 tablets (1,000 mg) by mouth every 6 hours       ibuprofen (ADVIL/MOTRIN) 600 MG tablet Take 1 tablet (600 mg) by  mouth every 6 hours as needed for moderate pain       levothyroxine (SYNTHROID/LEVOTHROID) 150 MCG tablet Take 1 tablet (150 mcg) by mouth daily (Patient taking differently: Take 150 mcg by mouth every morning ) 90 tablet 3     oxyCODONE (ROXICODONE) 5 MG tablet Take 1 tablet (5 mg) by mouth every 3 hours as needed for moderate to severe pain 50 tablet 0     polyethylene glycol (MIRALAX) 17 g packet Take 17 g by mouth daily 7 packet 0     senna-docusate (SENOKOT-S/PERICOLACE) 8.6-50 MG tablet Take 1 tablet by mouth 2 times daily 14 tablet 0       PAST SURGICAL HISTORY:  Past Surgical History:   Procedure Laterality Date     HERNIA REPAIR       LASER HOLMIUM LITHOTRIPSY URETER(S), INSERT STENT, COMBINED Right 10/15/2014    Procedure: COMBINED CYSTOSCOPY, URETEROSCOPY, LASER HOLMIUM LITHOTRIPSY URETER(S), INSERT STENT;  Surgeon: Thong Bates MD;  Location: [facility]     RECESSION RESECTION WITH ADJUSTABLE SUTURE  9/4/2012    LLRc 5.0mm on adj. LIRc 4.5mm on adj.     RECESSION RESECTION WITH ADJUSTABLE SUTURE BILATERAL  12/18/2012    RSRc 3.0mm; adj. suture     STRABISMUS SURGERY       THORACOSCOPIC THYMECTOMY N/A 3/22/2021    Procedure: SUBXIPHOID BILATERAL THORACOSCOPIC THYMECTOMY;  Surgeon: Benjy Hull MD;  Location: [facility]       ALLERGIES  Nkda [no known drug allergies]    FAMILY HX:  Family History   Problem Relation Age of Onset     Coronary Artery Disease Mother 59     Valvular heart disease Father      Heart Failure Father      Heart Disease Maternal Grandmother      Heart Disease Maternal Grandfather      Thyroid Disease Sister        SOCIAL HX:  Social History     Tobacco Use     Smoking status: Never Smoker     Smokeless tobacco: Never Used   Substance Use Topics     Alcohol use: No     Drug use: No        ROS:  Comprehensive ROS is negative except as noted in HPI.    VITAL SIGNS:  /80 (BP Location: Right arm, Patient Position: Chair, Cuff Size: Adult Regular)   Pulse 65   Ht 1.715  "m (5' 7.5\")   Wt 78.5 kg (173 lb)   SpO2 97%   BMI 26.70 kg/m    Body mass index is 26.7 kg/m .  Wt Readings from Last 2 Encounters:   07/09/21 78.5 kg (173 lb)   04/21/21 80.1 kg (176 lb 8 oz)       PHYSICAL EXAM  Gen: pleasant man sitting comfortably in NAD  Head: nc/at  Eyes: EOMI, PERRL  ENT: trachea is midline  Neck: supple, no lymphadenopathy  CV: nml s1/s2, no murmur or gallop; no JVD  Chest: clear lungs  Abd: soft, NT, NABS  Ext: warm, no LE edema  Skin: no rash on limited exam  Neuro: awake, alert, oriented, nml speech and gait  Vasc: 2+ bilateral carotid, brachial, radial, DP and PT pulses; no bruits noted    LABS: personally reviewed  CMP  Recent Labs   Lab Test 04/13/21  0155 03/24/21  0652 03/23/21  0700 03/22/21  1617 03/22/21  1028 03/17/21  1521 03/17/21  1521 06/27/17  1747 06/09/17  0718    136 136  --  139   < > 138  --  139   POTASSIUM 4.4 4.0 3.9  --  4.3   < > 4.5  --  3.9   CHLORIDE 107 105 105  --   --   --  105  --  106   CO2 27 26 28  --   --   --  27  --  26   ANIONGAP 4 5 3  --   --   --  5  --  7   GLC 86 85 90  --  166*   < > 82  --  84   BUN 16 14 13  --   --   --  24  --  17   CR 0.92 1.00 1.00 1.02  --   --  1.01  --  1.07   GFRESTIMATED >90 85 85 83  --   --  84  --  73   GFRESTBLACK >90 >90 >90 >90  --   --  >90  --  88   YOAN 8.3* 9.3 8.8  --   --   --  9.0  --  7.8*   MAG 2.2  --   --   --   --   --   --   --   --    PHOS 3.5  --   --   --   --   --   --   --   --    PROTTOTAL 7.6  --   --   --   --   --   --  8.1 6.3*   ALBUMIN 3.3*  --   --   --   --   --   --  4.4 3.4   BILITOTAL 0.2  --   --   --   --   --   --  0.6 0.4   ALKPHOS 131  --   --   --   --   --   --  100 77   AST 24  --   --   --   --   --   --  24 17   ALT 43  --   --   --   --   --   --  36 21    < > = values in this interval not displayed.     CBC  Recent Labs   Lab Test 04/13/21  0155 03/24/21  0652 03/23/21  0700 03/22/21  1028 03/17/21  1521 03/17/21  1521   WBC 8.1 11.1* 8.3  --   --  5.6   RBC " 4.23* 4.32* 4.37*  --   --  4.73   HGB 13.2* 13.8 13.5 13.9   < > 14.5   HCT 39.4* 41.3 41.1  --   --  43.6   MCV 93 96 94  --   --  92   MCH 31.2 31.9 30.9  --   --  30.7   MCHC 33.5 33.4 32.8  --   --  33.3   RDW 12.4 12.6 12.6  --   --  12.4    225 235  --   --  265    < > = values in this interval not displayed.     INRNo lab results found.  Recent Labs   Lab Test 10/14/20  1417 09/30/19  1132   CHOL 266* 247*   HDL 52 44   * 171*   TRIG 205* 159*     Recent Labs   Lab Test 10/14/20  1417 09/30/19  1132   A1C 5.2 5.4       Most recent EKG: reviewed and discussed with the patient  4/13/21: atrial bigeminy    Other Imaging: reviewed and discussed with the patient  4/13/21 CT Chest: no coronary artery calcifications

## 2021-07-09 ENCOUNTER — OFFICE VISIT (OUTPATIENT)
Dept: CARDIOLOGY | Facility: CLINIC | Age: 56
End: 2021-07-09
Attending: INTERNAL MEDICINE
Payer: COMMERCIAL

## 2021-07-09 VITALS
HEART RATE: 65 BPM | DIASTOLIC BLOOD PRESSURE: 80 MMHG | SYSTOLIC BLOOD PRESSURE: 121 MMHG | OXYGEN SATURATION: 97 % | HEIGHT: 68 IN | WEIGHT: 173 LBS | BODY MASS INDEX: 26.22 KG/M2

## 2021-07-09 DIAGNOSIS — E78.5 HYPERLIPIDEMIA LDL GOAL <160: Primary | ICD-10-CM

## 2021-07-09 DIAGNOSIS — G70.01 MYASTHENIA GRAVIS WITH EXACERBATION (H): ICD-10-CM

## 2021-07-09 DIAGNOSIS — Z82.49 FAMILY HISTORY OF CORONARY ARTERY DISEASE: ICD-10-CM

## 2021-07-09 PROCEDURE — G0463 HOSPITAL OUTPT CLINIC VISIT: HCPCS

## 2021-07-09 PROCEDURE — 99215 OFFICE O/P EST HI 40 MIN: CPT | Performed by: INTERNAL MEDICINE

## 2021-07-09 RX ORDER — PYRIDOSTIGMINE BROMIDE 60 MG/1
60 TABLET ORAL DAILY
COMMUNITY
End: 2021-10-28

## 2021-07-09 ASSESSMENT — PAIN SCALES - GENERAL: PAINLEVEL: NO PAIN (0)

## 2021-07-09 ASSESSMENT — MIFFLIN-ST. JEOR: SCORE: 1591.28

## 2021-07-09 NOTE — LETTER
2021      RE: Leonidas Pratt  [address]  [address]  [place] [address]       Dear Colleague,    Thank you for the opportunity to participate in the care of your patient, Leonidas Pratt, at the [facility]. Please see a copy of my visit note below.    PREVENTIVE CARDIOLOGY INITIAL CONSULTATION    HPI:  Leonidas Pratt is a 56 year old male who receives primary care from Dr. Moreno King. He was referred to Cardiology clinic by my colleague Dr. Marlo Maharaj for family history of CVD.    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 CVD risk factors include hyperlipidemia, borderline blood pressure, family history of CVD, and autoimmune disease.  He has a history of myasthenia gravis and had a thymectomy about 3 months ago.  After surgery he developed palpitations and an ED EKG was thought to show accelerated junctional rhythm, but there are no available rhythm strips showing this rhythm.  He did have atrial bigeminy on twelve-lead EKG.  He saw my colleague Dr. Mac who ordered a Holter monitor which did not show any concerning arrhythmias.     Dr. 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  Last LDL is 173 mg/dL and has mostly ranged between 140s to 170s.  Over the past year his blood pressure has been borderline, but prior to this when he was exercising more it was well controlled.  Hemoglobin A1c was normal.  Atherosclerotic disease runs on his mother side of the family. His mother had a coronary stent at age 59 due to unstable angina rather than MI.  She is still living at age 91.  Both maternal grandparents  before the age of 60 from some type of heart disease.  He thinks his grandfather had an MI.  He has a maternal aunt with high cholesterol, but she lived into her 80s.    Since the thymectomy Dr. Pratt has gained strength and energy.  He is already  starting to exercise more rigorously than he has in the past year.  Currently is doing a boot camp like CrossFit routine every morning before work.  With these activities he denies chest pain or dyspnea.  Palpitations have also improved.     Terence eats a healthy diet as outlined below.  Breakfast: blueberries , milk, protein shake  Lunch: vegetable pack, protein bar  Dinner: fish, pork, chicken, limited bread/rice/pasta  Drinks: no soda, water, milk, no alcohol  Snacks: no snacking    There is a nongated CT chest from April 2021 which we reviewed together.  This does not show any coronary artery calcifications.    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    The 10-year ASCVD risk score (Lindsay LEONARDO Jr., et al., 2013) is: 6.3%    Values used to calculate the score:      Age: 56 years      Sex: Male      Is Non- : No      Diabetic: No      Tobacco smoker: No      Systolic Blood Pressure: 121 mmHg      Is BP treated: No      HDL Cholesterol: 52 mg/dL      Total Cholesterol: 266 mg/dL     ASSESSMENT AND PLAN  Leonidas Pratt is a 56 year old male with no history of clinical atherosclerotic CVD. He has myasthenia gravis and recently underwent thymectomy. His main CV risk factor is elevated cholesterol with LDL cholesterol in the 170s milligrams per deciliter. His blood pressure has been borderline elevated in the last 6 months, but today is pretty well controlled. His calculated CVD risk is 6.3% by the pooled cohort equation. He does have a CT chest which did not show any coronary artery calcifications. This helps reassure a low risk of a cardiovascular event in the next 5 to 10 years.    From a lifestyle standpoint Dr. Pratt has been doing very well. His physical activity was lower than he would like over the past year due to weakness from MG. However, after thymectomy his energy is already improving and his physical activity levels are getting back to  normal.    Elevated cholesterol and family history of CVD are notable, but with no coronary artery calcifications on CT scan, I think we can hold off on lipid-lowering therapy in the short-term. Dr. Pratt plans to continue his increase physical activity and healthy diet over the next year and repeat lipid panel in 12 months. If at that time, LDL cholesterol remains greater than 160 mg/dL it would be reasonable to start a low-dose statin like atorvastatin 10 mg or rosuvastatin 5 mg daily.     I would consider repeating a coronary calcium scan in 5 years to reassess for signs of early atherosclerotic CVD.  Follow-up in preventive cardiology clinic as needed.    Thank you for the opportunity to participate in the care of Dr. Leonidas Pratt. Please feel free to contact me with any additional questions or concerns.    Jose Elias Munoz MD    Preventive Cardiology  Pager: [phone]    The total time of this encounter amounted to 60 minutes. This time included time spent with the patient, reviewing records, ordering tests, and performing post visit documentation.     PAST MEDICAL HISTORY:  Patient Active Problem List   Diagnosis     Hypothyroidism     Pre-operative cardiovascular examination     Routine general medical examination at a health care facility     Calculus of ureter (URETERAL)     Exposure to strep throat     Myasthenia gravis with exacerbation (H)     Past Medical History:   Diagnosis Date     Asthma      Kidney stone      Myasthenia gravis (H)      Strabismus      Thyroid disease        CURRENT MEDICATIONS:  Current Outpatient Medications   Medication Sig Dispense Refill     albuterol (PROAIR HFA/PROVENTIL HFA/VENTOLIN HFA) 108 (90 BASE) MCG/ACT Inhaler Inhale 2 puffs into the lungs every 6 hours as needed for shortness of breath / dyspnea or wheezing 1 Inhaler 0     levothyroxine (SYNTHROID/LEVOTHROID) 150 MCG tablet Take 150 mcg by mouth daily       predniSONE (DELTASONE) 5 MG tablet  Take 1 tablet (5 mg) by mouth daily 120 tablet 11     pyridostigmine (MESTINON) 60 MG tablet Take 60 mg by mouth daily       Vitamin D, Cholecalciferol, 25 MCG (1000 UT) TABS Take by mouth every morning       acetaminophen (TYLENOL) 500 MG tablet Take 2 tablets (1,000 mg) by mouth every 6 hours       ibuprofen (ADVIL/MOTRIN) 600 MG tablet Take 1 tablet (600 mg) by mouth every 6 hours as needed for moderate pain       levothyroxine (SYNTHROID/LEVOTHROID) 150 MCG tablet Take 1 tablet (150 mcg) by mouth daily (Patient taking differently: Take 150 mcg by mouth every morning ) 90 tablet 3     oxyCODONE (ROXICODONE) 5 MG tablet Take 1 tablet (5 mg) by mouth every 3 hours as needed for moderate to severe pain 50 tablet 0     polyethylene glycol (MIRALAX) 17 g packet Take 17 g by mouth daily 7 packet 0     senna-docusate (SENOKOT-S/PERICOLACE) 8.6-50 MG tablet Take 1 tablet by mouth 2 times daily 14 tablet 0       PAST SURGICAL HISTORY:  Past Surgical History:   Procedure Laterality Date     HERNIA REPAIR       LASER HOLMIUM LITHOTRIPSY URETER(S), INSERT STENT, COMBINED Right 10/15/2014    Procedure: COMBINED CYSTOSCOPY, URETEROSCOPY, LASER HOLMIUM LITHOTRIPSY URETER(S), INSERT STENT;  Surgeon: Thong Bates MD;  Location: [facility] OR     RECESSION RESECTION WITH ADJUSTABLE SUTURE  9/4/2012    LLRc 5.0mm on adj. LIRc 4.5mm on adj.     RECESSION RESECTION WITH ADJUSTABLE SUTURE BILATERAL  12/18/2012    RSRc 3.0mm; adj. suture     STRABISMUS SURGERY       THORACOSCOPIC THYMECTOMY N/A 3/22/2021    Procedure: SUBXIPHOID BILATERAL THORACOSCOPIC THYMECTOMY;  Surgeon: Benjy Hull MD;  Location: [facility] OR       ALLERGIES  Nkda [no known drug allergies]    FAMILY HX:  Family History   Problem Relation Age of Onset     Coronary Artery Disease Mother 59     Valvular heart disease Father      Heart Failure Father      Heart Disease Maternal Grandmother      Heart Disease Maternal Grandfather      Thyroid Disease Sister   "      SOCIAL HX:  Social History     Tobacco Use     Smoking status: Never Smoker     Smokeless tobacco: Never Used   Substance Use Topics     Alcohol use: No     Drug use: No        ROS:  Comprehensive ROS is negative except as noted in HPI.    VITAL SIGNS:  /80 (BP Location: Right arm, Patient Position: Chair, Cuff Size: Adult Regular)   Pulse 65   Ht 1.715 m (5' 7.5\")   Wt 78.5 kg (173 lb)   SpO2 97%   BMI 26.70 kg/m    Body mass index is 26.7 kg/m .  Wt Readings from Last 2 Encounters:   07/09/21 78.5 kg (173 lb)   04/21/21 80.1 kg (176 lb 8 oz)       PHYSICAL EXAM  Gen: pleasant man sitting comfortably in NAD  Head: nc/at  Eyes: EOMI, PERRL  ENT: trachea is midline  Neck: supple, no lymphadenopathy  CV: nml s1/s2, no murmur or gallop; no JVD  Chest: clear lungs  Abd: soft, NT, NABS  Ext: warm, no LE edema  Skin: no rash on limited exam  Neuro: awake, alert, oriented, nml speech and gait  Vasc: 2+ bilateral carotid, brachial, radial, DP and PT pulses; no bruits noted    LABS: personally reviewed  CMP  Recent Labs   Lab Test 04/13/21  0155 03/24/21  0652 03/23/21  0700 03/22/21  1617 03/22/21  1028 03/17/21  1521 03/17/21  1521 06/27/17  1747 06/09/17  0718    136 136  --  139   < > 138  --  139   POTASSIUM 4.4 4.0 3.9  --  4.3   < > 4.5  --  3.9   CHLORIDE 107 105 105  --   --   --  105  --  106   CO2 27 26 28  --   --   --  27  --  26   ANIONGAP 4 5 3  --   --   --  5  --  7   GLC 86 85 90  --  166*   < > 82  --  84   BUN 16 14 13  --   --   --  24  --  17   CR 0.92 1.00 1.00 1.02  --   --  1.01  --  1.07   GFRESTIMATED >90 85 85 83  --   --  84  --  73   GFRESTBLACK >90 >90 >90 >90  --   --  >90  --  88   YOAN 8.3* 9.3 8.8  --   --   --  9.0  --  7.8*   MAG 2.2  --   --   --   --   --   --   --   --    PHOS 3.5  --   --   --   --   --   --   --   --    PROTTOTAL 7.6  --   --   --   --   --   --  8.1 6.3*   ALBUMIN 3.3*  --   --   --   --   --   --  4.4 3.4   BILITOTAL 0.2  --   --   --   --   " --   --  0.6 0.4   ALKPHOS 131  --   --   --   --   --   --  100 77   AST 24  --   --   --   --   --   --  24 17   ALT 43  --   --   --   --   --   --  36 21    < > = values in this interval not displayed.     CBC  Recent Labs   Lab Test 04/13/21  0155 03/24/21  0652 03/23/21  0700 03/22/21  1028 03/17/21  1521 03/17/21  1521   WBC 8.1 11.1* 8.3  --   --  5.6   RBC 4.23* 4.32* 4.37*  --   --  4.73   HGB 13.2* 13.8 13.5 13.9   < > 14.5   HCT 39.4* 41.3 41.1  --   --  43.6   MCV 93 96 94  --   --  92   MCH 31.2 31.9 30.9  --   --  30.7   MCHC 33.5 33.4 32.8  --   --  33.3   RDW 12.4 12.6 12.6  --   --  12.4    225 235  --   --  265    < > = values in this interval not displayed.     INRNo lab results found.  Recent Labs   Lab Test 10/14/20  1417 09/30/19  1132   CHOL 266* 247*   HDL 52 44   * 171*   TRIG 205* 159*     Recent Labs   Lab Test 10/14/20  1417 09/30/19  1132   A1C 5.2 5.4       Most recent EKG: reviewed and discussed with the patient  4/13/21: atrial bigeminy    Other Imaging: reviewed and discussed with the patient  4/13/21 CT Chest: no coronary artery calcifications        Please do not hesitate to contact me if you have any questions/concerns.     Sincerely,     Jose Elias Munoz MD

## 2021-07-09 NOTE — PATIENT INSTRUCTIONS
"You were seen today in the Cardiovascular Clinic at the [facility].     Cardiology Providers you saw during your visit: Dr. Jose Elias Munoz    Diagnosis:   Encounter Diagnosis   Name Primary?     Family history of coronary artery disease           Orders:   No orders of the defined types were placed in this encounter.      Recommendations:   1. No strong recommendation for starting lipid lowering therapy at this time.  2. Consider repeating coronary artery calcium scan in about 5 years.        Please feel free to call me with any questions or concerns.       Yanely FERRER LPN     Questions: [phone]  First press #1 for [facility] [employer] for \"Medical Questions\" to reach us Cardiology Nurses.   [phone]   First press #1 for the [employer] and then press #1     On Call Cardiologist for after hours or on weekends: [phone]   option #4 and ask to speak to the on-call Cardiologist.          If you need a medication refill please contact your pharmacy.  Please allow 3 business days for your refill to be completed.  --------------------------------------------------------------   "

## 2021-07-09 NOTE — NURSING NOTE
Chief Complaint   Patient presents with     New Patient      New Pt Visit - Prevention      Vitals were taken and medication reconciled.    DAPHNIE Ghosh  6:55 AM

## 2021-07-14 ENCOUNTER — OFFICE VISIT (OUTPATIENT)
Dept: NEUROLOGY | Facility: CLINIC | Age: 56
End: 2021-07-14
Payer: COMMERCIAL

## 2021-07-14 VITALS
DIASTOLIC BLOOD PRESSURE: 77 MMHG | OXYGEN SATURATION: 98 % | RESPIRATION RATE: 16 BRPM | BODY MASS INDEX: 27.31 KG/M2 | HEART RATE: 68 BPM | WEIGHT: 177 LBS | SYSTOLIC BLOOD PRESSURE: 128 MMHG

## 2021-07-14 DIAGNOSIS — G70.00 MYASTHENIA GRAVIS WITHOUT EXACERBATION (H): Primary | ICD-10-CM

## 2021-07-14 PROCEDURE — 99214 OFFICE O/P EST MOD 30 MIN: CPT | Performed by: PSYCHIATRY & NEUROLOGY

## 2021-07-14 ASSESSMENT — PAIN SCALES - GENERAL: PAINLEVEL: NO PAIN (0)

## 2021-07-14 NOTE — LETTER
7/14/2021       RE: Leonidas Pratt  [address]  [address]  [place] [address]     Dear Colleague,    Thank you for referring your patient, Leonidas Pratt, to the [facility] [place] [facility]. Please see a copy of my visit note below.    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 Onset was around 2010. First symptom was diplopia. About 3 years later ptosis started. No dysarthria, dysphagia, SOB, or weakness. In 2015 he was found to have ACHR ab. In 2015 he was diagnosed with MG and started on prednisone. He started prednisone 60 mg and tapered off over about 3 months. He was off immunotherapy in 2016, and remained off until 12/2018. At that end of 2018 he developed diplopia and restarted prednisone 60 mg daily. He tapered over a few months. By 2/20 he was taking prednisone 5 mg daily. This time prednisone was helpful to alleviate ptosis and diplopia. He remained on low dose prednisone (around 5 mg) through most of 2019. He was stable during that time. In April 2020 he then developed worsening ptosis and diplopia of both eyes. In 4/20 he increased prednisone to 40 mg daily. By June 2020 he was back down to 5 mg. In 9/20 he then experienced new weakness in his left hand. Typing was difficult. He also felt that his legs were weaker. There was a clear drop off in his typical level of activity and exercise. No dysarthria or dysphagia. In 10/20 prednisone increased to 40 mg daily. Prednisone tapered to 10mg daily in 01/21, and 5 mg by 3/21. He underwent thymectomy on 3/22/21.     Interval history:   I last saw him in clinic on 3/31/21. He has done fairly well. He still experiences some left sided ptosis. Sometimes his eye looks normal, but often in the afternoon ptosis develops. Diplopia also sometimes occurs after eye  strain, but not every day. He found that cheek puff was weak a few weeks ago (unable to whistle) but this has improved. No dysarthria, dysphagia, limb weakness or shortness of breath. He restarted mestinon 60 mg q D, which helps.     Prior pertinent laboratory work-up:  : Normal/negative Hba1c, TSH  4/15: ACHR ab binding 3.1 (N<0.4)    Prior electrophysiologic work-up:  12/10: RNS of the right facial nerves showed no abnormal decreament or increment.Single fiber EMG of the right frontalis muscle was subjectively well within the normal range (normal <1.69).  All individual pairs demonstrated normal jitter ranging from 13 ms to 47.2 ms with normal <53.5 ms in the frontalis.  The mean jitter of the 20 pairs in the right frontalis muscle was 22.9 ms (nl< 35.5ms). There was no transmission blocking. There is no electrophysiological evidence for neuromuscular junction disorder.  10/14/20: NCS/RNSshowed unequivocal decrement in facial-nasalis and ulnar-ADM.     Prior imagin/9/17 CT C/A/P showed no mediastinal mass  10/29/20: CT chest showed unchanged tiny soft tissue density in the anterior superior mediastinum, presumably residual thymus. No enlargement to suggest Thymoma.    Past Medical History:   Past Medical History:   Diagnosis Date     Asthma      Kidney stone      Myasthenia gravis (H)      Strabismus      Thyroid disease      Past Surgical History:  Past Surgical History:   Procedure Laterality Date     HERNIA REPAIR       LASER HOLMIUM LITHOTRIPSY URETER(S), INSERT STENT, COMBINED Right 10/15/2014    Procedure: COMBINED CYSTOSCOPY, URETEROSCOPY, LASER HOLMIUM LITHOTRIPSY URETER(S), INSERT STENT;  Surgeon: Thong Bates MD;  Location: [facility] OR     RECESSION RESECTION WITH ADJUSTABLE SUTURE  2012    LLRc 5.0mm on adj. LIRc 4.5mm on adj.     RECESSION RESECTION WITH ADJUSTABLE SUTURE BILATERAL  2012    RSRc 3.0mm; adj. suture     STRABISMUS SURGERY       THORACOSCOPIC THYMECTOMY N/A  3/22/2021    Procedure: SUBXIPHOID BILATERAL THORACOSCOPIC THYMECTOMY;  Surgeon: Benjy Hull MD;  Location:  OR     Family history:    There is no known family history of myopathy or other neuromuscular disorders. He does have a sister with spasmodic dysphonia.     Social History:    He denies tobacco, alcohol, or illicit drug use.     Medical Allergies:    Allergies   Allergen Reactions     Nkda [No Known Drug Allergies]      Current Medications:    Current Outpatient Medications   Medication     albuterol (PROAIR HFA/PROVENTIL HFA/VENTOLIN HFA) 108 (90 BASE) MCG/ACT Inhaler     levothyroxine (SYNTHROID/LEVOTHROID) 150 MCG tablet     predniSONE (DELTASONE) 5 MG tablet     pyridostigmine (MESTINON) 60 MG tablet     Vitamin D, Cholecalciferol, 25 MCG (1000 UT) TABS     No current facility-administered medications for this visit.     Review of Systems: A review of systems was obtained and was negative except for what was noted above.    Physical examination:    /77   Pulse 68   Resp 16   Wt 80.3 kg (177 lb)   SpO2 98%   BMI 27.31 kg/m      General Appearance: NAD    Skin: There are no rashes or other skin lesions.     Neurologic examination:    Mental status:  Patient is alert, attentive, and oriented x 3.  Language is coherent and fluent without aphasia.  Memory, comprehension and ability to follow commands were intact.       Cranial nerves:  No ptosis at baseline. Eye closure strong. No diplopia at mid position. After about 30 seconds of upgaze the left eyelid droops slightly. Symmetric smile. Eye and lip closure strong. No dysarthria.      Motor exam: No atrophy or fasciculations. Manual muscle testing revealed the following MRC grade muscle power:   Right Left   Neck flexion 5 5   Neck extension: 5 5   Shoulder ext rotation 5 5   Shoulder abduction:  5 5   Elbow extension: 5 5   Elbow flexion:  5 5   Wrist flexion:  5 5   Wrist extension:  5 5   Finger flexion 5 5   Finger extension 5 5    FDI 5 5   Hip flexion 5 5   Knee flexion 5 5   Knee extension 5 5   Dorsiflexion 5 5   Plantar flexion 5 5      MG Outcomes MG-ADL QMG MG-Composite  strength (kg) MG medications   10/12/20 7 Not tested 9 Right 35, Left 31 Pred 5 mg daily   11/18/20 5 Not tested 6 Right 40, Left 39 Pred 25 mg daily   1/20/21 4 Not tested 2 Right 41, Left 41 Prednisone 10mg daily   3/31/21   5 Not tested 9 Right 44, Left 46 Thymectomy 3/22/21, IVIG prior to thymectomy 2g/kg over 3 days (03/15-17/2021),  Prednisone 5mg daily   7/14/21 3 Not tested 4 Right 43, Left 44 Pred 5 mg     Assessment:    Leonidas Pratt is a 56 year old man with ACHR ab positive generalized non-thymomatous myasthenia gravis s/p thymectomy 3/22/21. He is currently stable with minimal manifestations. At this time there is no indication for a steroid sparing medication or IVIG. He knows to call us ASAP if any new symptoms develop.     Plan:      1. Immunotherapy:   - Prednisone: Continue 5 mg daily for now.  If he remains stable then he may start prednisone weaning to 5/2.5mg alternating days in August or September. If stable after at least 2 weeks he can go down to 2.5 mg daily.   - No indication for steroid sparing agent at this time  - No role for IVIG at this time  2. Thymectomy: Completed 3/22/21. Benefits from thymectomy may take 1-3 years to be fully appreciated. Appreciate collateral care with Dr. Hull.   3. Supportive care:  - Mestinon: Continue 60 mg q D. He may try 1/2 pill to improve tolerability.   4. Potential MG triggers including heat, surgery, and illness. Certain medications and over the counter preparations may also cause worsening of MG symptoms. A list of cautionary medications can be found at: [email or web address]-Drugs  5. Follow up 3-4 months. Sooner if needed.    -----            Again, thank you for allowing me to participate in the care of your patient.      Sincerely,    Rj Jackson,  MD

## 2021-07-14 NOTE — PROGRESS NOTES
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 Onset was around 2010. First symptom was diplopia. About 3 years later ptosis started. No dysarthria, dysphagia, SOB, or weakness. In 2015 he was found to have ACHR ab. In 2015 he was diagnosed with MG and started on prednisone. He started prednisone 60 mg and tapered off over about 3 months. He was off immunotherapy in 2016, and remained off until 12/2018. At that end of 2018 he developed diplopia and restarted prednisone 60 mg daily. He tapered over a few months. By 2/20 he was taking prednisone 5 mg daily. This time prednisone was helpful to alleviate ptosis and diplopia. He remained on low dose prednisone (around 5 mg) through most of 2019. He was stable during that time. In April 2020 he then developed worsening ptosis and diplopia of both eyes. In 4/20 he increased prednisone to 40 mg daily. By June 2020 he was back down to 5 mg. In 9/20 he then experienced new weakness in his left hand. Typing was difficult. He also felt that his legs were weaker. There was a clear drop off in his typical level of activity and exercise. No dysarthria or dysphagia. In 10/20 prednisone increased to 40 mg daily. Prednisone tapered to 10mg daily in 01/21, and 5 mg by 3/21. He underwent thymectomy on 3/22/21.     Interval history:   I last saw him in clinic on 3/31/21. He has done fairly well. He still experiences some left sided ptosis. Sometimes his eye looks normal, but often in the afternoon ptosis develops. Diplopia also sometimes occurs after eye strain, but not every day. He found that cheek puff was weak a few weeks ago (unable to whistle) but this has improved. No dysarthria, dysphagia, limb weakness or shortness of breath. He restarted mestinon 60 mg q D, which helps.     Prior pertinent laboratory work-up:  9/19: Normal/negative Hba1c, TSH  4/15: ACHR ab binding 3.1  (N<0.4)    Prior electrophysiologic work-up:  12/10: RNS of the right facial nerves showed no abnormal decreament or increment.Single fiber EMG of the right frontalis muscle was subjectively well within the normal range (normal <1.69).  All individual pairs demonstrated normal jitter ranging from 13 ms to 47.2 ms with normal <53.5 ms in the frontalis.  The mean jitter of the 20 pairs in the right frontalis muscle was 22.9 ms (nl< 35.5ms). There was no transmission blocking. There is no electrophysiological evidence for neuromuscular junction disorder.  10/14/20: NCS/RNSshowed unequivocal decrement in facial-nasalis and ulnar-ADM.     Prior imagin/9/17 CT C/A/P showed no mediastinal mass  10/29/20: CT chest showed unchanged tiny soft tissue density in the anterior superior mediastinum, presumably residual thymus. No enlargement to suggest Thymoma.    Past Medical History:   Past Medical History:   Diagnosis Date     Asthma      Kidney stone      Myasthenia gravis (H)      Strabismus      Thyroid disease      Past Surgical History:  Past Surgical History:   Procedure Laterality Date     HERNIA REPAIR       LASER HOLMIUM LITHOTRIPSY URETER(S), INSERT STENT, COMBINED Right 10/15/2014    Procedure: COMBINED CYSTOSCOPY, URETEROSCOPY, LASER HOLMIUM LITHOTRIPSY URETER(S), INSERT STENT;  Surgeon: Thong Bates MD;  Location: [facility] OR     RECESSION RESECTION WITH ADJUSTABLE SUTURE  2012    LLRc 5.0mm on adj. LIRc 4.5mm on adj.     RECESSION RESECTION WITH ADJUSTABLE SUTURE BILATERAL  2012    RSRc 3.0mm; adj. suture     STRABISMUS SURGERY       THORACOSCOPIC THYMECTOMY N/A 3/22/2021    Procedure: SUBXIPHOID BILATERAL THORACOSCOPIC THYMECTOMY;  Surgeon: Benjy Hull MD;  Location: [facility] OR     Family history:    There is no known family history of myopathy or other neuromuscular disorders. He does have a sister with spasmodic dysphonia.     Social History:    He denies tobacco, alcohol, or illicit  drug use.     Medical Allergies:    Allergies   Allergen Reactions     Nkda [No Known Drug Allergies]      Current Medications:    Current Outpatient Medications   Medication     albuterol (PROAIR HFA/PROVENTIL HFA/VENTOLIN HFA) 108 (90 BASE) MCG/ACT Inhaler     levothyroxine (SYNTHROID/LEVOTHROID) 150 MCG tablet     predniSONE (DELTASONE) 5 MG tablet     pyridostigmine (MESTINON) 60 MG tablet     Vitamin D, Cholecalciferol, 25 MCG (1000 UT) TABS     No current facility-administered medications for this visit.     Review of Systems: A review of systems was obtained and was negative except for what was noted above.    Physical examination:    /77   Pulse 68   Resp 16   Wt 80.3 kg (177 lb)   SpO2 98%   BMI 27.31 kg/m      General Appearance: NAD    Skin: There are no rashes or other skin lesions.     Neurologic examination:    Mental status:  Patient is alert, attentive, and oriented x 3.  Language is coherent and fluent without aphasia.  Memory, comprehension and ability to follow commands were intact.       Cranial nerves:  No ptosis at baseline. Eye closure strong. No diplopia at mid position. After about 30 seconds of upgaze the left eyelid droops slightly. Symmetric smile. Eye and lip closure strong. No dysarthria.      Motor exam: No atrophy or fasciculations. Manual muscle testing revealed the following MRC grade muscle power:   Right Left   Neck flexion 5 5   Neck extension: 5 5   Shoulder ext rotation 5 5   Shoulder abduction:  5 5   Elbow extension: 5 5   Elbow flexion:  5 5   Wrist flexion:  5 5   Wrist extension:  5 5   Finger flexion 5 5   Finger extension 5 5   FDI 5 5   Hip flexion 5 5   Knee flexion 5 5   Knee extension 5 5   Dorsiflexion 5 5   Plantar flexion 5 5      MG Outcomes MG-ADL QMG MG-Composite  strength (kg) MG medications   10/12/20 7 Not tested 9 Right 35, Left 31 Pred 5 mg daily   11/18/20 5 Not tested 6 Right 40, Left 39 Pred 25 mg daily   1/20/21 4 Not tested 2 Right 41,  Left 41 Prednisone 10mg daily   3/31/21   5 Not tested 9 Right 44, Left 46 Thymectomy 3/22/21, IVIG prior to thymectomy 2g/kg over 3 days (03/15-17/2021),  Prednisone 5mg daily   7/14/21 3 Not tested 4 Right 43, Left 44 Pred 5 mg     Assessment:    Leonidas Pratt is a 56 year old man with ACHR ab positive generalized non-thymomatous myasthenia gravis s/p thymectomy 3/22/21. He is currently stable with minimal manifestations. At this time there is no indication for a steroid sparing medication or IVIG. He knows to call us ASAP if any new symptoms develop.     Plan:      1. Immunotherapy:   - Prednisone: Continue 5 mg daily for now.  If he remains stable then he may start prednisone weaning to 5/2.5mg alternating days in August or September. If stable after at least 2 weeks he can go down to 2.5 mg daily.   - No indication for steroid sparing agent at this time  - No role for IVIG at this time  2. Thymectomy: Completed 3/22/21. Benefits from thymectomy may take 1-3 years to be fully appreciated. Appreciate collateral care with Dr. Hull.   3. Supportive care:  - Mestinon: Continue 60 mg q D. He may try 1/2 pill to improve tolerability.   4. Potential MG triggers including heat, surgery, and illness. Certain medications and over the counter preparations may also cause worsening of MG symptoms. A list of cautionary medications can be found at: [email or web address]-[email or web address]-Drugs  5. Follow up 3-4 months. Sooner if needed.    -----

## 2021-07-22 NOTE — TELEPHONE ENCOUNTER
RN Assessment/Reason for Call:   Okay to leave Detailed Message  Calling in, 2 days chills and cough, on Prednisone.  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  Fever,no thermometer chills, cough- Thursday.  Hx asthma using inhalers.  Call back if worse symptoms  Discussed home care measures.  Agrees to plan.     RN Action/Disposition:  Protocol recommends see Dr in 24 hrs.  Offered [facility] MOA open 8p.  Prefers Coronavirus testing. Given Onctenfarms.Shopmium did 9a.  MP[facility] will call him back    Sofi Craft RN    [facility] Triage/med refill  3/14/2020  3:59 PM          Reason for Disposition    [1] Dry cough occurs AND [2] onset > 14 days after CORONAVIRUS EXPOSURE    [1] Fever or feeling feverish AND [2] within 14 Days of CORONAVIRUS EXPOSURE    [1] Continuous (nonstop) coughing interferes with work or school AND [2] no improvement using cough treatment per protocol    Protocols used: CORONAVIRUS (2019-NCOV) EXPOSURE-A-AH, COUGH - ACUTE NON-PRODUCTIVE-A-AH

## 2021-10-18 DIAGNOSIS — G70.00 OCULAR MYASTHENIA (H): ICD-10-CM

## 2021-10-18 RX ORDER — APRACLONIDINE HYDROCHLORIDE 5 MG/ML
1 SOLUTION/ DROPS OPHTHALMIC 3 TIMES DAILY PRN
Qty: 5 ML | Refills: 0 | Status: SHIPPED | OUTPATIENT
Start: 2021-10-18

## 2021-10-24 ENCOUNTER — HEALTH MAINTENANCE LETTER (OUTPATIENT)
Age: 56
End: 2021-10-24

## 2021-10-27 DIAGNOSIS — G70.00 MYASTHENIA GRAVIS WITHOUT EXACERBATION (H): Primary | ICD-10-CM

## 2021-10-28 RX ORDER — PYRIDOSTIGMINE BROMIDE 60 MG/1
TABLET ORAL
Qty: 90 TABLET | Refills: 3 | Status: SHIPPED | OUTPATIENT
Start: 2021-10-28

## 2021-12-14 ENCOUNTER — MYC MEDICAL ADVICE (OUTPATIENT)
Dept: NEUROLOGY | Facility: CLINIC | Age: 56
End: 2021-12-14
Payer: COMMERCIAL

## 2021-12-14 DIAGNOSIS — G70.00 OCULAR MYASTHENIA (H): ICD-10-CM

## 2021-12-14 RX ORDER — PREDNISONE 5 MG/1
TABLET ORAL
Qty: 30 TABLET | Refills: 2 | Status: SHIPPED | OUTPATIENT
Start: 2021-12-14

## 2022-01-12 DIAGNOSIS — E03.9 HYPOTHYROIDISM, UNSPECIFIED TYPE: Primary | ICD-10-CM

## 2022-01-16 PROCEDURE — U0003 INFECTIOUS AGENT DETECTION BY NUCLEIC ACID (DNA OR RNA); SEVERE ACUTE RESPIRATORY SYNDROME CORONAVIRUS 2 (SARS-COV-2) (CORONAVIRUS DISEASE [COVID-19]), AMPLIFIED PROBE TECHNIQUE, MAKING USE OF HIGH THROUGHPUT TECHNOLOGIES AS DESCRIBED BY CMS-2020-01-R: HCPCS | Performed by: INTERNAL MEDICINE

## 2022-01-17 ENCOUNTER — LAB REQUISITION (OUTPATIENT)
Dept: LAB | Facility: CLINIC | Age: 57
End: 2022-01-17

## 2022-01-17 LAB — SARS-COV-2 RNA RESP QL NAA+PROBE: NEGATIVE

## 2022-01-17 NOTE — TELEPHONE ENCOUNTER
levothyroxine (SYNTHROID/LEVOTHROID) 150 MCG tablet      Last Written Prescription Date:  *Historical      Last Office Visit : 4/14/2017  Future Office visit:  none    Routing refill request to provider for review/approval because:  Failed medication protocol: appointment and labs  - Creatinine, Potassium, Sodium labs due  - message also routed to [facility] scheduling

## 2022-01-18 RX ORDER — LEVOTHYROXINE SODIUM 150 UG/1
150 TABLET ORAL DAILY
Qty: 90 TABLET | Refills: 1 | Status: SHIPPED | OUTPATIENT
Start: 2022-01-18 | End: 2022-07-22

## 2022-02-13 ENCOUNTER — HEALTH MAINTENANCE LETTER (OUTPATIENT)
Age: 57
End: 2022-02-13

## 2022-03-14 ENCOUNTER — VIRTUAL VISIT (OUTPATIENT)
Dept: NEUROLOGY | Facility: CLINIC | Age: 57
End: 2022-03-14
Payer: COMMERCIAL

## 2022-03-14 DIAGNOSIS — G70.00 MYASTHENIA GRAVIS WITHOUT EXACERBATION (H): Primary | ICD-10-CM

## 2022-03-14 PROCEDURE — 99214 OFFICE O/P EST MOD 30 MIN: CPT | Mod: 95 | Performed by: PSYCHIATRY & NEUROLOGY

## 2022-03-14 RX ORDER — PREDNISONE 1 MG/1
1 TABLET ORAL DAILY
Qty: 120 TABLET | Refills: 3 | Status: SHIPPED | OUTPATIENT
Start: 2022-03-14 | End: 2022-06-28

## 2022-03-14 RX ORDER — PYRIDOSTIGMINE BROMIDE 60 MG/1
60 TABLET ORAL 2 TIMES DAILY
Qty: 60 TABLET | Refills: 5 | Status: SHIPPED | OUTPATIENT
Start: 2022-03-14 | End: 2023-03-28

## 2022-03-14 RX ORDER — PREDNISONE 5 MG/1
5 TABLET ORAL DAILY
Qty: 30 TABLET | Refills: 5 | Status: SHIPPED | OUTPATIENT
Start: 2022-03-14

## 2022-03-14 NOTE — LETTER
3/14/2022       RE: Leonidas Pratt  [address]  [address]  [place] [address]     Dear Colleague,    Thank you for referring your patient, Leonidas Pratt, to the [facility] [place] at [facility]. Please see a copy of my visit note below.       Chief Complaint   Patient presents with     RECHECK     RETURN MYASTHENIA GRAVIS      See my separate note from today's date for documentation of the virtual visit.     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 Onset was around 2010. First symptom was diplopia. About 3 years later ptosis started. No dysarthria, dysphagia, SOB, or weakness. In 2015 he was found to have ACHR ab. In 2015 he was diagnosed with MG and started on prednisone. He started prednisone 60 mg and tapered off over about 3 months. He was off immunotherapy in 2016, and remained off until 12/2018. At that end of 2018 he developed diplopia and restarted prednisone 60 mg daily. He tapered over a few months. By 2/20 he was taking prednisone 5 mg daily. This time prednisone was helpful to alleviate ptosis and diplopia. He remained on low dose prednisone (around 5 mg) through most of 2019. He was stable during that time. In April 2020 he then developed worsening ptosis and diplopia of both eyes. In 4/20 he increased prednisone to 40 mg daily. By June 2020 he was back down to 5 mg. In 9/20 he then experienced new weakness in his left hand. Typing was difficult. He also felt that his legs were weaker. There was a clear drop off in his typical level of activity and exercise. No dysarthria or dysphagia. In 10/20 prednisone increased to 40 mg daily. Prednisone tapered to 10mg daily in 01/21, and 5 mg by 3/21. He underwent thymectomy on 3/22/21. In late 2021 and early 2022 he  made several attempts to reduce prednisone below 5, each time unsuccessful.    Interval history:   I last saw him in clinic on 21. He continues prednisone 5 mg daily and mestinon. He takes mestinon (3/4th pill) at 5 am and noon. He has tried to reduce prednisone by slowly dropping to 2.5/5 mg alternating but has found that each time ptosis and diplopia worsens when he gets to 2.5 mg daily. Even at 5 mg he still has some ptosis and rare diplopia. Mestinon helps, although is not well tolerated at the higher (60 mg) dose. No dysarthria, dysphagia, limb weakness or shortness of breath. He exercises almost daily without limitation.     Prior pertinent laboratory work-up:  : Normal/negative Hba1c, TSH  4/15: ACHR ab binding 3.1 (N<0.4)    Prior electrophysiologic work-up:  12/10: RNS of the right facial nerves showed no abnormal decreament or increment.Single fiber EMG of the right frontalis muscle was subjectively well within the normal range (normal <1.69).  All individual pairs demonstrated normal jitter ranging from 13 ms to 47.2 ms with normal <53.5 ms in the frontalis.  The mean jitter of the 20 pairs in the right frontalis muscle was 22.9 ms (nl< 35.5ms). There was no transmission blocking. There is no electrophysiological evidence for neuromuscular junction disorder.  10/14/20: NCS/RNSshowed unequivocal decrement in facial-nasalis and ulnar-ADM.     Prior imagin/9/17 CT C/A/P showed no mediastinal mass  10/29/20: CT chest showed unchanged tiny soft tissue density in the anterior superior mediastinum, presumably residual thymus. No enlargement to suggest Thymoma.    Past Medical History:   Past Medical History:   Diagnosis Date     Asthma      Kidney stone      Myasthenia gravis (H)      Strabismus      Thyroid disease      Past Surgical History:  Past Surgical History:   Procedure Laterality Date     HERNIA REPAIR       LASER HOLMIUM LITHOTRIPSY URETER(S), INSERT STENT, COMBINED Right 10/15/2014     Procedure: COMBINED CYSTOSCOPY, URETEROSCOPY, LASER HOLMIUM LITHOTRIPSY URETER(S), INSERT STENT;  Surgeon: Thong Bates MD;  [facility]: [facility]     RECESSION RESECTION WITH ADJUSTABLE SUTURE  9/4/2012    LLRc 5.0mm on adj. LIRc 4.5mm on adj.     RECESSION RESECTION WITH ADJUSTABLE SUTURE BILATERAL  12/18/2012    RSRc 3.0mm; adj. suture     STRABISMUS SURGERY       THORACOSCOPIC THYMECTOMY N/A 3/22/2021    Procedure: SUBXIPHOID BILATERAL THORACOSCOPIC THYMECTOMY;  Surgeon: Benjy Hull MD;  Location: [facility] OR     Family history:    There is no known family history of myopathy or other neuromuscular disorders. He does have a sister with spasmodic dysphonia.     Social History:    He denies tobacco, alcohol, or illicit drug use.     Medical Allergies:    Allergies   Allergen Reactions     Nkda [No Known Drug Allergies]      Current Medications:    Current Outpatient Medications   Medication     albuterol (PROAIR HFA/PROVENTIL HFA/VENTOLIN HFA) 108 (90 BASE) MCG/ACT Inhaler     apraclonidine (IOPIDINE) 0.5 % ophthalmic solution     levothyroxine (SYNTHROID/LEVOTHROID) 150 MCG tablet     predniSONE (DELTASONE) 5 MG tablet     pyridostigmine (MESTINON) 60 MG tablet     Vitamin D, Cholecalciferol, 25 MCG (1000 UT) TABS     No current facility-administered medications for this visit.     Review of Systems: A review of systems was obtained and was negative except for what was noted above.    Physical examination:    General Appearance: NAD     Neurologic examination:    Mental status:  Patient is alert, attentive, and oriented x 3.  Language is coherent and fluent without aphasia.  Memory, comprehension and ability to follow commands were intact.       Cranial nerves:  There is some very mild left eyelid ptosis observed on video. No diplopia. No dysarthria.      Motor exam: Not tested on video     MG Outcomes MG-ADL MG-Composite  strength (kg) MG medications   10/12/20 7 9 Right 35, Left 31 Pred 5 mg  daily   11/18/20 5 6 Right 40, Left 39 Pred 25 mg daily   1/20/21 4 2 Right 41, Left 41 Prednisone 10mg daily   3/31/21   5 9 Right 44, Left 46 Thymectomy 3/22/21, IVIG prior to thymectomy 2g/kg over 3 days (03/15-17/2021),  Prednisone 5mg daily   7/14/21 3 4 Right 43, Left 44 Pred 5 mg   3/14/22 3 Not tested (video) Not tested (video) Pred 5 mg     Assessment:    Leonidas Pratt is a 57 year old man with ACHR ab positive generalized non-thymomatous myasthenia gravis now 1 year out from thymectomy (3/22/21). Manifestations are presently minimal, but he remains dependant on low dose prednisone. We agreed to make no changes in the prednisone plan today. Although a reduction at this point is unlikely to succeed, I remain optimistic that we can reduce prednisone further as we get farther out from thymectomy.      Plan:      1. Immunotherapy:   - Prednisone: Continue 5 mg daily for now.  In about 3 months (June 2022) start weaning again by reducing to 4 mg daily x 2-3 weeks, then 3 mg daily x 2-3 weeks, then continue reductions by 1 mg every 2-3 weeks as tolerated.   - No indication for steroid sparing agent at this time  - No role for IVIG at this time  2. Thymectomy: Completed 3/22/21. Benefits from thymectomy may take 1-3 years to be fully appreciated. Appreciate collateral care with Dr. Hull.   3. Supportive care:  - Mestinon: Continue 60 mg (he takes about 3/4th pill to improve tolerability) in morning and mid day.   4. Potential MG triggers including heat, surgery, and illness. Certain medications and over the counter preparations may also cause worsening of MG symptoms. A list of cautionary medications can be found at: [email or web address]-[email or web address]-MG/MG-Management/[email or web address]-Drugs  5. Follow up 3-4 months. Sooner if needed.    -----        Rj Jackson MD

## 2022-03-14 NOTE — PATIENT INSTRUCTIONS
Prednisone: No changes for now. In May or June lets try to wean again. Reduce from 5 mg daily to 4 mg daily for at least 2 weeks, although it would be ok to wait 3 or 4 weeks. Then reduce to 3 mg daily for another 2-4 weeks. Continue to reduce by 1 mg every 2-4 weeks as tolerated. The slower we taper the more likely we are to succeed.

## 2022-03-14 NOTE — PROGRESS NOTES
nieves is a 57 year old who is being evaluated via a billable video visit.      Unable to contact patient     How would you like to obtain your AVS? MyChart  If the video visit is dropped, the invitation should be resent by: phone  Will anyone else be joining your video visit? No      Video Start Time: 8:35 am  Video-Visit Details    Type of service:  Video Visit    Video End Time:8:47 am    Originating Location (pt. Location): Other work    Distant Location (provider location):  [facility] NEUROLOGY [facility] [place]     Platform used for Video Visit: Delmy    Chief Complaint   Patient presents with     RECHECK     RETURN MYASTHENIA GRAVIS      See my separate note from today's date for documentation of the virtual visit.     Rj Jackson MD  Department of Neurology

## 2022-03-14 NOTE — PROGRESS NOTES
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 Onset was around 2010. First symptom was diplopia. About 3 years later ptosis started. No dysarthria, dysphagia, SOB, or weakness. In 2015 he was found to have ACHR ab. In 2015 he was diagnosed with MG and started on prednisone. He started prednisone 60 mg and tapered off over about 3 months. He was off immunotherapy in 2016, and remained off until 12/2018. At that end of 2018 he developed diplopia and restarted prednisone 60 mg daily. He tapered over a few months. By 2/20 he was taking prednisone 5 mg daily. This time prednisone was helpful to alleviate ptosis and diplopia. He remained on low dose prednisone (around 5 mg) through most of 2019. He was stable during that time. In April 2020 he then developed worsening ptosis and diplopia of both eyes. In 4/20 he increased prednisone to 40 mg daily. By June 2020 he was back down to 5 mg. In 9/20 he then experienced new weakness in his left hand. Typing was difficult. He also felt that his legs were weaker. There was a clear drop off in his typical level of activity and exercise. No dysarthria or dysphagia. In 10/20 prednisone increased to 40 mg daily. Prednisone tapered to 10mg daily in 01/21, and 5 mg by 3/21. He underwent thymectomy on 3/22/21. In late 2021 and early 2022 he made several attempts to reduce prednisone below 5, each time unsuccessful.    Interval history:   I last saw him in clinic on 7/14/21. He continues prednisone 5 mg daily and mestinon. He takes mestinon (3/4th pill) at 5 am and noon. He has tried to reduce prednisone by slowly dropping to 2.5/5 mg alternating but has found that each time ptosis and diplopia worsens when he gets to 2.5 mg daily. Even at 5 mg he still has some ptosis and rare diplopia. Mestinon helps, although is not well tolerated at the higher (60 mg) dose. No dysarthria,  dysphagia, limb weakness or shortness of breath. He exercises almost daily without limitation.     Prior pertinent laboratory work-up:  : Normal/negative Hba1c, TSH  4/15: ACHR ab binding 3.1 (N<0.4)    Prior electrophysiologic work-up:  12/10: RNS of the right facial nerves showed no abnormal decreament or increment.Single fiber EMG of the right frontalis muscle was subjectively well within the normal range (normal <1.69).  All individual pairs demonstrated normal jitter ranging from 13 ms to 47.2 ms with normal <53.5 ms in the frontalis.  The mean jitter of the 20 pairs in the right frontalis muscle was 22.9 ms (nl< 35.5ms). There was no transmission blocking. There is no electrophysiological evidence for neuromuscular junction disorder.  10/14/20: NCS/RNSshowed unequivocal decrement in facial-nasalis and ulnar-ADM.     Prior imagin/9/17 CT C/A/P showed no mediastinal mass  10/29/20: CT chest showed unchanged tiny soft tissue density in the anterior superior mediastinum, presumably residual thymus. No enlargement to suggest Thymoma.    Past Medical History:   Past Medical History:   Diagnosis Date     Asthma      Kidney stone      Myasthenia gravis (H)      Strabismus      Thyroid disease      Past Surgical History:  Past Surgical History:   Procedure Laterality Date     HERNIA REPAIR       LASER HOLMIUM LITHOTRIPSY URETER(S), INSERT STENT, COMBINED Right 10/15/2014    Procedure: COMBINED CYSTOSCOPY, URETEROSCOPY, LASER HOLMIUM LITHOTRIPSY URETER(S), INSERT STENT;  Surgeon: Thong Bates MD;  [facility] OR     RECESSION RESECTION WITH ADJUSTABLE SUTURE  2012    LLRc 5.0mm on adj. LIRc 4.5mm on adj.     RECESSION RESECTION WITH ADJUSTABLE SUTURE BILATERAL  2012    RSRc 3.0mm; adj. suture     STRABISMUS SURGERY       THORACOSCOPIC THYMECTOMY N/A 3/22/2021    Procedure: SUBXIPHOID BILATERAL THORACOSCOPIC THYMECTOMY;  Surgeon: Benjy Hull MD;  Location: [facility] OR     Family  history:    There is no known family history of myopathy or other neuromuscular disorders. He does have a sister with spasmodic dysphonia.     Social History:    He denies tobacco, alcohol, or illicit drug use.     Medical Allergies:    Allergies   Allergen Reactions     Nkda [No Known Drug Allergies]      Current Medications:    Current Outpatient Medications   Medication     albuterol (PROAIR HFA/PROVENTIL HFA/VENTOLIN HFA) 108 (90 BASE) MCG/ACT Inhaler     apraclonidine (IOPIDINE) 0.5 % ophthalmic solution     levothyroxine (SYNTHROID/LEVOTHROID) 150 MCG tablet     predniSONE (DELTASONE) 5 MG tablet     pyridostigmine (MESTINON) 60 MG tablet     Vitamin D, Cholecalciferol, 25 MCG (1000 UT) TABS     No current facility-administered medications for this visit.     Review of Systems: A review of systems was obtained and was negative except for what was noted above.    Physical examination:    General Appearance: NAD     Neurologic examination:    Mental status:  Patient is alert, attentive, and oriented x 3.  Language is coherent and fluent without aphasia.  Memory, comprehension and ability to follow commands were intact.       Cranial nerves:  There is some very mild left eyelid ptosis observed on video. No diplopia. No dysarthria.      Motor exam: Not tested on video     MG Outcomes MG-ADL MG-Composite  strength (kg) MG medications   10/12/20 7 9 Right 35, Left 31 Pred 5 mg daily   11/18/20 5 6 Right 40, Left 39 Pred 25 mg daily   1/20/21 4 2 Right 41, Left 41 Prednisone 10mg daily   3/31/21   5 9 Right 44, Left 46 Thymectomy 3/22/21, IVIG prior to thymectomy 2g/kg over 3 days (03/15-17/2021),  Prednisone 5mg daily   7/14/21 3 4 Right 43, Left 44 Pred 5 mg   3/14/22 3 Not tested (video) Not tested (video) Pred 5 mg     Assessment:    Leonidas Pratt is a 57 year old man with ACHR ab positive generalized non-thymomatous myasthenia gravis now 1 year out from thymectomy (3/22/21). Manifestations are presently  minimal, but he remains dependant on low dose prednisone. We agreed to make no changes in the prednisone plan today. Although a reduction at this point is unlikely to succeed, I remain optimistic that we can reduce prednisone further as we get farther out from thymectomy.      Plan:      1. Immunotherapy:   - Prednisone: Continue 5 mg daily for now.  In about 3 months (June 2022) start weaning again by reducing to 4 mg daily x 2-3 weeks, then 3 mg daily x 2-3 weeks, then continue reductions by 1 mg every 2-3 weeks as tolerated.   - No indication for steroid sparing agent at this time  - No role for IVIG at this time  2. Thymectomy: Completed 3/22/21. Benefits from thymectomy may take 1-3 years to be fully appreciated. Appreciate collateral care with Dr. Hull.   3. Supportive care:  - Mestinon: Continue 60 mg (he takes about 3/4th pill to improve tolerability) in morning and mid day.   4. Potential MG triggers including heat, surgery, and illness. Certain medications and over the counter preparations may also cause worsening of MG symptoms. A list of cautionary medications can be found at: [email or web address].[email or web address]-is-MG/MG-Management/Cautionary-Drugs  5. Follow up 3-4 months. Sooner if needed.    -----    2/6/23: Note from Dr. Pratt. He reports increased ptosis, facial weakness and limb weakness recently. He has a recent -2.4 on DEXA. Will start pred 20 mg daily now but anticipate restarting IVIG. Chronic pred dosing is not attractive. Vyvgart may be an option as well. Will see him in clinci in 2 days to discuss.

## 2022-06-28 DIAGNOSIS — G70.00 MYASTHENIA GRAVIS WITHOUT EXACERBATION (H): ICD-10-CM

## 2022-06-28 RX ORDER — PREDNISONE 1 MG/1
1 TABLET ORAL SEE ADMIN INSTRUCTIONS
Qty: 120 TABLET | Refills: 3 | Status: SHIPPED | OUTPATIENT
Start: 2022-06-28

## 2022-07-20 DIAGNOSIS — E03.9 HYPOTHYROIDISM, UNSPECIFIED TYPE: ICD-10-CM

## 2022-07-22 RX ORDER — LEVOTHYROXINE SODIUM 150 UG/1
150 TABLET ORAL DAILY
Qty: 90 TABLET | Refills: 3 | Status: SHIPPED | OUTPATIENT
Start: 2022-07-22

## 2022-07-22 NOTE — TELEPHONE ENCOUNTER
levothyroxine (SYNTHROID/LEVOTHROID) 150 MCG tablet    Last Written Prescription Date:   1/18/2022  Last Fill Quantity: 90,   # refills: 1  Last Office Visit :  4/14/2017  Future Office visit:  None    Routing refill request to provider for review/approval because:  Second request  Needing updated office visit and labs  Please call Pt to schedule.     Ryann Armendariz RN  Central Triage Red Flags/Med Refills

## 2022-10-16 ENCOUNTER — HEALTH MAINTENANCE LETTER (OUTPATIENT)
Age: 57
End: 2022-10-16

## 2022-10-28 ENCOUNTER — LAB (OUTPATIENT)
Dept: LAB | Facility: CLINIC | Age: 57
End: 2022-10-28
Payer: COMMERCIAL

## 2022-10-28 ENCOUNTER — TELEPHONE (OUTPATIENT)
Dept: GASTROENTEROLOGY | Facility: CLINIC | Age: 57
End: 2022-10-28

## 2022-10-28 DIAGNOSIS — Z00.00 ENCOUNTER FOR ROUTINE ADULT HEALTH EXAMINATION: ICD-10-CM

## 2022-10-28 DIAGNOSIS — E78.00 HIGH CHOLESTEROL: ICD-10-CM

## 2022-10-28 DIAGNOSIS — E03.9 HYPOTHYROIDISM: ICD-10-CM

## 2022-10-28 DIAGNOSIS — D64.9 ANEMIA: ICD-10-CM

## 2022-10-28 LAB
ALBUMIN SERPL BCG-MCNC: 4.4 G/DL (ref 3.5–5.2)
ALP SERPL-CCNC: 101 U/L (ref 40–129)
ALT SERPL W P-5'-P-CCNC: 34 U/L (ref 10–50)
ANION GAP SERPL CALCULATED.3IONS-SCNC: 8 MMOL/L (ref 7–15)
AST SERPL W P-5'-P-CCNC: 30 U/L (ref 10–50)
BASOPHILS # BLD AUTO: 0.1 10E3/UL (ref 0–0.2)
BASOPHILS NFR BLD AUTO: 1 %
BILIRUB SERPL-MCNC: 0.5 MG/DL
BUN SERPL-MCNC: 15.3 MG/DL (ref 6–20)
CALCIUM SERPL-MCNC: 9.8 MG/DL (ref 8.6–10)
CHLORIDE SERPL-SCNC: 107 MMOL/L (ref 98–107)
CHOLEST SERPL-MCNC: 231 MG/DL
CREAT SERPL-MCNC: 1.06 MG/DL (ref 0.67–1.17)
DEPRECATED HCO3 PLAS-SCNC: 27 MMOL/L (ref 22–29)
EOSINOPHIL # BLD AUTO: 0.1 10E3/UL (ref 0–0.7)
EOSINOPHIL NFR BLD AUTO: 2 %
ERYTHROCYTE [DISTWIDTH] IN BLOOD BY AUTOMATED COUNT: 12.8 % (ref 10–15)
GFR SERPL CREATININE-BSD FRML MDRD: 83 ML/MIN/1.73M2
GLUCOSE SERPL-MCNC: 99 MG/DL (ref 70–99)
HCT VFR BLD AUTO: 43.9 % (ref 40–53)
HDLC SERPL-MCNC: 35 MG/DL
HGB BLD-MCNC: 15 G/DL (ref 13.3–17.7)
IMM GRANULOCYTES # BLD: 0 10E3/UL
IMM GRANULOCYTES NFR BLD: 0 %
LDLC SERPL CALC-MCNC: 159 MG/DL
LYMPHOCYTES # BLD AUTO: 2.3 10E3/UL (ref 0.8–5.3)
LYMPHOCYTES NFR BLD AUTO: 34 %
MCH RBC QN AUTO: 30.3 PG (ref 26.5–33)
MCHC RBC AUTO-ENTMCNC: 34.2 G/DL (ref 31.5–36.5)
MCV RBC AUTO: 89 FL (ref 78–100)
MONOCYTES # BLD AUTO: 0.7 10E3/UL (ref 0–1.3)
MONOCYTES NFR BLD AUTO: 11 %
NEUTROPHILS # BLD AUTO: 3.6 10E3/UL (ref 1.6–8.3)
NEUTROPHILS NFR BLD AUTO: 52 %
NONHDLC SERPL-MCNC: 196 MG/DL
NRBC # BLD AUTO: 0 10E3/UL
NRBC BLD AUTO-RTO: 0 /100
PLATELET # BLD AUTO: 262 10E3/UL (ref 150–450)
POTASSIUM SERPL-SCNC: 4.9 MMOL/L (ref 3.4–5.3)
PROT SERPL-MCNC: 7.2 G/DL (ref 6.4–8.3)
PSA SERPL-MCNC: 0.85 NG/ML (ref 0–3.5)
RBC # BLD AUTO: 4.95 10E6/UL (ref 4.4–5.9)
SODIUM SERPL-SCNC: 142 MMOL/L (ref 136–145)
T4 FREE SERPL-MCNC: 1.53 NG/DL (ref 0.9–1.7)
TRIGL SERPL-MCNC: 184 MG/DL
TSH SERPL DL<=0.005 MIU/L-ACNC: 0.15 UIU/ML (ref 0.3–4.2)
WBC # BLD AUTO: 6.8 10E3/UL (ref 4–11)

## 2022-10-28 PROCEDURE — 36415 COLL VENOUS BLD VENIPUNCTURE: CPT | Performed by: PATHOLOGY

## 2022-10-28 PROCEDURE — G0103 PSA SCREENING: HCPCS | Mod: 90 | Performed by: PATHOLOGY

## 2022-10-28 PROCEDURE — 84439 ASSAY OF FREE THYROXINE: CPT | Mod: 90 | Performed by: PATHOLOGY

## 2022-10-28 PROCEDURE — 80061 LIPID PANEL: CPT | Mod: 90 | Performed by: PATHOLOGY

## 2022-10-28 PROCEDURE — 80050 GENERAL HEALTH PANEL: CPT | Performed by: PATHOLOGY

## 2022-10-28 PROCEDURE — 99000 SPECIMEN HANDLING OFFICE-LAB: CPT | Performed by: PATHOLOGY

## 2022-10-28 NOTE — TELEPHONE ENCOUNTER
Screening Questions  BLUE  KIND OF PREP RED  LOCATION [review exclusion criteria] GREEN  SEDATION TYPE        Y Are you active on mychart?       Moreno Marion MD  Ordering/Referring Provider?        MEDICA What type of coverage do you have?      N Have you had a positive covid test in the last 90 days?     27.7 1. BMI  [BMI 40+ - review exclusion criteria]    Y  2. Are you able to give consent for your medical care? [IF NO,RN REVIEW]        N  3. Are you taking any prescription pain medications on a routine schedule?      NA 3a. EXTENDED PREP What kind of prescription?     N 4. Do you have any chemical dependencies such as alcohol, street drugs, or methadone?    N 5. Do you have any history of post-traumatic stress syndrome, severe anxiety or history of psychosis?      **If yes 3- 5 , please schedule with MAC sedation.**          IF YES TO ANY 6 - 10 - HOSPITAL SETTING ONLY.     N 6.   Do you need assistance transferring?     N 7.   Have you had a heart or lung transplant?    N 8.   Are you currently on dialysis?   N 9.   Do you use daily home oxygen?   N 10. Do you take nitroglycerin?   10a. NA If yes, how often?     11. [FEMALES]  NA Are you currently pregnant?    11a. NA If yes, how many weeks? [ Greater than 12 weeks, OR NEEDED]    N 12. Do you have Pulmonary Hypertension? *NEED PAC APPT AT [facility]*     N 13. [review exclusion criteria]  Do you have any implantable devices in your body (pacemaker, defib, LVAD)?    N 14. In the past 6 months, have you had any heart related issues including cardiomyopathy or heart attack?     14a. NA If yes, did it require cardiac stenting if so when?     N 15. Have you had a stroke or Transient ischemic attack (TIA - aka  mini stroke ) within 6 months?      N 16. Do you have mod to severe Obstructive Sleep Apnea?  [Hospital only - Ok at [place]]    N 17. Do you have SEVERE AND UNCONTROLLED asthma? *NEED PAC APPT AT [facility]*     N 18. Are you currently taking any blood  "thinners?     18a. If yes, inform patient to \"follow up w/ ordering provider for bridging instructions.\"    N 19. Do you take the medication Phentermine?    19a. If yes, \"Hold for 7 days before procedure.  Please consult your prescribing provider if you have questions about holding this medication.\"     N  20. Do you have chronic kidney disease?      N  21. Do you have a diagnosis of diabetes?     N  22. On a regular basis do you go 3-5 days between bowel movements?      23. Preferred LOCAL Pharmacy for Pre Prescription    [ LIST ONLY ONE PHARMACY]        [address] [phone]      - CLOSING REMINDERS -    Informed patient they will need an adult    Cannot take any type of public or medical transportation alone    Conscious Sedation- Needs  for 6 hours after the procedure       MAC/General-Needs  for 24 hours after procedure    Pre-Procedure Covid test to be completed [[facility] PCR Testing Required]    Confirmed Nurse will call to complete assessment       - SCHEDULING DETAILS -     KARL  Surgeon    1/13/2023  Date of Procedure  Lower Endoscopy [Colonoscopy]  Type of Procedure Scheduled   [facility] Location  [facility] PREP-If you answer yes to questions #8, #20, #21Which Colonoscopy Prep was Sent?     MODERATE Sedation Type     N PAC / Pre-op Required         Additional comments:                           "

## 2022-11-29 ENCOUNTER — ANCILLARY PROCEDURE (OUTPATIENT)
Dept: BONE DENSITY | Facility: CLINIC | Age: 57
End: 2022-11-29
Attending: FAMILY MEDICINE
Payer: COMMERCIAL

## 2022-11-29 DIAGNOSIS — Z79.52 ON PREDNISONE THERAPY: ICD-10-CM

## 2022-11-29 PROCEDURE — 77080 DXA BONE DENSITY AXIAL: CPT | Performed by: INTERNAL MEDICINE

## 2022-12-23 NOTE — PLAN OF CARE
"/66 (BP Location: Right arm)   Pulse 78   Temp 98.3  F (36.8  C) (Oral)   Resp 18   Ht 1.727 m (5' 8\")   Wt 82 kg (180 lb 12.4 oz)   SpO2 92%   BMI 27.49 kg/m      VSS, complained of incisional pain, took scheduled tylenol with some relief, incisions are dermabonded with no leaks, old chest tube site has a dressing that is c/d/I, up independently in room, IV is saline locked, voiding adequate amounts, room air during the night, may be able to discharge today, continue with plan of care  "
"/78 (BP Location: Right arm)   Pulse 81   Temp 98.3  F (36.8  C) (Oral)   Resp 18   Ht 1.727 m (5' 8\")   Wt 82 kg (180 lb 12.4 oz)   SpO2 90%   BMI 27.49 kg/m      Reason for admission: POD #1 s/p SUBXIPHOID BILATERAL THORACOSCOPIC THYMECTOMY  Neuro: A&Ox4, cooperative  Cardiac: WDL, denies chest pain  Respiratory: on RA, denies LEE or SOB  GI/: voiding adequately, no BM this shift  Skin: 4 lap sites dermabonded CDI, CT insertion site covered by bandage CDI  Pain: PRN oxy given  LDA: PIV SL  Activity: independent in room  Diet/Appetite: reg diet, fair appetite  Plan: continue POC    "
POD #1  Activity: stand by assist, patient up to chair this shift  Neuros:alert and oriented x 4  Cardiac:denies chest pain  Respiratory: room air, this am on 2L NC, encourage IC  GI/:LBM 3/22, voiding  Diet: regular diet, denies nausea  Skin: incision sites dermabond BO, wendy tube removed this am - dressing CDI  Lines/Drains: PIV      
Patient reports good pain relief with PCA Dilaudid.Initiated Is with good effort demo.Ambulated in room X 2,gait slow,steady.Voided.Tolerated clear liquid intake without nausea.Chest tube in place,drainage in tubing,unable to measure Continue to monitor.  
Patient verbalized understanding of After Visit Summary. Peripheral IV removed. Incision sites clean, dry, intact. Personal belongings packed and transport called.  
Vital signs:  Temp: 98.2  F (36.8  C) Temp src: Oral BP: 128/86 Pulse: 78   Resp: 16 SpO2: 95 % O2 Device: Nasal cannula Oxygen Delivery: 2 LPM     Time: 5587-0729  Status: POD #1 s/p SUBXIPHOID BILATERAL THORACOSCOPIC THYMECTOMY  Neuros: A&Ox4, calls appropriately.   Cardiac: WDL  Respiratory: Sats 95-97% on 2L NS. LS clear, denies SOB, on capnography monitoring. IS encouraged.   Pain: managed with dilaudid PCA, scheduled tylenol & Lidocaine patch with relief.   Nausea: Denies nausea.   Diet: Tolerating CLD, advanced to Regular diet this morning.   GI/: Voiding adequate amount. +BS, no BM.   Lines: L hand infusing tko with PCA dilaudid.   Drains: CT to water seal, no air leak, minimal output.   Labs: Reviewed.   Activity: Up ambulating to BR with SBA.   New Changes this Shift: No acute changes.   Plan: XR chest in am. Continue POC.     Admitted/transferred from: PACU on 3/22.   2 RN skin assessment completed by Ty Kingsley, RN and Alejandra Blackwell RN.  Skin assessment finding: Skin intact ex abdomen lap sites x4 dermabond, c/d/i, BO. CT to water seal.   Interventions/actions: Pt educated on pressure injury prevention and repositioning.   Will continue to monitor.    
show

## 2022-12-28 ENCOUNTER — TELEPHONE (OUTPATIENT)
Dept: GASTROENTEROLOGY | Facility: CLINIC | Age: 57
End: 2022-12-28

## 2022-12-28 DIAGNOSIS — Z12.11 SPECIAL SCREENING FOR MALIGNANT NEOPLASMS, COLON: Primary | ICD-10-CM

## 2022-12-28 RX ORDER — BISACODYL 5 MG
TABLET, DELAYED RELEASE (ENTERIC COATED) ORAL
Qty: 4 TABLET | Refills: 0 | Status: SHIPPED | OUTPATIENT
Start: 2022-12-28

## 2022-12-28 NOTE — TELEPHONE ENCOUNTER
Attempted to contact patient regarding upcoming colonoscopy  procedure on 1/13/23 for pre assessment questions. No answer.     Left message to return call to [phone] #4    Discuss Covid policy.     Pre op exam scheduled: N/A    Arrival time: 0830    [facility] [address] [place] [address], [address] Floor, [place] [address]    Sedation type: Conscious sedation     Anticoagulants: No    Electronic implanted devices? No    Diabetic? No    Indication for procedure: screening    Bowel prep recommendation: Standard Golytely     Prep instructions sent via [facility]. Bowel prep script sent to     [place], [place] - [address] [place] [address] [phone]      Denise George RN  Endoscopy Procedure Pre Assessment RN

## 2023-01-04 NOTE — TELEPHONE ENCOUNTER
Second attempt for pre-assessment prior to upcoming colonoscopy     No answer.  Left message to return call [phone] #4    The Gifts Project[employer] message sent.     Denise Gamez RN  Endoscopy Procedure Pre Assessment RN

## 2023-01-05 NOTE — TELEPHONE ENCOUNTER
Patient returned call.     Pre assessment questions completed for upcoming colonoscopy  procedure scheduled on 1/13/2023    COVID policy reviewed.     Reviewed procedural arrival time 0830 and facility location [facility]; [address] [place] [address], [address], [place] [address]    Designated  policy reviewed. Instructed to have someone stay 6 hours post procedure.     Reviewed procedure prep instructions.     Patient verbalized understanding and had no questions or concerns at this time.    Denise Hall RN  Endoscopy Procedure Pre Assessment RN

## 2023-01-06 NOTE — TELEPHONE ENCOUNTER
Patient calling to confirm arrival time as it is not listed in mychart.     Arrival time 0830    Denise Hall RN

## 2023-01-12 ENCOUNTER — ANESTHESIA EVENT (OUTPATIENT)
Dept: SURGERY | Facility: AMBULATORY SURGERY CENTER | Age: 58
End: 2023-01-12
Payer: COMMERCIAL

## 2023-01-13 ENCOUNTER — HOSPITAL ENCOUNTER (OUTPATIENT)
Facility: AMBULATORY SURGERY CENTER | Age: 58
Discharge: HOME | End: 2023-01-13
Attending: INTERNAL MEDICINE
Payer: COMMERCIAL

## 2023-01-13 ENCOUNTER — ANESTHESIA (OUTPATIENT)
Dept: SURGERY | Facility: AMBULATORY SURGERY CENTER | Age: 58
End: 2023-01-13
Payer: COMMERCIAL

## 2023-01-13 VITALS
DIASTOLIC BLOOD PRESSURE: 79 MMHG | TEMPERATURE: 97.8 F | OXYGEN SATURATION: 97 % | WEIGHT: 177 LBS | HEIGHT: 68 IN | HEART RATE: 85 BPM | BODY MASS INDEX: 26.83 KG/M2 | SYSTOLIC BLOOD PRESSURE: 115 MMHG | RESPIRATION RATE: 14 BRPM

## 2023-01-13 VITALS — HEART RATE: 77 BPM

## 2023-01-13 LAB — COLONOSCOPY: NORMAL

## 2023-01-13 PROCEDURE — 45378 DIAGNOSTIC COLONOSCOPY: CPT | Mod: 33

## 2023-01-13 RX ORDER — NALOXONE HYDROCHLORIDE 0.4 MG/ML
0.2 INJECTION, SOLUTION INTRAMUSCULAR; INTRAVENOUS; SUBCUTANEOUS
Status: DISCONTINUED | OUTPATIENT
Start: 2023-01-13 | End: 2023-01-14 | Stop reason: HOSPADM

## 2023-01-13 RX ORDER — ONDANSETRON 2 MG/ML
4 INJECTION INTRAMUSCULAR; INTRAVENOUS EVERY 6 HOURS PRN
Status: DISCONTINUED | OUTPATIENT
Start: 2023-01-13 | End: 2023-01-14 | Stop reason: HOSPADM

## 2023-01-13 RX ORDER — NALOXONE HYDROCHLORIDE 0.4 MG/ML
0.4 INJECTION, SOLUTION INTRAMUSCULAR; INTRAVENOUS; SUBCUTANEOUS
Status: DISCONTINUED | OUTPATIENT
Start: 2023-01-13 | End: 2023-01-14 | Stop reason: HOSPADM

## 2023-01-13 RX ORDER — ONDANSETRON 2 MG/ML
4 INJECTION INTRAMUSCULAR; INTRAVENOUS
Status: DISCONTINUED | OUTPATIENT
Start: 2023-01-13 | End: 2023-01-14 | Stop reason: HOSPADM

## 2023-01-13 RX ORDER — PROPOFOL 10 MG/ML
INJECTION, EMULSION INTRAVENOUS PRN
Status: DISCONTINUED | OUTPATIENT
Start: 2023-01-13 | End: 2023-01-13

## 2023-01-13 RX ORDER — SODIUM CHLORIDE, SODIUM LACTATE, POTASSIUM CHLORIDE, CALCIUM CHLORIDE 600; 310; 30; 20 MG/100ML; MG/100ML; MG/100ML; MG/100ML
INJECTION, SOLUTION INTRAVENOUS CONTINUOUS PRN
Status: DISCONTINUED | OUTPATIENT
Start: 2023-01-13 | End: 2023-01-13

## 2023-01-13 RX ORDER — LIDOCAINE HYDROCHLORIDE 20 MG/ML
INJECTION, SOLUTION INFILTRATION; PERINEURAL PRN
Status: DISCONTINUED | OUTPATIENT
Start: 2023-01-13 | End: 2023-01-13

## 2023-01-13 RX ORDER — PROPOFOL 10 MG/ML
INJECTION, EMULSION INTRAVENOUS CONTINUOUS PRN
Status: DISCONTINUED | OUTPATIENT
Start: 2023-01-13 | End: 2023-01-13

## 2023-01-13 RX ORDER — ONDANSETRON 4 MG/1
4 TABLET, ORALLY DISINTEGRATING ORAL EVERY 6 HOURS PRN
Status: DISCONTINUED | OUTPATIENT
Start: 2023-01-13 | End: 2023-01-14 | Stop reason: HOSPADM

## 2023-01-13 RX ORDER — LIDOCAINE 40 MG/G
CREAM TOPICAL
Status: DISCONTINUED | OUTPATIENT
Start: 2023-01-13 | End: 2023-01-14 | Stop reason: HOSPADM

## 2023-01-13 RX ORDER — PROCHLORPERAZINE MALEATE 10 MG
10 TABLET ORAL EVERY 6 HOURS PRN
Status: DISCONTINUED | OUTPATIENT
Start: 2023-01-13 | End: 2023-01-14 | Stop reason: HOSPADM

## 2023-01-13 RX ORDER — FLUMAZENIL 0.1 MG/ML
0.2 INJECTION, SOLUTION INTRAVENOUS
Status: ACTIVE | OUTPATIENT
Start: 2023-01-13 | End: 2023-01-13

## 2023-01-13 RX ADMIN — LIDOCAINE HYDROCHLORIDE 40 MG: 20 INJECTION, SOLUTION INFILTRATION; PERINEURAL at 09:50

## 2023-01-13 RX ADMIN — SODIUM CHLORIDE, SODIUM LACTATE, POTASSIUM CHLORIDE, CALCIUM CHLORIDE: 600; 310; 30; 20 INJECTION, SOLUTION INTRAVENOUS at 09:47

## 2023-01-13 RX ADMIN — PROPOFOL 150 MCG/KG/MIN: 10 INJECTION, EMULSION INTRAVENOUS at 09:50

## 2023-01-13 RX ADMIN — PROPOFOL 100 MG: 10 INJECTION, EMULSION INTRAVENOUS at 09:50

## 2023-01-13 NOTE — ANESTHESIA CARE TRANSFER NOTE
Patient: Leonidas Pratt    Procedure: Procedure(s):  COLONOSCOPY       Diagnosis: Colon cancer screening [Z12.11]  Diagnosis Additional Information: No value filed.    Anesthesia Type:   No value filed.     Note:    Oropharynx: oropharynx clear of all foreign objects  Level of Consciousness: awake  Oxygen Supplementation: room air    Independent Airway: airway patency satisfactory and stable  Dentition: dentition unchanged  Vital Signs Stable: post-procedure vital signs reviewed and stable  Report to RN Given: handoff report given  Patient transferred to: Phase II    Handoff Report: Identifed the Patient, Identified the Reponsible Provider, Reviewed the pertinent medical history, Discussed the surgical course, Reviewed Intra-OP anesthesia mangement and issues during anesthesia, Set expectations for post-procedure period and Allowed opportunity for questions and acknowledgement of understanding      Vitals:  Vitals Value Taken Time   BP     Temp     Pulse     Resp     SpO2         Electronically Signed By: MARY Mas CRNA  January 13, 2023  10:09 AM

## 2023-01-13 NOTE — H&P
Leonidas Pratt  [id number]  male  57 year old      Reason for procedure/surgery: Screening colonosopy    Patient Active Problem List   Diagnosis     Hypothyroidism     Pre-operative cardiovascular examination     Routine general medical examination at a health care facility     Calculus of ureter (URETERAL)     Exposure to strep throat     Myasthenia gravis with exacerbation (H)       Past Surgical History:    Past Surgical History:   Procedure Laterality Date     HERNIA REPAIR       LASER HOLMIUM LITHOTRIPSY URETER(S), INSERT STENT, COMBINED Right 10/15/2014    Procedure: COMBINED CYSTOSCOPY, URETEROSCOPY, LASER HOLMIUM LITHOTRIPSY URETER(S), INSERT STENT;  Surgeon: Thong Bates MD;  Location: [facility] OR     RECESSION RESECTION WITH ADJUSTABLE SUTURE  9/4/2012    LLRc 5.0mm on adj. LIRc 4.5mm on adj.     RECESSION RESECTION WITH ADJUSTABLE SUTURE BILATERAL  12/18/2012    RSRc 3.0mm; adj. suture     STRABISMUS SURGERY       THORACOSCOPIC THYMECTOMY N/A 3/22/2021    Procedure: SUBXIPHOID BILATERAL THORACOSCOPIC THYMECTOMY;  Surgeon: Benjy Hull MD;  Location: [facility] OR       Past Medical History:   Past Medical History:   Diagnosis Date     Asthma      Kidney stone      Myasthenia gravis (H)      Strabismus      Thyroid disease        Social History:   Social History     Tobacco Use     Smoking status: Never     Smokeless tobacco: Never   Substance Use Topics     Alcohol use: No       Family History:   Family History   Problem Relation Age of Onset     Coronary Artery Disease Mother 59     Valvular heart disease Father      Heart Failure Father      Heart Disease Maternal Grandmother      Heart Disease Maternal Grandfather      Thyroid Disease Sister        Allergies:   Allergies   Allergen Reactions     Nkda [No Known Drug Allergies]        Active Medications:   Current Outpatient Medications   Medication Sig Dispense Refill     albuterol (PROAIR HFA/PROVENTIL HFA/VENTOLIN HFA) 108 (90 BASE) MCG/ACT  Inhaler Inhale 2 puffs into the lungs every 6 hours as needed for shortness of breath / dyspnea or wheezing 1 Inhaler 0     apraclonidine (IOPIDINE) 0.5 % ophthalmic solution Place 1 drop into both eyes 3 times daily as needed (ptosis) For additional refills, please schedule a follow-up appointment at [phone]. 5 mL 0     bisacodyl (DULCOLAX) 5 MG EC tablet Take 2 tablets at 3 pm the day before your procedure. If your procedure is before 11 am, take 2 additional tablets at 11 pm. If your procedure is after 11 am, take 2 additional tablets at 6 am. For additional instructions refer to your colonoscopy prep instructions. 4 tablet 0     levothyroxine (SYNTHROID/LEVOTHROID) 150 MCG tablet Take 1 tablet (150 mcg) by mouth daily 90 tablet 3     polyethylene glycol (GOLYTELY) 236 g suspension The night before the exam at 6 pm drink an 8-ounce glass every 15 minutes until the jug is half empty. If you arrive before 11 AM: Drink the other half of the Golytely jug at 11 PM night before procedure. If you arrive after 11 AM: Drink the other half of the Golytely jug at 6 AM day of procedure. For additional instructions refer to your colonoscopy prep instructions. 4000 mL 0     predniSONE (DELTASONE) 1 MG tablet Take 1 tablet (1 mg) by mouth See Admin Instructions Take 1 to 4 tablets by mouth daily as directed. 120 tablet 3     predniSONE (DELTASONE) 5 MG tablet Take 1 tablet (5 mg) by mouth daily 30 tablet 5     predniSONE (DELTASONE) 5 MG tablet Take one tablet daily 30 tablet 2     pyridostigmine (MESTINON) 60 MG tablet Take 1 tablet (60 mg) by mouth 2 times daily 60 tablet 5     pyridostigmine (MESTINON) 60 MG tablet TAKE ONE TABLET BY MOUTH THREE TIMES A DAY 90 tablet 3     Vitamin D, Cholecalciferol, 25 MCG (1000 UT) TABS Take by mouth every morning         Systemic Review:   ENT/MOUTH: NEGATIVE for ear, mouth and throat problems  RESP: NEGATIVE for significant cough or SOB  CV: NEGATIVE for chest pain, palpitations or  "peripheral edema    Physical Examination:   Vital Signs: BP (!) 128/106 (BP Location: Right arm)   Pulse 76   Temp 96.8  F (36  C) (Temporal)   Resp 18   Ht 1.715 m (5' 7.5\")   Wt 80.3 kg (177 lb)   SpO2 96%   BMI 27.31 kg/m    NECK: no adenopathy, no asymmetry, masses, or scars  RESP: lungs clear to auscultation - no rales, rhonchi or wheezes  CV: regular rate and rhythm, normal S1 S2, no S3 or S4, no murmur, click or rub, no peripheral edema and peripheral pulses strong  ABDOMEN: soft, nontender, no hepatosplenomegaly, no masses and bowel sounds normal  MS: no gross musculoskeletal defects noted, no edema    Plan: Appropriate to proceed as scheduled.      Trevor Perez MD  1/13/2023    PCP:  Moreno Marion    "

## 2023-01-15 NOTE — ANESTHESIA POSTPROCEDURE EVALUATION
Patient: Leonidas Pratt    Procedure: Procedure(s):  COLONOSCOPY       Anesthesia Type:  MAC    Note:  Disposition: Outpatient   Postop Pain Control: Uneventful            Sign Out: Well controlled pain   PONV: No   Neuro/Psych: Uneventful            Sign Out: Acceptable/Baseline neuro status   Airway/Respiratory: Uneventful            Sign Out: Acceptable/Baseline resp. status   CV/Hemodynamics: Uneventful            Sign Out: Acceptable CV status; No obvious hypovolemia; No obvious fluid overload   Other NRE: NONE   DID A NON-ROUTINE EVENT OCCUR? No           Last vitals:  Vitals Value Taken Time   /79 01/13/23 1018   Temp 36.6  C (97.8  F) 01/13/23 1018   Pulse 85 01/13/23 1018   Resp 14 01/13/23 1018   SpO2 97 % 01/13/23 1018       Electronically Signed By: Dionisio Aguilar MD  January 15, 2023  8:01 AM

## 2023-01-21 DIAGNOSIS — E03.9 HYPOTHYROIDISM, UNSPECIFIED TYPE: Primary | ICD-10-CM

## 2023-01-21 RX ORDER — LEVOTHYROXINE SODIUM 137 UG/1
137 TABLET ORAL DAILY
Qty: 90 TABLET | Refills: 3 | Status: SHIPPED | OUTPATIENT
Start: 2023-01-21

## 2023-02-06 RX ORDER — PREDNISONE 10 MG/1
20 TABLET ORAL DAILY
Qty: 60 TABLET | Refills: 1 | Status: SHIPPED | OUTPATIENT
Start: 2023-02-06

## 2023-02-08 ENCOUNTER — TELEPHONE (OUTPATIENT)
Dept: NEUROLOGY | Facility: CLINIC | Age: 58
End: 2023-02-08

## 2023-02-08 ENCOUNTER — OFFICE VISIT (OUTPATIENT)
Dept: NEUROLOGY | Facility: CLINIC | Age: 58
End: 2023-02-08
Payer: COMMERCIAL

## 2023-02-08 VITALS
RESPIRATION RATE: 16 BRPM | HEART RATE: 66 BPM | SYSTOLIC BLOOD PRESSURE: 130 MMHG | OXYGEN SATURATION: 99 % | DIASTOLIC BLOOD PRESSURE: 89 MMHG

## 2023-02-08 DIAGNOSIS — G70.00 MYASTHENIA GRAVIS WITHOUT EXACERBATION (H): Primary | ICD-10-CM

## 2023-02-08 DIAGNOSIS — G70.01 MYASTHENIA GRAVIS WITH EXACERBATION (H): ICD-10-CM

## 2023-02-08 PROCEDURE — 99215 OFFICE O/P EST HI 40 MIN: CPT | Performed by: PSYCHIATRY & NEUROLOGY

## 2023-02-08 RX ORDER — DIPHENHYDRAMINE HYDROCHLORIDE 50 MG/ML
50 INJECTION INTRAMUSCULAR; INTRAVENOUS
Status: CANCELLED
Start: 2023-02-08

## 2023-02-08 RX ORDER — MEPERIDINE HYDROCHLORIDE 25 MG/ML
25 INJECTION INTRAMUSCULAR; INTRAVENOUS; SUBCUTANEOUS EVERY 30 MIN PRN
Status: CANCELLED | OUTPATIENT
Start: 2023-02-08

## 2023-02-08 RX ORDER — METHYLPREDNISOLONE SODIUM SUCCINATE 125 MG/2ML
125 INJECTION, POWDER, LYOPHILIZED, FOR SOLUTION INTRAMUSCULAR; INTRAVENOUS
Status: CANCELLED
Start: 2023-02-08

## 2023-02-08 RX ORDER — HEPARIN SODIUM,PORCINE 10 UNIT/ML
5 VIAL (ML) INTRAVENOUS
Status: CANCELLED | OUTPATIENT
Start: 2023-02-08

## 2023-02-08 RX ORDER — EPINEPHRINE 1 MG/ML
0.3 INJECTION, SOLUTION, CONCENTRATE INTRAVENOUS EVERY 5 MIN PRN
Status: CANCELLED | OUTPATIENT
Start: 2023-02-08

## 2023-02-08 RX ORDER — HEPARIN SODIUM (PORCINE) LOCK FLUSH IV SOLN 100 UNIT/ML 100 UNIT/ML
5 SOLUTION INTRAVENOUS
Status: CANCELLED | OUTPATIENT
Start: 2023-02-08

## 2023-02-08 RX ORDER — ALBUTEROL SULFATE 90 UG/1
1-2 AEROSOL, METERED RESPIRATORY (INHALATION)
Status: CANCELLED
Start: 2023-02-08

## 2023-02-08 RX ORDER — ALBUTEROL SULFATE 0.83 MG/ML
2.5 SOLUTION RESPIRATORY (INHALATION)
Status: CANCELLED | OUTPATIENT
Start: 2023-02-08

## 2023-02-08 ASSESSMENT — PAIN SCALES - GENERAL: PAINLEVEL: NO PAIN (0)

## 2023-02-08 NOTE — PROGRESS NOTES
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 Onset was around 2010. First symptom was diplopia. About 3 years later ptosis started. No dysarthria, dysphagia, SOB, or weakness. In 2015 he was found to have ACHR ab. In 2015 he was diagnosed with MG and started on prednisone. He started prednisone 60 mg and tapered off over about 3 months. He was off immunotherapy in 2016, and remained off until 12/2018. At that end of 2018 he developed diplopia and restarted prednisone 60 mg daily. He tapered over a few months. By 2/20 he was taking prednisone 5 mg daily. This time prednisone was helpful to alleviate ptosis and diplopia. He remained on low dose prednisone (around 5 mg) through most of 2019. He was stable during that time. In April 2020 he then developed worsening ptosis and diplopia of both eyes. In 4/20 he increased prednisone to 40 mg daily. By June 2020 he was back down to 5 mg. In 9/20 he then experienced new weakness in his left hand. Typing was difficult. He also felt that his legs were weaker. There was a clear drop off in his typical level of activity and exercise. No dysarthria or dysphagia. In 10/20 prednisone increased to 40 mg daily. Prednisone tapered to 10mg daily in 01/21, and 5 mg by 3/21. He underwent thymectomy on 3/22/21. Leading into thymectomy he received IVIG (2g/kg over 3 days from 3/15-17/2021). It was helpful but poorly tolerated (headache, nausea, malaise). In late 2021 and early 2022 he made several attempts to reduce prednisone below 5. By June 2022 he was able to wean off prednisone but in 1/23 he relapsed.     Interval history:   I last saw him in clinic on 3/14/22. At that point he was tapering off prednisone. In summer 2022 he was able to taper off and had only minor ocular symptoms. In January 2023 he worsened. Diplopia and ptosis worsened. He developed chewing difficulty. No  dysarthria or dysphagia. He developed weakness, especially in his upper limbs. No shortness of breath, There were no triggers for worsening. He continued mestinon 60 mg BID, which was not helpful. Three days ago he resumed prednisone 20 mg daily.     Prior pertinent laboratory work-up:  : Normal/negative Hba1c, TSH  4/15: ACHR ab binding 3.1 (N<0.4)    Prior electrophysiologic work-up:  12/10: RNS of the right facial nerves showed no abnormal decreament or increment.Single fiber EMG of the right frontalis muscle was subjectively well within the normal range (normal <1.69).  All individual pairs demonstrated normal jitter ranging from 13 ms to 47.2 ms with normal <53.5 ms in the frontalis.  The mean jitter of the 20 pairs in the right frontalis muscle was 22.9 ms (nl< 35.5ms). There was no transmission blocking. There is no electrophysiological evidence for neuromuscular junction disorder.  10/14/20: NCS/RNS showed unequivocal decrement in facial-nasalis and ulnar-ADM.     Prior imagin/9/17 CT C/A/P showed no mediastinal mass  10/29/20: CT chest showed unchanged tiny soft tissue density in the anterior superior mediastinum, presumably residual thymus. No enlargement to suggest Thymoma.    Past Medical History:   Past Medical History:   Diagnosis Date     Asthma      Kidney stone      Myasthenia gravis (H)      Strabismus      Thyroid disease      Past Surgical History:  Past Surgical History:   Procedure Laterality Date     COLONOSCOPY N/A 2023    Procedure: COLONOSCOPY;  Surgeon: Trevor Perez MD;  Location: [facility] OR     HERNIA REPAIR       LASER HOLMIUM LITHOTRIPSY URETER(S), INSERT STENT, COMBINED Right 10/15/2014    Procedure: COMBINED CYSTOSCOPY, URETEROSCOPY, LASER HOLMIUM LITHOTRIPSY URETER(S), INSERT STENT;  Surgeon: Thong Bates MD;  Location: [facility] OR     RECESSION RESECTION WITH ADJUSTABLE SUTURE  2012    LLRc 5.0mm on adj. LIRc 4.5mm on adj.     RECESSION RESECTION WITH  ADJUSTABLE SUTURE BILATERAL  12/18/2012    RSRc 3.0mm; adj. suture     STRABISMUS SURGERY       THORACOSCOPIC THYMECTOMY N/A 3/22/2021    Procedure: SUBXIPHOID BILATERAL THORACOSCOPIC THYMECTOMY;  Surgeon: Benjy Hull MD;  Location:  OR     Family history:    There is no known family history of myopathy or other neuromuscular disorders. He does have a sister with spasmodic dysphonia.     Social History:    He denies tobacco, alcohol, or illicit drug use.     Medical Allergies:    Allergies   Allergen Reactions     Nkda [No Known Drug Allergies]      Current Medications:    Current Outpatient Medications   Medication     albuterol (PROAIR HFA/PROVENTIL HFA/VENTOLIN HFA) 108 (90 BASE) MCG/ACT Inhaler     apraclonidine (IOPIDINE) 0.5 % ophthalmic solution     bisacodyl (DULCOLAX) 5 MG EC tablet     levothyroxine (SYNTHROID/LEVOTHROID) 137 MCG tablet     levothyroxine (SYNTHROID/LEVOTHROID) 150 MCG tablet     polyethylene glycol (GOLYTELY) 236 g suspension     predniSONE (DELTASONE) 1 MG tablet     predniSONE (DELTASONE) 10 MG tablet     predniSONE (DELTASONE) 5 MG tablet     predniSONE (DELTASONE) 5 MG tablet     pyridostigmine (MESTINON) 60 MG tablet     pyridostigmine (MESTINON) 60 MG tablet     Vitamin D, Cholecalciferol, 25 MCG (1000 UT) TABS     No current facility-administered medications for this visit.     Review of Systems: A review of systems was obtained and was negative except for what was noted above.    Physical examination:    /89   Pulse 66   Resp 16   SpO2 99%     General Appearance: NAD     Skin: There are no rashes or other skin lesions.     Neurologic examination:     Mental status:  Patient is alert, attentive, and oriented x 3.  Language is coherent and fluent without aphasia.  Memory, comprehension and ability to follow commands were intact.        Cranial nerves:  Left ptosis at baseline. Eye closure weak.  Ptosis worsens with 10 seconds upgaze, eyelid drops to below  pupil on left. Diplopia at mid position and in all gaze directions. Symmetric smile but flat. Eye and lip closure weak. No dysarthria.      Motor exam: No atrophy or fasciculations. Manual muscle testing revealed the following MRC grade muscle power:    Right Left   Neck flexion 5 5   Neck extension: 5 5   Shoulder ext rotation 5 5   Shoulder abduction:        5 5   Elbow extension: 5 5   Elbow flexion:            5 5   Wrist flexion:            5 5   Wrist extension:        4 5   Finger flexion 5 5   Finger extension 5 5   FDI 5 5   Hip flexion 5 5   Knee flexion 5 5   Knee extension 5 5   Dorsiflexion 5 5   Plantar flexion 5 5   Red indicates worse when compared to last examination  Green indicates improved when compared to last examination     MG Outcomes MG-ADL MG-Composite  strength (kg) MG medications   10/12/20 7 9 Right 35, Left 31 Pred 5 mg daily   11/18/20 5 6 Right 40, Left 39 Pred 25 mg daily   1/20/21 4 2 Right 41, Left 41 Prednisone 10mg daily   3/22/21 xxx xxx xxx Thymectomy   3/31/21   5 9 Right 44, Left 46 IVIG prior to thymectomy 2g/kg over 3 days (03/15-17/2021),  Prednisone 5mg daily   7/14/21 3 4 Right 43, Left 44 Pred 5 mg   3/14/22 3 Not tested (video) Not tested (video) Pred 5 mg   6/22 xxx xxx xxx Weaned off prednisone   2/8/23 7 8 Right 32 kg, left 36 Kg None (pred 20 mg started just 2 days ago)     Assessment:    Leonidas Pratt is a 58 year old man with ACHR ab positive generalized non-thymomatous myasthenia gravis now 2 years out from thymectomy (3/22/21). Unfortunately he has recently relapsed. Deterioration is well documented on worsening MRC,  strength, MG-ADL and MG composite scores. Although resumption of prednisone has been started, this is not a good long term solution.  He has a recent -2.4 on DEXA. We discussed pros and cons of vyvgart vs IVIG. Considering strength of the recent randomized placebo controlled trial will proceed with vyvgart weekly x 4, with repeated  "infusions cycles depending on durability of response. I remain optimistic that we can eventually reduce prednisone back to his previous dose, but at this time a more aggressive immuotherapy approach is clearly needed.       Plan:      1. Immunotherapy:   - Prednisone: Continue 20 mg daily for now. If symptoms worsen before starting vyvgart then may need to increase. I anticipate a fairly rapid taper can be achieved once vyvgart is started.   - Start vyvgart 10 mg/kg weekly x 4. Repeat cycle anticipated in 8-9 weeks, but will move sooner or later depending on clinical response  2. Thymectomy: Completed 3/22/21. Benefits from thymectomy may take 1-3 years to be fully appreciated. If disease continues to worsen or is not controlled with #1 will repeat CT chest to look for residual thymus tissue  3. Supportive care:  - Mestinon: Continue 60 mg BID to TID  4. Potential MG triggers including heat, surgery, and illness. Certain medications and over the counter preparations may also cause worsening of MG symptoms. A list of cautionary medications can be found at: [email or web address]-MG/[email or web address]-Drugs  5. Follow up 2-3 months. Sooner if needed.    -----  ADDENDUM: MGFA classification IIIb    ADDENDUM: Per communication with his insurance they are requiring \"inadequate response after a minimum of 1 year trial with two or more immunosuppressive therapies OR patient required chronic treatment with plasmapheresis or plasma exchange or IVIG in addition to immunosuppressant therapy\". So they are asking us to use off label treatments that have no evidence for use instead of proven effective therapies with an FDA indication. Note that he has received IVIG in the past and it was not well tolerated. Also note that he has used prednisone for over a year and he is also 1 year out from another proven effective therapy (thymectomy). I will ask to speak with his insurance to see if we can clear this up. Starting " an IST at this point is NOT the best plan as they may take many months to work (if at all). We can not keep him at high dose prednisone while waiting for that.     4/25/23: Note from Leonidas. Vyvgart series completed March 28. He feels that it helped about 20%. Also reports that over the last week he has been worsening again. He now has a hard turning an ignition key. It's a challenge to walk an incline. Facial weakness present. Back to the eye patch.  We discussed options. Most that respond to vyvgart do so within a couple weeks, but there may be some late responders. I am going to repeat the vyvgart cycle ASAP to see if there is any more benefit to be had. If not or if he conitnues to decline then will change course. Complement inhibition with ultomiris makes the most sense next.

## 2023-02-08 NOTE — TELEPHONE ENCOUNTER
Nieves to begin Vyvgart infusions. Therapy plan routed to Dr. Jackson for signature.  Once signed, nieves will be directed to schedule infusions at [facility].    Lynda Smith RN

## 2023-02-08 NOTE — LETTER
2/8/2023       RE: Leonidas Pratt  [address]  [address]  [place] [address]     Dear Colleague,    Thank you for referring your patient, Leonidas Pratt, to the [facility] [place] at . Please see a copy of my visit note below.    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 Onset was around 2010. First symptom was diplopia. About 3 years later ptosis started. No dysarthria, dysphagia, SOB, or weakness. In 2015 he was found to have ACHR ab. In 2015 he was diagnosed with MG and started on prednisone. He started prednisone 60 mg and tapered off over about 3 months. He was off immunotherapy in 2016, and remained off until 12/2018. At that end of 2018 he developed diplopia and restarted prednisone 60 mg daily. He tapered over a few months. By 2/20 he was taking prednisone 5 mg daily. This time prednisone was helpful to alleviate ptosis and diplopia. He remained on low dose prednisone (around 5 mg) through most of 2019. He was stable during that time. In April 2020 he then developed worsening ptosis and diplopia of both eyes. In 4/20 he increased prednisone to 40 mg daily. By June 2020 he was back down to 5 mg. In 9/20 he then experienced new weakness in his left hand. Typing was difficult. He also felt that his legs were weaker. There was a clear drop off in his typical level of activity and exercise. No dysarthria or dysphagia. In 10/20 prednisone increased to 40 mg daily. Prednisone tapered to 10mg daily in 01/21, and 5 mg by 3/21. He underwent thymectomy on 3/22/21. In late 2021 and early 2022 he made several attempts to reduce prednisone below 5. By June 2022 he was able to wean off prednisone but in 1/23 he relapsed.     Interval history:   I last saw him in clinic on 3/14/22. At that point he was tapering off  prednisone. In summer 2022 he was able to taper off and had only minor ocular symptoms. In 2023 he worsened. Diplopia and ptosis worsened. He developed chewing difficulty. No dysarthria or dysphagia. He developed weakness, especially in his upper limbs. No shortness of breath, There were no triggers for worsening. He continued mestinon 60 mg BID, which was not helpful. Three days ago he resumed prednisone 20 mg daily.     Prior pertinent laboratory work-up:  : Normal/negative Hba1c, TSH  4/15: ACHR ab binding 3.1 (N<0.4)    Prior electrophysiologic work-up:  12/10: RNS of the right facial nerves showed no abnormal decreament or increment.Single fiber EMG of the right frontalis muscle was subjectively well within the normal range (normal <1.69).  All individual pairs demonstrated normal jitter ranging from 13 ms to 47.2 ms with normal <53.5 ms in the frontalis.  The mean jitter of the 20 pairs in the right frontalis muscle was 22.9 ms (nl< 35.5ms). There was no transmission blocking. There is no electrophysiological evidence for neuromuscular junction disorder.  10/14/20: NCS/RNS showed unequivocal decrement in facial-nasalis and ulnar-ADM.     Prior imagin/9/17 CT C/A/P showed no mediastinal mass  10/29/20: CT chest showed unchanged tiny soft tissue density in the anterior superior mediastinum, presumably residual thymus. No enlargement to suggest Thymoma.    Past Medical History:   Past Medical History:   Diagnosis Date     Asthma      Kidney stone      Myasthenia gravis (H)      Strabismus      Thyroid disease      Past Surgical History:  Past Surgical History:   Procedure Laterality Date     COLONOSCOPY N/A 2023    Procedure: COLONOSCOPY;  Surgeon: Trevor Perez MD;  Location: [facility] OR     HERNIA REPAIR       LASER HOLMIUM LITHOTRIPSY URETER(S), INSERT STENT, COMBINED Right 10/15/2014    Procedure: COMBINED CYSTOSCOPY, URETEROSCOPY, LASER HOLMIUM LITHOTRIPSY URETER(S), INSERT STENT;   Surgeon: Thong Bates MD;  Location: [facility] OR     RECESSION RESECTION WITH ADJUSTABLE SUTURE  9/4/2012    LLRc 5.0mm on adj. LIRc 4.5mm on adj.     RECESSION RESECTION WITH ADJUSTABLE SUTURE BILATERAL  12/18/2012    RSRc 3.0mm; adj. suture     STRABISMUS SURGERY       THORACOSCOPIC THYMECTOMY N/A 3/22/2021    Procedure: SUBXIPHOID BILATERAL THORACOSCOPIC THYMECTOMY;  Surgeon: Benjy Hull MD;  Location: [facility] OR     Family history:    There is no known family history of myopathy or other neuromuscular disorders. He does have a sister with spasmodic dysphonia.     Social History:    He denies tobacco, alcohol, or illicit drug use.     Medical Allergies:    Allergies   Allergen Reactions     Nkda [No Known Drug Allergies]      Current Medications:    Current Outpatient Medications   Medication     albuterol (PROAIR HFA/PROVENTIL HFA/VENTOLIN HFA) 108 (90 BASE) MCG/ACT Inhaler     apraclonidine (IOPIDINE) 0.5 % ophthalmic solution     bisacodyl (DULCOLAX) 5 MG EC tablet     levothyroxine (SYNTHROID/LEVOTHROID) 137 MCG tablet     levothyroxine (SYNTHROID/LEVOTHROID) 150 MCG tablet     polyethylene glycol (GOLYTELY) 236 g suspension     predniSONE (DELTASONE) 1 MG tablet     predniSONE (DELTASONE) 10 MG tablet     predniSONE (DELTASONE) 5 MG tablet     predniSONE (DELTASONE) 5 MG tablet     pyridostigmine (MESTINON) 60 MG tablet     pyridostigmine (MESTINON) 60 MG tablet     Vitamin D, Cholecalciferol, 25 MCG (1000 UT) TABS     No current facility-administered medications for this visit.     Review of Systems: A review of systems was obtained and was negative except for what was noted above.    Physical examination:    /89   Pulse 66   Resp 16   SpO2 99%     General Appearance: NAD     Skin: There are no rashes or other skin lesions.     Neurologic examination:     Mental status:  Patient is alert, attentive, and oriented x 3.  Language is coherent and fluent without aphasia.  Memory,  comprehension and ability to follow commands were intact.        Cranial nerves:  Left ptosis at baseline. Eye closure weak.  Ptosis worsens with 10 seconds upgaze, eyelid drops to below pupil on left. Diplopia at mid position and in all gaze directions. Symmetric full smile. Eye and lip closure strong. No dysarthria.      Motor exam: No atrophy or fasciculations. Manual muscle testing revealed the following MRC grade muscle power:    Right Left   Neck flexion 5 5   Neck extension: 5 5   Shoulder ext rotation 5 5   Shoulder abduction:        5 5   Elbow extension: 5 5   Elbow flexion:            5 5   Wrist flexion:            5 5   Wrist extension:        4 5   Finger flexion 5 5   Finger extension 5 5   FDI 5 5   Hip flexion 5 5   Knee flexion 5 5   Knee extension 5 5   Dorsiflexion 5 5   Plantar flexion 5 5   Red indicates worse when compared to last examination  Green indicates improved when compared to last examination     MG Outcomes MG-ADL MG-Composite  strength (kg) MG medications   10/12/20 7 9 Right 35, Left 31 Pred 5 mg daily   11/18/20 5 6 Right 40, Left 39 Pred 25 mg daily   1/20/21 4 2 Right 41, Left 41 Prednisone 10mg daily   3/22/21 xxx xxx xxx Thymectomy   3/31/21   5 9 Right 44, Left 46 IVIG prior to thymectomy 2g/kg over 3 days (03/15-17/2021),  Prednisone 5mg daily   7/14/21 3 4 Right 43, Left 44 Pred 5 mg   3/14/22 3 Not tested (video) Not tested (video) Pred 5 mg   6/22 xxx xxx xxx Weaned off prednisone   2/8/23 7 8 Right 32 kg, left 36 Kg None (pred 20 mg started just 2 days ago)     Assessment:    Leonidas Pratt is a 58 year old man with ACHR ab positive generalized non-thymomatous myasthenia gravis now 2 years out from thymectomy (3/22/21). Unfortunately he has recently relapsed. Deterioration is well documented on worsening MRC,  strength, MG-ADL and MG composite scores. Although resumption of prednisone has been started, this is not a good long term solution.  He has a recent -2.4  on DEXA. We discussed pros and cons of vyvgart vs IVIG. Considering strength of the recent randomized placebo controlled trial will proceed with vyvgart weekly x 4, with repeated infusions cycles depending on durability of response. I remain optimistic that we can eventually reduce prednisone back to his previous dose, but at this time a more aggressive immuotherapy approach is clearly needed.       Plan:      1. Immunotherapy:   - Prednisone: Continue 20 mg daily for now. If symptoms worsen before starting vyvgart then may need to increase. I anticipate a fairly rapid taper can be achieved once vyvgart is started.   - Start vyvgart 10 mg/kg weekly x 4. Repeat cycle anticipated in 8-9 weeks, but will move sooner or later depending on clinical response  2. Thymectomy: Completed 3/22/21. Benefits from thymectomy may take 1-3 years to be fully appreciated. If disease continues to worsen or is not controlled with #1 will repeat CT chest to look for residual thymus tissue  3. Supportive care:  - Mestinon: Continue 60 mg BID to TID  4. Potential MG triggers including heat, surgery, and illness. Certain medications and over the counter preparations may also cause worsening of MG symptoms. A list of cautionary medications can be found at: [email or web address]  5. Follow up 2-3 months. Sooner if needed.    -----          Sincerely,    Rj Jackson MD

## 2023-02-10 ENCOUNTER — OFFICE VISIT (OUTPATIENT)
Dept: FAMILY MEDICINE | Facility: CLINIC | Age: 58
End: 2023-02-10
Payer: COMMERCIAL

## 2023-02-10 ENCOUNTER — TELEPHONE (OUTPATIENT)
Dept: ENDOCRINOLOGY | Facility: CLINIC | Age: 58
End: 2023-02-10

## 2023-02-10 VITALS
OXYGEN SATURATION: 98 % | HEIGHT: 68 IN | WEIGHT: 182.2 LBS | HEART RATE: 78 BPM | SYSTOLIC BLOOD PRESSURE: 126 MMHG | BODY MASS INDEX: 27.61 KG/M2 | DIASTOLIC BLOOD PRESSURE: 79 MMHG

## 2023-02-10 DIAGNOSIS — D84.9 IMMUNOSUPPRESSION (H): ICD-10-CM

## 2023-02-10 DIAGNOSIS — E78.00 HIGH CHOLESTEROL: ICD-10-CM

## 2023-02-10 DIAGNOSIS — J20.9 ACUTE BRONCHITIS, UNSPECIFIED ORGANISM: ICD-10-CM

## 2023-02-10 DIAGNOSIS — L98.9 SKIN LESIONS: ICD-10-CM

## 2023-02-10 DIAGNOSIS — E03.9 HYPOTHYROIDISM, UNSPECIFIED TYPE: Primary | ICD-10-CM

## 2023-02-10 DIAGNOSIS — G70.01 MYASTHENIA GRAVIS WITH EXACERBATION (H): ICD-10-CM

## 2023-02-10 DIAGNOSIS — M85.80 OSTEOPENIA, UNSPECIFIED LOCATION: ICD-10-CM

## 2023-02-10 PROCEDURE — 99214 OFFICE O/P EST MOD 30 MIN: CPT | Performed by: FAMILY MEDICINE

## 2023-02-10 RX ORDER — ALBUTEROL SULFATE 90 UG/1
2 AEROSOL, METERED RESPIRATORY (INHALATION) EVERY 6 HOURS PRN
Qty: 18 G | Refills: 1 | Status: SHIPPED | OUTPATIENT
Start: 2023-02-10

## 2023-02-10 NOTE — PROGRESS NOTES
"  The 10-year ASCVD risk score (Osman MAZA, et al., 2019) is: 9.4%    Values used to calculate the score:      Age: 58 years      Sex: Male      Is Non- : No      Diabetic: No      Tobacco smoker: No      Systolic Blood Pressure: 126 mmHg      Is BP treated: No      HDL Cholesterol: 35 mg/dL      Total Cholesterol: 231 mg/dL        Assessment & Plan     Acute bronchitis, unspecified organism  Uses rarely  - albuterol (PROAIR HFA/PROVENTIL HFA/VENTOLIN HFA) 108 (90 Base) MCG/ACT inhaler; Inhale 2 puffs into the lungs every 6 hours as needed for shortness of breath or wheezing    Hypothyroidism, unspecified type  See HPI  - Adult Endocrinology  Referral; Future    Osteopenia, unspecified location  Same  - Adult Endocrinology  Referral; Future    Skin lesions  [facility]  - Adult Dermatology Referral; Future    Myasthenia gravis with exacerbation (H)  Per Neuro, eval/treat rvwd, will be immune suppressed ongoing if sticks w/ plan so paxlovid if covid    High cholesterol  Consider statin        37 minutes spent on the date of the encounter doing chart review, history and exam, documentation and further activities per the note    BMI:   Estimated body mass index is 28.12 kg/m  as calculated from the following:    Height as of this encounter: 1.715 m (5' 7.5\").    Weight as of this encounter: 82.6 kg (182 lb 3.2 oz).     Moreno Marion MD  [facility] [place]    Shireen Lauren is a 58 year old, presenting for the following health issues:  Follow Up and Results (Pt would like to discuss lab work)      HPI   Hypothyroid: last labs showed tsh low, cut back dose just a few weeks ago, will redo labs in the spring on lower dose also discuss w/ Endo  Osteopenia: dexa done in setting of sporadic pred use for MG, plus dad had idiopathic osteoporosis, he'll also discuss w/ endo  MG: neuro notes/plans rvwd, on pred now and new med is immune " supporessing, if he gets covid will notify us quickly so can start paxlovid  Hi chol; rvwd ascvd score offered statin he'll think about  Discussed could do prev20 (given immune supp meds), also overdue boostrix, second shingrix, he knows can do at drugstore too  FH skin ca, multiple skin lesions  Past Medical History:   Diagnosis Date     Asthma      Kidney stone      Myasthenia gravis (H)      Strabismus      Thyroid disease      Past Surgical History:   Procedure Laterality Date     COLONOSCOPY N/A 1/13/2023    Procedure: COLONOSCOPY;  Surgeon: Trevor Perez MD;  Location: [facility] OR     HERNIA REPAIR       LASER HOLMIUM LITHOTRIPSY URETER(S), INSERT STENT, COMBINED Right 10/15/2014    Procedure: COMBINED CYSTOSCOPY, URETEROSCOPY, LASER HOLMIUM LITHOTRIPSY URETER(S), INSERT STENT;  Surgeon: Thong Bates MD;  [facility]: [facility] OR     RECESSION RESECTION WITH ADJUSTABLE SUTURE  9/4/2012    LLRc 5.0mm on adj. LIRc 4.5mm on adj.     RECESSION RESECTION WITH ADJUSTABLE SUTURE BILATERAL  12/18/2012    RSRc 3.0mm; adj. suture     STRABISMUS SURGERY       THORACOSCOPIC THYMECTOMY N/A 3/22/2021    Procedure: SUBXIPHOID BILATERAL THORACOSCOPIC THYMECTOMY;  Surgeon: Benjy Hull MD;  Location: [facility] OR     Current Outpatient Medications   Medication     albuterol (PROAIR HFA/PROVENTIL HFA/VENTOLIN HFA) 108 (90 Base) MCG/ACT inhaler     apraclonidine (IOPIDINE) 0.5 % ophthalmic solution     levothyroxine (SYNTHROID/LEVOTHROID) 137 MCG tablet     predniSONE (DELTASONE) 10 MG tablet     pyridostigmine (MESTINON) 60 MG tablet     Vitamin D, Cholecalciferol, 25 MCG (1000 UT) TABS     bisacodyl (DULCOLAX) 5 MG EC tablet     levothyroxine (SYNTHROID/LEVOTHROID) 150 MCG tablet     polyethylene glycol (GOLYTELY) 236 g suspension     predniSONE (DELTASONE) 1 MG tablet     predniSONE (DELTASONE) 5 MG tablet     predniSONE (DELTASONE) 5 MG tablet     pyridostigmine (MESTINON) 60 MG tablet     No current  "facility-administered medications for this visit.     Allergies   Allergen Reactions     Nkda [No Known Drug Allergies]        Review of Systems         Objective    /79 (BP Location: Right arm, Patient Position: Sitting, Cuff Size: Adult Regular)   Pulse 78   Ht 1.715 m (5' 7.5\")   Wt 82.6 kg (182 lb 3.2 oz)   SpO2 98%   BMI 28.12 kg/m    Body mass index is 28.12 kg/m .  Physical Exam                     "

## 2023-02-10 NOTE — TELEPHONE ENCOUNTER
[facility] Call Center    Phone Message    May a detailed message be left on voicemail: yes     Reason for Call: Other: Patient is being referred to be seen for Hypothyroidism, unspecified type; osteopenia. Ref by Dr Marion. Patient is currently scheduled on 6/27/23 at 1:00pm with Dr Dick. Sending encounter to clinic for review. Please call patient to schedule if sooner opening is needed     Action Taken: Message routed to:  [facility] ([facility] Endocrinology    Travel Screening: Not Applicable

## 2023-02-10 NOTE — NURSING NOTE
Leonidas Pratt is a 58 year old male patient that presents today in clinic for the following:    Chief Complaint   Patient presents with     Follow Up     Results     Pt would like to discuss lab work     The patient's allergies and medications were reviewed as noted. A set of vitals were recorded as noted without incident. The patient does not have any other questions for the provider.    Beth Bernal, YVONNE at 7:50 AM on 2/10/2023

## 2023-02-19 DIAGNOSIS — U07.1 INFECTION DUE TO 2019 NOVEL CORONAVIRUS: Primary | ICD-10-CM

## 2023-02-23 ENCOUNTER — LAB (OUTPATIENT)
Dept: LAB | Facility: CLINIC | Age: 58
End: 2023-02-23
Payer: COMMERCIAL

## 2023-02-23 DIAGNOSIS — G70.00 MYASTHENIA GRAVIS WITHOUT EXACERBATION (H): ICD-10-CM

## 2023-02-23 PROCEDURE — 36415 COLL VENOUS BLD VENIPUNCTURE: CPT | Performed by: PATHOLOGY

## 2023-02-23 PROCEDURE — 99000 SPECIMEN HANDLING OFFICE-LAB: CPT | Performed by: PATHOLOGY

## 2023-02-23 PROCEDURE — 82784 ASSAY IGA/IGD/IGG/IGM EACH: CPT | Mod: 90 | Performed by: PATHOLOGY

## 2023-02-24 LAB — IGG SERPL-MCNC: 1007 MG/DL (ref 610–1616)

## 2023-03-07 ENCOUNTER — INFUSION THERAPY VISIT (OUTPATIENT)
Dept: INFUSION THERAPY | Facility: CLINIC | Age: 58
End: 2023-03-07
Attending: PSYCHIATRY & NEUROLOGY
Payer: COMMERCIAL

## 2023-03-07 VITALS
OXYGEN SATURATION: 98 % | WEIGHT: 183 LBS | TEMPERATURE: 98.3 F | HEART RATE: 66 BPM | RESPIRATION RATE: 16 BRPM | BODY MASS INDEX: 28.24 KG/M2 | DIASTOLIC BLOOD PRESSURE: 88 MMHG | SYSTOLIC BLOOD PRESSURE: 127 MMHG

## 2023-03-07 DIAGNOSIS — G70.01 MYASTHENIA GRAVIS WITH EXACERBATION (H): Primary | ICD-10-CM

## 2023-03-07 PROCEDURE — 250N000011 HC RX IP 250 OP 636: Performed by: PSYCHIATRY & NEUROLOGY

## 2023-03-07 PROCEDURE — 258N000003 HC RX IP 258 OP 636: Performed by: PSYCHIATRY & NEUROLOGY

## 2023-03-07 PROCEDURE — 96365 THER/PROPH/DIAG IV INF INIT: CPT

## 2023-03-07 PROCEDURE — 96413 CHEMO IV INFUSION 1 HR: CPT

## 2023-03-07 RX ORDER — DIPHENHYDRAMINE HYDROCHLORIDE 50 MG/ML
50 INJECTION INTRAMUSCULAR; INTRAVENOUS
Status: CANCELLED
Start: 2023-03-14

## 2023-03-07 RX ORDER — HEPARIN SODIUM (PORCINE) LOCK FLUSH IV SOLN 100 UNIT/ML 100 UNIT/ML
5 SOLUTION INTRAVENOUS
Status: CANCELLED | OUTPATIENT
Start: 2023-03-14

## 2023-03-07 RX ORDER — EPINEPHRINE 1 MG/ML
0.3 INJECTION, SOLUTION INTRAMUSCULAR; SUBCUTANEOUS EVERY 5 MIN PRN
Status: CANCELLED | OUTPATIENT
Start: 2023-03-14

## 2023-03-07 RX ORDER — HEPARIN SODIUM,PORCINE 10 UNIT/ML
5 VIAL (ML) INTRAVENOUS
Status: CANCELLED | OUTPATIENT
Start: 2023-03-14

## 2023-03-07 RX ORDER — METHYLPREDNISOLONE SODIUM SUCCINATE 125 MG/2ML
125 INJECTION, POWDER, LYOPHILIZED, FOR SOLUTION INTRAMUSCULAR; INTRAVENOUS
Status: CANCELLED
Start: 2023-03-14

## 2023-03-07 RX ORDER — ALBUTEROL SULFATE 90 UG/1
1-2 AEROSOL, METERED RESPIRATORY (INHALATION)
Status: CANCELLED
Start: 2023-03-14

## 2023-03-07 RX ORDER — ALBUTEROL SULFATE 0.83 MG/ML
2.5 SOLUTION RESPIRATORY (INHALATION)
Status: CANCELLED | OUTPATIENT
Start: 2023-03-14

## 2023-03-07 RX ORDER — MEPERIDINE HYDROCHLORIDE 25 MG/ML
25 INJECTION INTRAMUSCULAR; INTRAVENOUS; SUBCUTANEOUS EVERY 30 MIN PRN
Status: CANCELLED | OUTPATIENT
Start: 2023-03-14

## 2023-03-07 RX ADMIN — EFGARTIGIMOD ALFA 800 MG: 20 INJECTION INTRAVENOUS at 07:39

## 2023-03-07 ASSESSMENT — PAIN SCALES - GENERAL: PAINLEVEL: NO PAIN (0)

## 2023-03-07 NOTE — LETTER
Date:March 8, 2023      Provider requested that no letter be sent. Do not send.       [facility]

## 2023-03-07 NOTE — PROGRESS NOTES
Nursing Note  Leonidas Pratt [facility] for:   Chief Complaint   Patient presents with     Infusion     Vygart     During today's [facility] appointment, orders from Dr. Jackson were completed.  Frequency: Every 7 days for a total of 4 doses. Today is dose 1 of 4.     Progress note:  Patient identification verified by name and date of birth.  Assessment completed.  Vitals recorded in Doc Flowsheets.  Patient was provided with education regarding medication/procedure and possible side effects.  Patient verbalized understanding.     present during visit today: Not Applicable.    Treatment Conditions: ~~~ NOTE: If the patient answers yes to any of the questions below, hold the infusion and contact ordering provider or on-call provider.    1. Have you recently had an elevated temperature, fever, chills, productive cough, coughing for 3 weeks or longer or hemoptysis, abnormal vital signs, night sweats,  chest pain or have you noticed a decrease in your appetite, unexplained weight loss or fatigue? No  2. Do you have any open wounds or new incisions? No  3. Do you have any recent or upcoming hospitalizations, surgeries or dental procedures? No  4. Do you currently have or recently have had any signs of illness or infection or are you on any antibiotics? No  5. Have you had any new, sudden or worsening abdominal pain? No  6. Have you or anyone in your household received a live vaccination in the past 4 weeks? Please note:  No live vaccines while on biologic/chemotherapy until 6 months after the last treatment.  Patient can receive the flu vaccine (shot only) and the pneumovax.  It is optimal for the patient to get these vaccines mid cycle, but they can be given at any time as long as it is not on the day of the infusion. No  7. Have you recently been diagnosed with any new nervous system diseases (ie. Multiple sclerosis, Guillain [place],  seizures, neurological changes) or cancer diagnosis? No  8. Are you on any form of radiation or chemotherapy? No  9. Are you pregnant or breast feeding or do you have plans of pregnancy in the future? No  10. Have you been having any signs of worsening depression or suicidal ideations?  (benlysta only) N/A  11. Have there been any other new onset medical symptoms? No      Premedications: were not ordered.    Drug Waste Record: No    Infusion length and rate:  infusion given over approximately 60 minutes    Labs: were not ordered for this appointment.    Vascular access: peripheral IV placed today.    Is the next appt scheduled? 03/07/23    Post Infusion Assessment:  Patient tolerated infusion without incident.  Patient observed for 60 minutes post Vygart per protocol.  Site patent and intact, free from redness, edema or discomfort.  No evidence of extravasations.  Access discontinued per protocol.     Biologic Infusion Post Education: Call the triage nurse at your clinic or seek medical attention if you have chills and/or temperature greater than or equal to 100.5, uncontrolled nausea/vomiting, diarrhea, constipation, dizziness, shortness of breath, chest pain, heart palpitations, weakness or any other new or concerning symptoms, questions or concerns.  You cannot have any live virus vaccines prior to or during treatment or up to 6 months post infusion.  If you have an upcoming surgery, medical procedure or dental procedure during treatment, this should be discussed with your ordering physician and your surgeon/dentist.  If you are having any concerning symptom, if you are unsure if you should get your next infusion or wish to speak to a provider before your next infusion, please call your care coordinator or triage nurse at your clinic to notify them so we can adequately serve you.     Discharge Plan:   Follow up plan of care with: ongoing infusions at [facility].  Discharge  instructions were reviewed with patient.  Patient/representative verbalized understanding of discharge instructions and all questions answered.  Patient discharged from [facility] in stable condition.    Avtar Paez RN       Administrations This Visit     efgartigimod terry-fcab (VYVGART) 800 mg in sodium chloride 0.9 % 125 mL infusion     Admin Date  03/07/2023 Action  $New Bag Dose  800 mg Rate  125 mL/hr Route  Intravenous Administered By  Avtar Paez, THIAGO                /88   Pulse 66   Temp 98.3  F (36.8  C)   Resp 16   Wt 83 kg (183 lb)   SpO2 98%   BMI 28.24 kg/m

## 2023-03-07 NOTE — LETTER
3/7/2023         RE: Leonidas Pratt  [address]  [address]  [place] [address]        Dear Colleague,    Thank you for referring your patient, Leonidas Pratt, to the [facility]. Please see a copy of my visit note below.    Nursing Note  Leonidas Pratt [facility] for:   Chief Complaint   Patient presents with     Infusion     Vygart     During [facility]'s [facility] appointment, orders from Dr. Jackson were completed.  Frequency: Every 7 days for a total of 4 doses. Today is dose 1 of 4.     Progress note:  Patient identification verified by name and date of birth.  Assessment completed.  Vitals recorded in Doc Flowsheets.  Patient was provided with education regarding medication/procedure and possible side effects.  Patient verbalized understanding.     present during visit today: Not Applicable.    Treatment Conditions: ~~~ NOTE: If the patient answers yes to any of the questions below, hold the infusion and contact ordering provider or on-call provider.    1. Have you recently had an elevated temperature, fever, chills, productive cough, coughing for 3 weeks or longer or hemoptysis, abnormal vital signs, night sweats,  chest pain or have you noticed a decrease in your appetite, unexplained weight loss or fatigue? No  2. Do you have any open wounds or new incisions? No  3. Do you have any recent or upcoming hospitalizations, surgeries or dental procedures? No  4. Do you currently have or recently have had any signs of illness or infection or are you on any antibiotics? No  5. Have you had any new, sudden or worsening abdominal pain? No  6. Have you or anyone in your household received a live vaccination in the past 4 weeks? Please note:  No live vaccines while on biologic/chemotherapy until 6 months after the last treatment.  Patient can receive the flu vaccine (shot  only) and the pneumovax.  It is optimal for the patient to get these vaccines mid cycle, but they can be given at any time as long as it is not on the day of the infusion. No  7. Have you recently been diagnosed with any new nervous system diseases (ie. Multiple sclerosis, Guillain [place], seizures, neurological changes) or cancer diagnosis? No  8. Are you on any form of radiation or chemotherapy? No  9. Are you pregnant or breast feeding or do you have plans of pregnancy in the future? No  10. Have you been having any signs of worsening depression or suicidal ideations?  (benlysta only) N/A  11. Have there been any other new onset medical symptoms? No      Premedications: were not ordered.    Drug Waste Record: No    Infusion length and rate:  infusion given over approximately 60 minutes    Labs: were not ordered for this appointment.    Vascular access: peripheral IV placed today.    Is the next appt scheduled? 03/07/23    Post Infusion Assessment:  Patient tolerated infusion without incident.  Patient observed for 60 minutes post Vygart per protocol.  Site patent and intact, free from redness, edema or discomfort.  No evidence of extravasations.  Access discontinued per protocol.     Biologic Infusion Post Education: Call the triage nurse at your clinic or seek medical attention if you have chills and/or temperature greater than or equal to 100.5, uncontrolled nausea/vomiting, diarrhea, constipation, dizziness, shortness of breath, chest pain, heart palpitations, weakness or any other new or concerning symptoms, questions or concerns.  You cannot have any live virus vaccines prior to or during treatment or up to 6 months post infusion.  If you have an upcoming surgery, medical procedure or dental procedure during treatment, this should be discussed with your ordering physician and your surgeon/dentist.  If you are having any concerning symptom, if you are unsure if you should get your next infusion or wish to  speak to a provider before your next infusion, please call your care coordinator or triage nurse at your clinic to notify them so we can adequately serve you.     Discharge Plan:   Follow up plan of care with: ongoing infusions at [facility].  Discharge instructions were reviewed with patient.  Patient/representative verbalized understanding of discharge instructions and all questions answered.  Patient discharged [facility] in stable condition.    Avtar Paez RN       Administrations This Visit     efgartigimod terry-fcab (VYVGART) 800 mg in sodium chloride 0.9 % 125 mL infusion     Admin Date  03/07/2023 Action  $New Bag Dose  800 mg Rate  125 mL/hr Route  Intravenous Administered By  Avtar Paez, THIAGO                /88   Pulse 66   Temp 98.3  F (36.8  C)   Resp 16   Wt 83 kg (183 lb)   SpO2 98%   BMI 28.24 kg/m          Again, thank you for allowing me to participate in the care of your patient.        Sincerely,        Specialty Infusion Nurse

## 2023-03-07 NOTE — PATIENT INSTRUCTIONS
Dear Leonidas Pratt    Thank you for choosing [facility] ([facility]) for your infusion.  The following information is a summary of our appointment as well as important reminders.      EDUCATION POST BIOLOGICAL/CHEMOTHERAPY INFUSION  Call the triage nurse at your clinic or seek medical attention if you have chills and/or temperature greater than or equal to 100.5, uncontrolled nausea/vomiting, diarrhea, constipation, dizziness, shortness of breath, chest pain, heart palpitations, weakness or any other new or concerning symptoms, questions or concerns.  You can not have any live virus vaccines prior to or during treatment or up to 6 months post infusion.  If you have an upcoming surgery, medical procedure or dental procedure during treatment, this should be discussed with your ordering physician and your surgeon/dentist.  If you are having any concerning symptom, if you are unsure if you should get your next infusion or wish to speak to a provider before your next infusion, please call your care coordinator or triage nurse at your clinic to notify them so we can adequately serve you.     We look forward in seeing you on your next appointment here at [facility] ([facility]).  Please don t hesitate to call us at [phone] to reschedule any of your appointments or to speak with one of the [facility] registered nurses.  It was a pleasure taking care of you today.    Sincerely,    [facility]  [facility]  [address] [place] [address]  [address]  Phone:  [phone]

## 2023-03-14 ENCOUNTER — INFUSION THERAPY VISIT (OUTPATIENT)
Dept: INFUSION THERAPY | Facility: CLINIC | Age: 58
End: 2023-03-14
Attending: PSYCHIATRY & NEUROLOGY
Payer: COMMERCIAL

## 2023-03-14 VITALS
WEIGHT: 183.5 LBS | TEMPERATURE: 98 F | RESPIRATION RATE: 16 BRPM | HEART RATE: 74 BPM | SYSTOLIC BLOOD PRESSURE: 122 MMHG | DIASTOLIC BLOOD PRESSURE: 81 MMHG | BODY MASS INDEX: 28.32 KG/M2 | OXYGEN SATURATION: 94 %

## 2023-03-14 DIAGNOSIS — G70.01 MYASTHENIA GRAVIS WITH EXACERBATION (H): Primary | ICD-10-CM

## 2023-03-14 PROCEDURE — 250N000011 HC RX IP 250 OP 636: Performed by: PSYCHIATRY & NEUROLOGY

## 2023-03-14 PROCEDURE — 258N000003 HC RX IP 258 OP 636: Performed by: PSYCHIATRY & NEUROLOGY

## 2023-03-14 PROCEDURE — 96413 CHEMO IV INFUSION 1 HR: CPT

## 2023-03-14 RX ORDER — EPINEPHRINE 1 MG/ML
0.3 INJECTION, SOLUTION INTRAMUSCULAR; SUBCUTANEOUS EVERY 5 MIN PRN
Status: CANCELLED | OUTPATIENT
Start: 2023-03-21

## 2023-03-14 RX ORDER — METHYLPREDNISOLONE SODIUM SUCCINATE 125 MG/2ML
125 INJECTION, POWDER, LYOPHILIZED, FOR SOLUTION INTRAMUSCULAR; INTRAVENOUS
Status: CANCELLED
Start: 2023-03-21

## 2023-03-14 RX ORDER — DIPHENHYDRAMINE HYDROCHLORIDE 50 MG/ML
50 INJECTION INTRAMUSCULAR; INTRAVENOUS
Status: CANCELLED
Start: 2023-03-21

## 2023-03-14 RX ORDER — MEPERIDINE HYDROCHLORIDE 25 MG/ML
25 INJECTION INTRAMUSCULAR; INTRAVENOUS; SUBCUTANEOUS EVERY 30 MIN PRN
Status: CANCELLED | OUTPATIENT
Start: 2023-03-21

## 2023-03-14 RX ORDER — HEPARIN SODIUM,PORCINE 10 UNIT/ML
5 VIAL (ML) INTRAVENOUS
Status: CANCELLED | OUTPATIENT
Start: 2023-03-21

## 2023-03-14 RX ORDER — HEPARIN SODIUM (PORCINE) LOCK FLUSH IV SOLN 100 UNIT/ML 100 UNIT/ML
5 SOLUTION INTRAVENOUS
Status: CANCELLED | OUTPATIENT
Start: 2023-03-21

## 2023-03-14 RX ORDER — ALBUTEROL SULFATE 90 UG/1
1-2 AEROSOL, METERED RESPIRATORY (INHALATION)
Status: CANCELLED
Start: 2023-03-21

## 2023-03-14 RX ORDER — ALBUTEROL SULFATE 0.83 MG/ML
2.5 SOLUTION RESPIRATORY (INHALATION)
Status: CANCELLED | OUTPATIENT
Start: 2023-03-21

## 2023-03-14 RX ADMIN — EFGARTIGIMOD ALFA 800 MG: 20 INJECTION INTRAVENOUS at 12:56

## 2023-03-14 ASSESSMENT — PAIN SCALES - GENERAL: PAINLEVEL: NO PAIN (0)

## 2023-03-14 NOTE — PROGRESS NOTES
Infusion Nursing Note:  Leonidas Pratt presents today for   Chief Complaint   Patient presents with     Infusion     efgartigimod terry-fcab (VYVGART)       Patient seen by provider today: No   present during visit today: Not Applicable.    Note:   -Orders from Rj Jackson MD completed. Frequency: weekly x4; today is dose 2/4.  -800mg Vygart infused over 60min.  -Patient observed x60min post-infusion per protocol.    Intravenous Access:  Peripheral IV placed in Lt lower forearm by RN.    Treatment Conditions:  Biological Infusion Checklist:  ~~~ NOTE: If the patient answers yes to any of the questions below, hold the infusion and contact ordering provider or on-call provider.    1. Have you recently had an elevated temperature, fever, chills, productive cough, coughing for 3 weeks or longer or hemoptysis, abnormal vital signs, night sweats,  chest pain or have you noticed a decrease in your appetite, unexplained weight loss or fatigue? No  2. Do you have any open wounds or new incisions? No  3. Do you have any recent or upcoming hospitalizations, surgeries or dental procedures? No  4. Do you currently have or recently have had any signs of illness or infection or are you on any antibiotics? No  5. Have you had any new, sudden or worsening abdominal pain? No  6. Have you or anyone in your household received a live vaccination in the past 4 weeks? Please note:  No live vaccines while on biologic/chemotherapy until 6 months after the last treatment.  Patient can receive the flu vaccine (shot only) and the pneumovax.  It is optimal for the patient to get these vaccines mid cycle, but they can be given at any time as long as it is not on the day of the infusion. No  7. Have you recently been diagnosed with any new nervous system diseases (ie. Multiple sclerosis, Guillain [place], seizures, neurological changes) or cancer diagnosis? No  8. Are you on any form of radiation or chemotherapy? No  9. Are you  pregnant or breast feeding or do you have plans of pregnancy in the future? N/A  10. Have you been having any signs of worsening depression or suicidal ideations?  (benlysta only) N/A  11. Have there been any other new onset medical symptoms? No    Post Infusion Assessment:  Patient tolerated infusion without incident.  Blood return noted pre and post infusion.  Site patent and intact, free from redness, edema or discomfort.  No evidence of extravasations.  Access discontinued per protocol.     Discharge Plan:   Discharge instructions reviewed with: Patient.  Patient and/or family verbalized understanding of discharge instructions and all questions answered.  AVS to patient via [employer].  Patient will return 3/21/23 for next appointment.   Patient discharged in stable condition accompanied by: self.  Departure Mode: Ambulatory.      Tigist Fisher RN    /86 (BP Location: Left arm, Patient Position: Sitting, Cuff Size: Adult Regular)   Pulse 74   Temp 98  F (36.7  C)   Resp 16   Wt 83.2 kg (183 lb 8 oz)   SpO2 94%   BMI 28.32 kg/m      Administrations This Visit     efgartigimod terry-fcab (VYVGART) 800 mg in sodium chloride 0.9 % 125 mL infusion     Admin Date  03/14/2023 Action  $New Bag Dose  800 mg Rate  125 mL/hr Route  Intravenous Administered By  Tigist Fisher RN

## 2023-03-14 NOTE — PATIENT INSTRUCTIONS
Dear Leonidas Pratt    Thank you for choosing [facility] ([facility]) for your Vygart infusion.  The following information is a summary of your appointment as well as important reminders.      EDUCATION POST BIOLOGICAL/CHEMOTHERAPY INFUSION  Call the triage nurse at your clinic or seek medical attention if you have chills and/or temperature greater than or equal to 100.5, uncontrolled nausea/vomiting, diarrhea, constipation, dizziness, shortness of breath, chest pain, heart palpitations, weakness or any other new or concerning symptoms, questions or concerns.  You can not have any live virus vaccines prior to or during treatment or up to 6 months post infusion.  If you have an upcoming surgery, medical procedure or dental procedure during treatment, this should be discussed with your ordering physician and your surgeon/dentist.  If you are having any concerning symptom, if you are unsure if you should get your next infusion or wish to speak to a provider before your next infusion, please call your care coordinator or triage nurse at your clinic to notify them so we can adequately serve you.     We look forward to seeing you on 3/21/23 for your next appointment here at [facility] ([facility]).  Please don t hesitate to call us at [phone] to reschedule any of your appointments or to speak with one of the [facility] registered nurses.  It was a pleasure taking care of you today.    Sincerely,    [facility]  [facility]  [address] [place] [address]  [address]  Phone:  [phone]

## 2023-03-21 ENCOUNTER — INFUSION THERAPY VISIT (OUTPATIENT)
Dept: INFUSION THERAPY | Facility: CLINIC | Age: 58
End: 2023-03-21
Attending: PSYCHIATRY & NEUROLOGY
Payer: COMMERCIAL

## 2023-03-21 VITALS
SYSTOLIC BLOOD PRESSURE: 137 MMHG | HEART RATE: 65 BPM | BODY MASS INDEX: 28.3 KG/M2 | DIASTOLIC BLOOD PRESSURE: 76 MMHG | RESPIRATION RATE: 16 BRPM | TEMPERATURE: 97.6 F | OXYGEN SATURATION: 94 % | WEIGHT: 183.4 LBS

## 2023-03-21 DIAGNOSIS — G70.01 MYASTHENIA GRAVIS WITH EXACERBATION (H): Primary | ICD-10-CM

## 2023-03-21 DIAGNOSIS — E03.9 HYPOTHYROIDISM: ICD-10-CM

## 2023-03-21 LAB — TSH SERPL DL<=0.005 MIU/L-ACNC: 0.7 UIU/ML (ref 0.3–4.2)

## 2023-03-21 PROCEDURE — 258N000003 HC RX IP 258 OP 636: Performed by: PSYCHIATRY & NEUROLOGY

## 2023-03-21 PROCEDURE — 250N000011 HC RX IP 250 OP 636: Performed by: PSYCHIATRY & NEUROLOGY

## 2023-03-21 PROCEDURE — 84443 ASSAY THYROID STIM HORMONE: CPT

## 2023-03-21 PROCEDURE — 96413 CHEMO IV INFUSION 1 HR: CPT

## 2023-03-21 PROCEDURE — 36415 COLL VENOUS BLD VENIPUNCTURE: CPT

## 2023-03-21 RX ORDER — HEPARIN SODIUM,PORCINE 10 UNIT/ML
5 VIAL (ML) INTRAVENOUS
Status: CANCELLED | OUTPATIENT
Start: 2023-03-28

## 2023-03-21 RX ORDER — DIPHENHYDRAMINE HYDROCHLORIDE 50 MG/ML
50 INJECTION INTRAMUSCULAR; INTRAVENOUS
Status: CANCELLED
Start: 2023-03-28

## 2023-03-21 RX ORDER — HEPARIN SODIUM (PORCINE) LOCK FLUSH IV SOLN 100 UNIT/ML 100 UNIT/ML
5 SOLUTION INTRAVENOUS
Status: CANCELLED | OUTPATIENT
Start: 2023-03-28

## 2023-03-21 RX ORDER — MEPERIDINE HYDROCHLORIDE 25 MG/ML
25 INJECTION INTRAMUSCULAR; INTRAVENOUS; SUBCUTANEOUS EVERY 30 MIN PRN
Status: CANCELLED | OUTPATIENT
Start: 2023-03-28

## 2023-03-21 RX ORDER — EPINEPHRINE 1 MG/ML
0.3 INJECTION, SOLUTION INTRAMUSCULAR; SUBCUTANEOUS EVERY 5 MIN PRN
Status: CANCELLED | OUTPATIENT
Start: 2023-03-28

## 2023-03-21 RX ORDER — METHYLPREDNISOLONE SODIUM SUCCINATE 125 MG/2ML
125 INJECTION, POWDER, LYOPHILIZED, FOR SOLUTION INTRAMUSCULAR; INTRAVENOUS
Status: CANCELLED
Start: 2023-03-28

## 2023-03-21 RX ORDER — ALBUTEROL SULFATE 90 UG/1
1-2 AEROSOL, METERED RESPIRATORY (INHALATION)
Status: CANCELLED
Start: 2023-03-28

## 2023-03-21 RX ORDER — ALBUTEROL SULFATE 0.83 MG/ML
2.5 SOLUTION RESPIRATORY (INHALATION)
Status: CANCELLED | OUTPATIENT
Start: 2023-03-28

## 2023-03-21 RX ADMIN — EFGARTIGIMOD ALFA 832 MG: 20 INJECTION INTRAVENOUS at 12:31

## 2023-03-21 NOTE — PROGRESS NOTES
Nursing Note  Leonidas Pratt [facility] for:   Chief Complaint   Patient presents with     Infusion     Vyvgart     During [facility]'s [facility] appointment, orders from Dr. Rj Jackson were completed.  Frequency: every 7 days x 4 doses. Today is dose 3/4.    Progress note:  Patient identification verified by name and date of birth.  Assessment completed.  Vitals recorded in Doc Flowsheets.  Patient was provided with education regarding medication/procedure and possible side effects.  Patient verbalized understanding.     present during visit today: Not Applicable.    Treatment Conditions: ~~~ NOTE: If the patient answers yes to any of the questions below, hold the infusion and contact ordering provider or on-call provider.    1. Have you recently had an elevated temperature, fever, chills, productive cough, coughing for 3 weeks or longer or hemoptysis, abnormal vital signs, night sweats,  chest pain or have you noticed a decrease in your appetite, unexplained weight loss or fatigue? No  2. Do you have any open wounds or new incisions? No  3. Do you have any recent or upcoming hospitalizations, surgeries or dental procedures? No  4. Do you currently have or recently have had any signs of illness or infection or are you on any antibiotics? No  5. Have you had any new, sudden or worsening abdominal pain? No  6. Have you or anyone in your household received a live vaccination in the past 4 weeks? Please note:  No live vaccines while on biologic/chemotherapy until 6 months after the last treatment.  Patient can receive the flu vaccine (shot only) and the pneumovax.  It is optimal for the patient to get these vaccines mid cycle, but they can be given at any time as long as it is not on the day of the infusion. No  7. Have you recently been diagnosed with any new nervous system diseases (ie. Multiple sclerosis, Guillain [place],  seizures, neurological changes) or cancer diagnosis? No  8. Are you on any form of radiation or chemotherapy? No  9. Are you pregnant or breast feeding or do you have plans of pregnancy in the future? No  10. Have you been having any signs of worsening depression or suicidal ideations?  (benlysta only) NA  11. Have there been any other new onset medical symptoms? No      Premedications: were not ordered.    Drug Waste Record: No    Infusion length and rate:  infusion given over approximately 60 minutes at 125 ml/hr.  Patient observed for additional 60 minutes following infusion completion per orders.    Labs: TSH drawn per orders    Vascular access: peripheral IV placed today.    Is the next appt scheduled? 3/28    Post Infusion Assessment:  Patient tolerated infusion without incident. Observed for 60 minutes post infusion completion and continued to tolerate well. Discharged from [facility] in stable condition. AVS to [facility].     Discharge Plan:   Follow up plan of care with: ongoing infusions at [facility]., ordering provider as scheduled. and AVS to [facility]  Discharge instructions were reviewed with patient.  Patient/representative verbalized understanding of discharge instructions and all questions answered.  Patient discharged [facility] in stable condition.    Makeda Marinelli, THIAGO       Administrations This Visit     efgartigimod terry-fcab (VYVGART) 832 mg in sodium chloride 0.9 % 125 mL infusion     Admin Date  03/21/2023 Action  $New Bag Dose  832 mg Rate  125 mL/hr Route  Intravenous Administered By  Makeda Marinelli, RN                /76   Pulse 65   Temp 97.6  F (36.4  C) (Oral)   Resp 16   Wt 83.2 kg (183 lb 6.4 oz)   SpO2 94%   BMI 28.30 kg/m

## 2023-03-21 NOTE — PATIENT INSTRUCTIONS
Dear Leonidas Pratt    Thank you for choosing [facility] ([facility]) for your Vyvgart infusion.  The following information is a summary of our appointment as well as important reminders.      EDUCATION POST BIOLOGICAL/CHEMOTHERAPY INFUSION  Call the triage nurse at your clinic or seek medical attention if you have chills and/or temperature greater than or equal to 100.5, uncontrolled nausea/vomiting, diarrhea, constipation, dizziness, shortness of breath, chest pain, heart palpitations, weakness or any other new or concerning symptoms, questions or concerns.  You can not have any live virus vaccines prior to or during treatment or up to 6 months post infusion.  If you have an upcoming surgery, medical procedure or dental procedure during treatment, this should be discussed with your ordering physician and your surgeon/dentist.  If you are having any concerning symptom, if you are unsure if you should get your next infusion or wish to speak to a provider before your next infusion, please call your care coordinator or triage nurse at your clinic to notify them so we can adequately serve you.     We look forward in seeing you on your next appointment here at [facility] ([facility]).  Please don t hesitate to call us at [phone] to reschedule any of your appointments or to speak with one of the [facility] registered nurses.  It was a pleasure taking care of you today.    Sincerely,    [facility]  [facility]  [address] [place] [address]  [address]  Phone:  [phone]

## 2023-03-26 ENCOUNTER — HEALTH MAINTENANCE LETTER (OUTPATIENT)
Age: 58
End: 2023-03-26

## 2023-03-28 ENCOUNTER — INFUSION THERAPY VISIT (OUTPATIENT)
Dept: INFUSION THERAPY | Facility: CLINIC | Age: 58
End: 2023-03-28
Attending: PSYCHIATRY & NEUROLOGY
Payer: COMMERCIAL

## 2023-03-28 VITALS
RESPIRATION RATE: 16 BRPM | HEART RATE: 75 BPM | BODY MASS INDEX: 28.08 KG/M2 | DIASTOLIC BLOOD PRESSURE: 80 MMHG | SYSTOLIC BLOOD PRESSURE: 137 MMHG | OXYGEN SATURATION: 98 % | TEMPERATURE: 98.2 F | WEIGHT: 182 LBS

## 2023-03-28 DIAGNOSIS — G70.01 MYASTHENIA GRAVIS WITH EXACERBATION (H): Primary | ICD-10-CM

## 2023-03-28 DIAGNOSIS — G70.00 MYASTHENIA GRAVIS WITHOUT EXACERBATION (H): ICD-10-CM

## 2023-03-28 PROCEDURE — 96413 CHEMO IV INFUSION 1 HR: CPT

## 2023-03-28 PROCEDURE — 250N000011 HC RX IP 250 OP 636: Performed by: PSYCHIATRY & NEUROLOGY

## 2023-03-28 PROCEDURE — 258N000003 HC RX IP 258 OP 636: Performed by: PSYCHIATRY & NEUROLOGY

## 2023-03-28 PROCEDURE — 96365 THER/PROPH/DIAG IV INF INIT: CPT

## 2023-03-28 RX ORDER — HEPARIN SODIUM (PORCINE) LOCK FLUSH IV SOLN 100 UNIT/ML 100 UNIT/ML
5 SOLUTION INTRAVENOUS
Status: CANCELLED | OUTPATIENT
Start: 2023-03-28

## 2023-03-28 RX ORDER — ALBUTEROL SULFATE 0.83 MG/ML
2.5 SOLUTION RESPIRATORY (INHALATION)
Status: CANCELLED | OUTPATIENT
Start: 2023-03-28

## 2023-03-28 RX ORDER — ALBUTEROL SULFATE 90 UG/1
1-2 AEROSOL, METERED RESPIRATORY (INHALATION)
Status: CANCELLED
Start: 2023-03-28

## 2023-03-28 RX ORDER — EPINEPHRINE 1 MG/ML
0.3 INJECTION, SOLUTION INTRAMUSCULAR; SUBCUTANEOUS EVERY 5 MIN PRN
Status: CANCELLED | OUTPATIENT
Start: 2023-03-28

## 2023-03-28 RX ORDER — DIPHENHYDRAMINE HYDROCHLORIDE 50 MG/ML
50 INJECTION INTRAMUSCULAR; INTRAVENOUS
Status: CANCELLED
Start: 2023-03-28

## 2023-03-28 RX ORDER — METHYLPREDNISOLONE SODIUM SUCCINATE 125 MG/2ML
125 INJECTION, POWDER, LYOPHILIZED, FOR SOLUTION INTRAMUSCULAR; INTRAVENOUS
Status: CANCELLED
Start: 2023-03-28

## 2023-03-28 RX ORDER — MEPERIDINE HYDROCHLORIDE 25 MG/ML
25 INJECTION INTRAMUSCULAR; INTRAVENOUS; SUBCUTANEOUS EVERY 30 MIN PRN
Status: CANCELLED | OUTPATIENT
Start: 2023-03-28

## 2023-03-28 RX ORDER — PYRIDOSTIGMINE BROMIDE 60 MG/1
TABLET ORAL
Qty: 90 TABLET | Refills: 1 | Status: SHIPPED | OUTPATIENT
Start: 2023-03-28 | End: 2023-07-19

## 2023-03-28 RX ORDER — HEPARIN SODIUM,PORCINE 10 UNIT/ML
5 VIAL (ML) INTRAVENOUS
Status: CANCELLED | OUTPATIENT
Start: 2023-03-28

## 2023-03-28 RX ADMIN — EFGARTIGIMOD ALFA 800 MG: 20 INJECTION INTRAVENOUS at 08:02

## 2023-03-28 ASSESSMENT — PAIN SCALES - GENERAL: PAINLEVEL: NO PAIN (0)

## 2023-03-28 NOTE — PROGRESS NOTES
Infusion Nursing Note:  Leonidas Pratt presents today for vyvgart.    Patient seen by provider today: No   present during visit today: Not Applicable.    Note: dose 4/4. Seeing provider 5/3 for follow up.     Intravenous Access:  Peripheral IV placed.    Treatment Conditions:  Biological Infusion Checklist:  ~~~ NOTE: If the patient answers yes to any of the questions below, hold the infusion and contact ordering provider or on-call provider.    1. Have you recently had an elevated temperature, fever, chills, productive cough, coughing for 3 weeks or longer or hemoptysis, abnormal vital signs, night sweats,  chest pain or have you noticed a decrease in your appetite, unexplained weight loss or fatigue? No  2. Do you have any open wounds or new incisions? No  3. Do you have any recent or upcoming hospitalizations, surgeries or dental procedures? No  4. Do you currently have or recently have had any signs of illness or infection or are you on any antibiotics? No  5. Have you had any new, sudden or worsening abdominal pain? No  6. Have you or anyone in your household received a live vaccination in the past 4 weeks? Please note:  No live vaccines while on biologic/chemotherapy until 6 months after the last treatment.  Patient can receive the flu vaccine (shot only) and the pneumovax.  It is optimal for the patient to get these vaccines mid cycle, but they can be given at any time as long as it is not on the day of the infusion. No  7. Have you recently been diagnosed with any new nervous system diseases (ie. Multiple sclerosis, Guillain [place], seizures, neurological changes) or cancer diagnosis? No  8. Are you on any form of radiation or chemotherapy? No  9. Are you pregnant or breast feeding or do you have plans of pregnancy in the future? n/a  10. Have you been having any signs of worsening depression or suicidal ideations?  (benlysta only) n/a  11. Have there been any other new onset medical symptoms?  No      Post Infusion Assessment:  Patient tolerated infusion without incident.  Patient observed for 60 minutes post infusion per protocol.  Blood return noted pre and post infusion.  Site patent and intact, free from redness, edema or discomfort.  No evidence of extravasations.  Access discontinued per protocol.     POST-INFUSION OF BIOLOGICAL MEDICATION:     Reviewed with patient.  Given biologic medication or medication hand-out. Inform patient if any fever, chills or signs of infection, new symptoms, abdominal pain, heart palpitations, shortness of breath, reaction, weakness, neurological changes, seek medical attention immediately and should not receive infusions. No live virus vaccines prior to or during treatment or up to 6 months post infusion. If the patient has an upcoming procedure or surgery, this should be discussed with the rheumatologist and surgeon or provider.    Discharge Plan:   Discharge instructions reviewed with: Patient.  Patient and/or family verbalized understanding of discharge instructions and all questions answered.  AVS to patient via [employer]  Patient discharged in stable condition accompanied by: self.  Departure Mode: Ambulatory.    Administrations This Visit     efgartigimod terry-fcab (VYVGART) 800 mg in sodium chloride 0.9 % 125 mL infusion     Admin Date  03/28/2023 Action  $New Bag Dose  800 mg Rate  125 mL/hr Route  Intravenous Administered By  Irlanda Posadas, RN                Irlanda Posadas, THIAGO

## 2023-03-28 NOTE — PATIENT INSTRUCTIONS
Dear Leonidas Pratt    Thank you for choosing [facility] ([facility]) for your infusion.  The following information is a summary of our appointment as well as important reminders.      EDUCATION POST BIOLOGICAL/CHEMOTHERAPY INFUSION  Call the triage nurse at your clinic or seek medical attention if you have chills and/or temperature greater than or equal to 100.5, uncontrolled nausea/vomiting, diarrhea, constipation, dizziness, shortness of breath, chest pain, heart palpitations, weakness or any other new or concerning symptoms, questions or concerns.  You can not have any live virus vaccines prior to or during treatment or up to 6 months post infusion.  If you have an upcoming surgery, medical procedure or dental procedure during treatment, this should be discussed with your ordering physician and your surgeon/dentist.  If you are having any concerning symptom, if you are unsure if you should get your next infusion or wish to speak to a provider before your next infusion, please call your care coordinator or triage nurse at your clinic to notify them so we can adequately serve you.     We look forward in seeing you on your next appointment here at [facility] ([facility]).  Please don t hesitate to call us at [phone] to reschedule any of your appointments or to speak with one of the [facility] registered nurses.  It was a pleasure taking care of you today.    Sincerely,    [facility]  [facility]  [address] [place] [address]  [address]  Phone:  [phone]

## 2023-04-07 ENCOUNTER — TELEPHONE (OUTPATIENT)
Dept: DERMATOLOGY | Facility: CLINIC | Age: 58
End: 2023-04-07
Payer: COMMERCIAL

## 2023-04-10 ENCOUNTER — TELEPHONE (OUTPATIENT)
Dept: DERMATOLOGY | Facility: CLINIC | Age: 58
End: 2023-04-10

## 2023-04-10 NOTE — TELEPHONE ENCOUNTER
[facility] Call Center    Phone Message    May a detailed message be left on voicemail: yes     Reason for Call: Appointment Intake      Referring Provider Name:   Moreno Marion MD in [facility]     Diagnosis and/or Symptoms:   Skin lesions; Skin Check (Family Hx of Skin Cancer)       Recs in [facility] - no outside Recs     Please review and call Pt if you can move up his Appt    Pt was scheduled on 07/21/23 with Dr Garcia and she cancelled clinic that day    So now Pt rescheduled first opening with any provider to 10/03/23 with Dr Gooden    Pt needs a Tuesday afternoon or Friday morning Appt with any provider - and would like to be moved up and not have to wait now until Oct to be seen    Thank you!      Action Taken: Message routed to:  [facility] ([facility] DERM    Travel Screening: Not Applicable

## 2023-04-11 ENCOUNTER — TELEPHONE (OUTPATIENT)
Dept: DERMATOLOGY | Facility: CLINIC | Age: 58
End: 2023-04-11
Payer: COMMERCIAL

## 2023-04-11 NOTE — TELEPHONE ENCOUNTER
Per in basket patient wanted to know if he could schedule sooner that October. lvm informing patient at this time that was the soonest appointments we have for New Patients & I added him to the wait list.

## 2023-04-12 ENCOUNTER — TELEPHONE (OUTPATIENT)
Dept: NEUROLOGY | Facility: CLINIC | Age: 58
End: 2023-04-12
Payer: COMMERCIAL

## 2023-04-12 RX ORDER — METHYLPREDNISOLONE SODIUM SUCCINATE 125 MG/2ML
125 INJECTION, POWDER, LYOPHILIZED, FOR SOLUTION INTRAMUSCULAR; INTRAVENOUS
Status: CANCELLED
Start: 2023-04-12

## 2023-04-12 RX ORDER — MEPERIDINE HYDROCHLORIDE 25 MG/ML
25 INJECTION INTRAMUSCULAR; INTRAVENOUS; SUBCUTANEOUS EVERY 30 MIN PRN
Status: CANCELLED | OUTPATIENT
Start: 2023-04-12

## 2023-04-12 RX ORDER — DIPHENHYDRAMINE HYDROCHLORIDE 50 MG/ML
50 INJECTION INTRAMUSCULAR; INTRAVENOUS
Status: CANCELLED
Start: 2023-04-12

## 2023-04-12 RX ORDER — HEPARIN SODIUM (PORCINE) LOCK FLUSH IV SOLN 100 UNIT/ML 100 UNIT/ML
5 SOLUTION INTRAVENOUS
Status: CANCELLED | OUTPATIENT
Start: 2023-04-12

## 2023-04-12 RX ORDER — EPINEPHRINE 1 MG/ML
0.3 INJECTION, SOLUTION, CONCENTRATE INTRAVENOUS EVERY 5 MIN PRN
Status: CANCELLED | OUTPATIENT
Start: 2023-04-12

## 2023-04-12 RX ORDER — ALBUTEROL SULFATE 0.83 MG/ML
2.5 SOLUTION RESPIRATORY (INHALATION)
Status: CANCELLED | OUTPATIENT
Start: 2023-04-12

## 2023-04-12 RX ORDER — ALBUTEROL SULFATE 90 UG/1
1-2 AEROSOL, METERED RESPIRATORY (INHALATION)
Status: CANCELLED
Start: 2023-04-12

## 2023-04-12 RX ORDER — HEPARIN SODIUM,PORCINE 10 UNIT/ML
5 VIAL (ML) INTRAVENOUS
Status: CANCELLED | OUTPATIENT
Start: 2023-04-12

## 2023-04-12 NOTE — TELEPHONE ENCOUNTER
Per Dr. Jackson, Leonidas should get set up for the next cycle of Vyvgart 8 weeks after first cycle is completed. Last Vyvgart infusion was 3/28 so next Vyvgart cycle should begin the week of 5/22. New Vyvgart orders routed to Dr. Jackson for signature. Once signed, Leonidas will be instructed to schedule infusions.    Lynda Smith RN

## 2023-04-25 NOTE — TELEPHONE ENCOUNTER
Per Dr. Jackson, Leonidas should begin his next cycle of Vyvgart immediately.  Orders active. [facility] to assist with getting orders scheduled. Infusion finance notified. Leonidas updated via my chart.    Lynda Smith RN

## 2023-05-03 ENCOUNTER — OFFICE VISIT (OUTPATIENT)
Dept: NEUROLOGY | Facility: CLINIC | Age: 58
End: 2023-05-03
Payer: COMMERCIAL

## 2023-05-03 VITALS
RESPIRATION RATE: 16 BRPM | DIASTOLIC BLOOD PRESSURE: 96 MMHG | SYSTOLIC BLOOD PRESSURE: 144 MMHG | OXYGEN SATURATION: 97 % | HEART RATE: 58 BPM

## 2023-05-03 DIAGNOSIS — G70.00 MYASTHENIA GRAVIS WITHOUT EXACERBATION (H): Primary | ICD-10-CM

## 2023-05-03 PROCEDURE — 99214 OFFICE O/P EST MOD 30 MIN: CPT | Performed by: PSYCHIATRY & NEUROLOGY

## 2023-05-03 ASSESSMENT — PAIN SCALES - GENERAL: PAINLEVEL: NO PAIN (0)

## 2023-05-03 NOTE — PROGRESS NOTES
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 Onset was around 2010. First symptom was diplopia. About 3 years later ptosis started. No dysarthria, dysphagia, SOB, or weakness. In 2015 he was found to have ACHR ab. In 2015 he was diagnosed with MG and started on prednisone. He started prednisone 60 mg and tapered off over about 3 months. He was off immunotherapy in 2016, and remained off until 12/2018. At that end of 2018 he developed diplopia and restarted prednisone 60 mg daily. He tapered over a few months. By 2/20 he was taking prednisone 5 mg daily. This time prednisone was helpful to alleviate ptosis and diplopia. He remained on low dose prednisone (around 5 mg) through most of 2019. He was stable during that time. In April 2020 he then developed worsening ptosis and diplopia of both eyes. In 4/20 he increased prednisone to 40 mg daily. By June 2020 he was back down to 5 mg. In 9/20 he then experienced new weakness in his left hand. Typing was difficult. He also felt that his legs were weaker. There was a clear drop off in his typical level of activity and exercise. No dysarthria or dysphagia. In 10/20 prednisone increased to 40 mg daily. Prednisone tapered to 10mg daily in 01/21, and 5 mg by 3/21. He underwent thymectomy on 3/22/21. Leading into thymectomy he received IVIG (2g/kg over 3 days from 3/15-17/2021). It was helpful but poorly tolerated (headache, nausea, malaise). In late 2021 and early 2022 he made several attempts to reduce prednisone below 5. By June 2022 he was able to wean off prednisone but in 1/23 he relapsed. In March 2023 he started vyvgart (series completed 3/28/23)    Interval history:   I last saw him in clinic on 2/8/23. After that visit he started vyvgart.  The Vyvgart series completed March 28. Over the last couple months he weaned prednisone from 20 mg to off. He stopped it  during the vyvgart infusion series. He thinks that after the 2nd of the 4 infusions he started to improve. His legs and arms were stronger. Diplopia improved but did not resolve. He also found that his facial function and ptosis improved. He now has no chewing difficulty or dysarthria or dysphagia. He plays trombone and has been able to play over the last month. No shortness of breath. He continued mestinon 60 mg BID, which was not helpful.     Prior pertinent laboratory work-up:  : Normal/negative Hba1c, TSH  4/15: ACHR ab binding 3.1 (N<0.4)    Prior electrophysiologic work-up:  12/10: RNS of the right facial nerves showed no abnormal decreament or increment.Single fiber EMG of the right frontalis muscle was subjectively well within the normal range (normal <1.69).  All individual pairs demonstrated normal jitter ranging from 13 ms to 47.2 ms with normal <53.5 ms in the frontalis.  The mean jitter of the 20 pairs in the right frontalis muscle was 22.9 ms (nl< 35.5ms). There was no transmission blocking. There is no electrophysiological evidence for neuromuscular junction disorder.  10/14/20: NCS/RNS showed unequivocal decrement in facial-nasalis and ulnar-ADM.     Prior imagin/9/17 CT C/A/P showed no mediastinal mass  10/29/20: CT chest showed unchanged tiny soft tissue density in the anterior superior mediastinum, presumably residual thymus. No enlargement to suggest Thymoma.    Past Medical History:   Past Medical History:   Diagnosis Date     Asthma      Kidney stone      Myasthenia gravis (H)      Strabismus      Thyroid disease      Past Surgical History:  Past Surgical History:   Procedure Laterality Date     COLONOSCOPY N/A 2023    Procedure: COLONOSCOPY;  Surgeon: Trevor Perez MD;  Location: [facility] OR     HERNIA REPAIR       LASER HOLMIUM LITHOTRIPSY URETER(S), INSERT STENT, COMBINED Right 10/15/2014    Procedure: COMBINED CYSTOSCOPY, URETEROSCOPY, LASER HOLMIUM LITHOTRIPSY URETER(S),  INSERT STENT;  Surgeon: Thong Bates MD;  Location: [facility] OR     RECESSION RESECTION WITH ADJUSTABLE SUTURE  9/4/2012    LLRc 5.0mm on adj. LIRc 4.5mm on adj.     RECESSION RESECTION WITH ADJUSTABLE SUTURE BILATERAL  12/18/2012    RSRc 3.0mm; adj. suture     STRABISMUS SURGERY       THORACOSCOPIC THYMECTOMY N/A 3/22/2021    Procedure: SUBXIPHOID BILATERAL THORACOSCOPIC THYMECTOMY;  Surgeon: Benjy Hull MD;  Location: [facility] OR     Family history:    There is no known family history of myopathy or other neuromuscular disorders. He does have a sister with spasmodic dysphonia.     Social History:    He denies tobacco, alcohol, or illicit drug use.     Medical Allergies:    Allergies   Allergen Reactions     Nkda [No Known Drug Allergy]      Current Medications:    Current Outpatient Medications   Medication     albuterol (PROAIR HFA/PROVENTIL HFA/VENTOLIN HFA) 108 (90 Base) MCG/ACT inhaler     apraclonidine (IOPIDINE) 0.5 % ophthalmic solution     levothyroxine (SYNTHROID/LEVOTHROID) 137 MCG tablet     predniSONE (DELTASONE) 10 MG tablet     pyridostigmine (MESTINON) 60 MG tablet     Vitamin D, Cholecalciferol, 25 MCG (1000 UT) TABS     bisacodyl (DULCOLAX) 5 MG EC tablet     levothyroxine (SYNTHROID/LEVOTHROID) 150 MCG tablet     polyethylene glycol (GOLYTELY) 236 g suspension     predniSONE (DELTASONE) 1 MG tablet     predniSONE (DELTASONE) 5 MG tablet     predniSONE (DELTASONE) 5 MG tablet     pyridostigmine (MESTINON) 60 MG tablet     No current facility-administered medications for this visit.     Review of Systems: A review of systems was obtained and was negative except for what was noted above.    Physical examination:    BP (!) 144/96   Pulse 58   Resp 16   SpO2 97%     General Appearance: NAD     Skin: There are no rashes or other skin lesions.     Neurologic examination:     Mental status:  Patient is alert, attentive, and oriented x 3.  Language is coherent and fluent without aphasia.   Memory, comprehension and ability to follow commands were intact.        Cranial nerves:  Severe left ptosis at baseline. Eye closure weak.  Ptosis worsens with brief upgaze. Diplopia is present immediately with lateral gaze. Symmetric smile that is probably full. No dysarthria.      Motor exam: No atrophy or fasciculations. Manual muscle testing revealed the following MRC grade muscle power:    Right Left   Neck flexion 5 5   Neck extension: 5 5   Shoulder ext rotation 5 5   Shoulder abduction:        5 5   Elbow extension: 5 5   Elbow flexion:            5 5   Wrist flexion:            5 5   Wrist extension:        5 5   Finger flexion 5 5   Finger extension 5 5   FDI 5 5   Hip flexion 5 5   Knee flexion 5 5   Knee extension 5 5   Dorsiflexion 5 5   Plantar flexion 5 5   Red indicates worse when compared to last examination  Green indicates improved when compared to last examination     MG Outcomes MG-ADL MG-Composite  strength (kg) MG medications   10/12/20 7 9 Right 35, Left 31 Pred 5 mg daily   11/18/20 5 6 Right 40, Left 39 Pred 25 mg daily   1/20/21 4 2 Right 41, Left 41 Prednisone 10mg daily   3/22/21 xxx xxx xxx Thymectomy   3/31/21   5 9 Right 44, Left 46 IVIG prior to thymectomy 2g/kg over 3 days (03/15-17/2021),  Prednisone 5mg daily   7/14/21 3 4 Right 43, Left 44 Pred 5 mg   3/14/22 3 Not tested (video) Not tested (video) Pred 5 mg   6/22 xxx xxx xxx Weaned off prednisone   2/8/23 7 8 Right 32 kg, left 36 Kg None (pred 20 mg started just 2 days ago)   5/3/23 5 6 Right 32 kg, left 35 kg Vyvgart series (4 weekly infusions) completed 3/28/23     Assessment:    Leonidas Pratt is a 58 year old man with ACHR ab positive generalized non-thymomatous myasthenia gravis now 2 years out from thymectomy (3/22/21). Unfortunately he relapsed in the winter 2023, which prompted us to escalate his immunotherapy to vyvgart. Since completing the first series of infusions 3/28/23 he has made some improvements (2 points  on MG-ADL, 2 points MG composite and improved MRC), but I think we can do better. Compared to vyvgart his MGFA classification is IIb, whereas before it was IIIb. Our goal is minimal manifestations or better. In the vyvgart clinical trial patients were retreated if/when they deteriorated OR if they improved but MG-ADL score was still greater than 5 after 50 days. Considering that, we will proceed with another vyvgart infusion series. Complement inhibition would be our next move if vyvgart is not successful or reasonably durable.     Plan:      1. Immunotherapy:   - Prednisone: Hold off on additional prednisone for now. If symptoms do not improve with the next vyvgart series will add prednisone back and also likely proceed with ultomiris  - Vyvgart 10 mg/kg weekly x 4 completed 3/28/23. Considering improved MG-ADL but ongoing disability will repeat infusion cycle ASAP  2. Thymectomy: Completed 3/22/21. Benefits from thymectomy may take 1-3 years to be fully appreciated. If disease continues to worsen or is not controlled with #1 will repeat CT chest to look for residual thymus tissue  3. Supportive care:  - Mestinon: Continue 60 mg BID to TID  4. Potential MG triggers including heat, surgery, and illness. Certain medications and over the counter preparations may also cause worsening of MG symptoms. A list of cautionary medications can be found at: https://[email or web address].org/What-is-MG/MG-Management/Cautionary-Drugs  5. Follow up 2-3 months. Sooner if needed.    -----    5/8/23: Restarted prednisone 20 mg daily  6/2/23: Receiving vyvgart #4. He is definitely feeling better, but still ptosis and leg weakness persists. Current pred dose 5 mg daily. Advised to stay at 5 mg pred for now. Will try to minimize variables over next infusion cycle. If he is stable at next vuvygart then will try to get off pred.

## 2023-05-03 NOTE — LETTER
5/3/2023       RE: Leonidas Pratt  [address]  [place] [address]       Dear Colleague,    Thank you for referring your patient, Leonidas Pratt, to the [facility] [place] [facility]. Please see a copy of my visit note below.    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 Onset was around 2010. First symptom was diplopia. About 3 years later ptosis started. No dysarthria, dysphagia, SOB, or weakness. In 2015 he was found to have ACHR ab. In 2015 he was diagnosed with MG and started on prednisone. He started prednisone 60 mg and tapered off over about 3 months. He was off immunotherapy in 2016, and remained off until 12/2018. At that end of 2018 he developed diplopia and restarted prednisone 60 mg daily. He tapered over a few months. By 2/20 he was taking prednisone 5 mg daily. This time prednisone was helpful to alleviate ptosis and diplopia. He remained on low dose prednisone (around 5 mg) through most of 2019. He was stable during that time. In April 2020 he then developed worsening ptosis and diplopia of both eyes. In 4/20 he increased prednisone to 40 mg daily. By June 2020 he was back down to 5 mg. In 9/20 he then experienced new weakness in his left hand. Typing was difficult. He also felt that his legs were weaker. There was a clear drop off in his typical level of activity and exercise. No dysarthria or dysphagia. In 10/20 prednisone increased to 40 mg daily. Prednisone tapered to 10mg daily in 01/21, and 5 mg by 3/21. He underwent thymectomy on 3/22/21. Leading into thymectomy he received IVIG (2g/kg over 3 days from 3/15-17/2021). It was helpful but poorly tolerated (headache, nausea, malaise). In late 2021 and early 2022 he made several attempts to reduce prednisone below 5. By June 2022 he was able to  wean off prednisone but in  he relapsed. In 2023 he started vyvgart (series completed 3/28/23)    Interval history:   I last saw him in clinic on 23. After that visit he started vyvgart.  The Vyvgart series completed . Over the last couple months he weaned prednisone from 20 mg to off. He stopped it during the vyvgart infusion series. He thinks that after the 2nd of the 4 infusions he started to improve. His legs and arms were stronger. Diplopia improved but did not resolve. He also found that his facial function and ptosis improved. He now has no chewing difficulty or dysarthria or dysphagia. He plays trombone and has been able to play over the last month. No shortness of breath. He continued mestinon 60 mg BID, which was not helpful.     Prior pertinent laboratory work-up:  : Normal/negative Hba1c, TSH  4/15: ACHR ab binding 3.1 (N<0.4)    Prior electrophysiologic work-up:  12/10: RNS of the right facial nerves showed no abnormal decreament or increment.Single fiber EMG of the right frontalis muscle was subjectively well within the normal range (normal <1.69).  All individual pairs demonstrated normal jitter ranging from 13 ms to 47.2 ms with normal <53.5 ms in the frontalis.  The mean jitter of the 20 pairs in the right frontalis muscle was 22.9 ms (nl< 35.5ms). There was no transmission blocking. There is no electrophysiological evidence for neuromuscular junction disorder.  10/14/20: NCS/RNS showed unequivocal decrement in facial-nasalis and ulnar-ADM.     Prior imagin/9/17 CT C/A/P showed no mediastinal mass  10/29/20: CT chest showed unchanged tiny soft tissue density in the anterior superior mediastinum, presumably residual thymus. No enlargement to suggest Thymoma.    Past Medical History:   Past Medical History:   Diagnosis Date    Asthma     Kidney stone     Myasthenia gravis (H)     Strabismus     Thyroid disease      Past Surgical History:  Past Surgical History:    Procedure Laterality Date    COLONOSCOPY N/A 1/13/2023    Procedure: COLONOSCOPY;  Surgeon: Trevor Perez MD;  Location: [facility] OR    HERNIA REPAIR      LASER HOLMIUM LITHOTRIPSY URETER(S), INSERT STENT, COMBINED Right 10/15/2014    Procedure: COMBINED CYSTOSCOPY, URETEROSCOPY, LASER HOLMIUM LITHOTRIPSY URETER(S), INSERT STENT;  Surgeon: Thong Bates MD;  Location: [facility] OR    RECESSION RESECTION WITH ADJUSTABLE SUTURE  9/4/2012    LLRc 5.0mm on adj. LIRc 4.5mm on adj.    RECESSION RESECTION WITH ADJUSTABLE SUTURE BILATERAL  12/18/2012    RSRc 3.0mm; adj. suture    STRABISMUS SURGERY      THORACOSCOPIC THYMECTOMY N/A 3/22/2021    Procedure: SUBXIPHOID BILATERAL THORACOSCOPIC THYMECTOMY;  Surgeon: Benjy Hull MD;  Location: [facility] OR     Family history:    There is no known family history of myopathy or other neuromuscular disorders. He does have a sister with spasmodic dysphonia.     Social History:    He denies tobacco, alcohol, or illicit drug use.     Medical Allergies:    Allergies   Allergen Reactions    Nkda [No Known Drug Allergy]      Current Medications:    Current Outpatient Medications   Medication    albuterol (PROAIR HFA/PROVENTIL HFA/VENTOLIN HFA) 108 (90 Base) MCG/ACT inhaler    apraclonidine (IOPIDINE) 0.5 % ophthalmic solution    levothyroxine (SYNTHROID/LEVOTHROID) 137 MCG tablet    predniSONE (DELTASONE) 10 MG tablet    pyridostigmine (MESTINON) 60 MG tablet    Vitamin D, Cholecalciferol, 25 MCG (1000 UT) TABS    bisacodyl (DULCOLAX) 5 MG EC tablet    levothyroxine (SYNTHROID/LEVOTHROID) 150 MCG tablet    polyethylene glycol (GOLYTELY) 236 g suspension    predniSONE (DELTASONE) 1 MG tablet    predniSONE (DELTASONE) 5 MG tablet    predniSONE (DELTASONE) 5 MG tablet    pyridostigmine (MESTINON) 60 MG tablet     No current facility-administered medications for this visit.     Review of Systems: A review of systems was obtained and was negative except for what was noted  above.    Physical examination:    BP (!) 144/96   Pulse 58   Resp 16   SpO2 97%     General Appearance: NAD     Skin: There are no rashes or other skin lesions.     Neurologic examination:     Mental status:  Patient is alert, attentive, and oriented x 3.  Language is coherent and fluent without aphasia.  Memory, comprehension and ability to follow commands were intact.        Cranial nerves:  Severe left ptosis at baseline. Eye closure weak.  Ptosis worsens with brief upgaze. Diplopia is present immediately with lateral gaze. Symmetric smile that is probably full. No dysarthria.      Motor exam: No atrophy or fasciculations. Manual muscle testing revealed the following MRC grade muscle power:    Right Left   Neck flexion 5 5   Neck extension: 5 5   Shoulder ext rotation 5 5   Shoulder abduction:        5 5   Elbow extension: 5 5   Elbow flexion:            5 5   Wrist flexion:            5 5   Wrist extension:        5 5   Finger flexion 5 5   Finger extension 5 5   FDI 5 5   Hip flexion 5 5   Knee flexion 5 5   Knee extension 5 5   Dorsiflexion 5 5   Plantar flexion 5 5   Red indicates worse when compared to last examination  Green indicates improved when compared to last examination     MG Outcomes MG-ADL MG-Composite  strength (kg) MG medications   10/12/20 7 9 Right 35, Left 31 Pred 5 mg daily   11/18/20 5 6 Right 40, Left 39 Pred 25 mg daily   1/20/21 4 2 Right 41, Left 41 Prednisone 10mg daily   3/22/21 xxx xxx xxx Thymectomy   3/31/21   5 9 Right 44, Left 46 IVIG prior to thymectomy 2g/kg over 3 days (03/15-17/2021),  Prednisone 5mg daily   7/14/21 3 4 Right 43, Left 44 Pred 5 mg   3/14/22 3 Not tested (video) Not tested (video) Pred 5 mg   6/22 xxx xxx xxx Weaned off prednisone   2/8/23 7 8 Right 32 kg, left 36 Kg None (pred 20 mg started just 2 days ago)   5/3/23 5 6 Right 32 kg, left 35 kg Vyvgart series (4 weekly infusions) completed 3/28/23     Assessment:    Leonidas Pratt is a 58 year old man  with ACHR ab positive generalized non-thymomatous myasthenia gravis now 2 years out from thymectomy (3/22/21). Unfortunately he relapsed in the winter 2023, which prompted us to escalate his immunotherapy to vyvgart. Since completing the first series of infusions 3/28/23 he has made some improvements (2 points on MG-ADL, 2 points MG composite and improved MRC), but I think we can do better. Compared to vyvgart his MGFA classification is IIb, whereas before it was IIIb. Our goal is minimal manifestations or better. In the vyvgart clinical trial patients were retreated if/when they deteriorated OR if they improved but MG-ADL score was still greater than 5 after 50 days. Considering that, we will proceed with another vyvgart infusion series. Complement inhibition would be our next move if vyvgart is not successful or reasonably durable.     Plan:      1. Immunotherapy:   - Prednisone: Hold off on additional prednisone for now. If symptoms do not improve with the next vyvgart series will add prednisone back and also likely proceed with ultomiris  - Vyvgart 10 mg/kg weekly x 4 completed 3/28/23. Considering improved MG-ADL but ongoing disability will repeat infusion cycle ASAP  2. Thymectomy: Completed 3/22/21. Benefits from thymectomy may take 1-3 years to be fully appreciated. If disease continues to worsen or is not controlled with #1 will repeat CT chest to look for residual thymus tissue  3. Supportive care:  - Mestinon: Continue 60 mg BID to TID  4. Potential MG triggers including heat, surgery, and illness. Certain medications and over the counter preparations may also cause worsening of MG symptoms. A list of cautionary medications can be found at: [email or web address].org/What-is-MG/MG-Management/Cautionary-Drugs  5. Follow up 2-3 months. Sooner if needed.    -----          Again, thank you for allowing me to participate in the care of your patient.      Sincerely,    Rj Jackson MD

## 2023-05-11 ENCOUNTER — INFUSION THERAPY VISIT (OUTPATIENT)
Dept: INFUSION THERAPY | Facility: CLINIC | Age: 58
End: 2023-05-11
Attending: PSYCHIATRY & NEUROLOGY
Payer: COMMERCIAL

## 2023-05-11 VITALS — DIASTOLIC BLOOD PRESSURE: 74 MMHG | RESPIRATION RATE: 16 BRPM | HEART RATE: 69 BPM | SYSTOLIC BLOOD PRESSURE: 107 MMHG

## 2023-05-11 DIAGNOSIS — G70.01 MYASTHENIA GRAVIS WITH EXACERBATION (H): Primary | ICD-10-CM

## 2023-05-11 PROCEDURE — 96413 CHEMO IV INFUSION 1 HR: CPT

## 2023-05-11 PROCEDURE — 250N000011 HC RX IP 250 OP 636: Performed by: PSYCHIATRY & NEUROLOGY

## 2023-05-11 PROCEDURE — 258N000003 HC RX IP 258 OP 636: Performed by: PSYCHIATRY & NEUROLOGY

## 2023-05-11 RX ORDER — HEPARIN SODIUM (PORCINE) LOCK FLUSH IV SOLN 100 UNIT/ML 100 UNIT/ML
5 SOLUTION INTRAVENOUS
Status: CANCELLED | OUTPATIENT
Start: 2023-05-18

## 2023-05-11 RX ORDER — ALBUTEROL SULFATE 0.83 MG/ML
2.5 SOLUTION RESPIRATORY (INHALATION)
Status: CANCELLED | OUTPATIENT
Start: 2023-05-18

## 2023-05-11 RX ORDER — ALBUTEROL SULFATE 90 UG/1
1-2 AEROSOL, METERED RESPIRATORY (INHALATION)
Status: CANCELLED
Start: 2023-05-18

## 2023-05-11 RX ORDER — METHYLPREDNISOLONE SODIUM SUCCINATE 125 MG/2ML
125 INJECTION, POWDER, LYOPHILIZED, FOR SOLUTION INTRAMUSCULAR; INTRAVENOUS
Status: CANCELLED
Start: 2023-05-18

## 2023-05-11 RX ORDER — EPINEPHRINE 1 MG/ML
0.3 INJECTION, SOLUTION INTRAMUSCULAR; SUBCUTANEOUS EVERY 5 MIN PRN
Status: CANCELLED | OUTPATIENT
Start: 2023-05-18

## 2023-05-11 RX ORDER — DIPHENHYDRAMINE HYDROCHLORIDE 50 MG/ML
50 INJECTION INTRAMUSCULAR; INTRAVENOUS
Status: CANCELLED
Start: 2023-05-18

## 2023-05-11 RX ORDER — HEPARIN SODIUM,PORCINE 10 UNIT/ML
5 VIAL (ML) INTRAVENOUS
Status: CANCELLED | OUTPATIENT
Start: 2023-05-18

## 2023-05-11 RX ORDER — MEPERIDINE HYDROCHLORIDE 25 MG/ML
25 INJECTION INTRAMUSCULAR; INTRAVENOUS; SUBCUTANEOUS EVERY 30 MIN PRN
Status: CANCELLED | OUTPATIENT
Start: 2023-05-18

## 2023-05-11 RX ADMIN — EFGARTIGIMOD ALFA 800 MG: 20 INJECTION INTRAVENOUS at 07:44

## 2023-05-11 NOTE — PROGRESS NOTES
Infusion Nursing Note:  Leonidas Pratt presents today for Vyvgart infusion.    Patient seen by provider today: No   present during visit today: Not Applicable.    Note: Infusion given over 1 hour with a 1 hour observation following injection.  Pt is scheduled for 3 additional injections weekly.      Intravenous Access:  Peripheral IV placed.    Treatment Conditions:  Biological Infusion Checklist:  ~~~ NOTE: If the patient answers yes to any of the questions below, hold the infusion and contact ordering provider or on-call provider.    1. Have you recently had an elevated temperature, fever, chills, productive cough, coughing for 3 weeks or longer or hemoptysis, abnormal vital signs, night sweats,  chest pain or have you noticed a decrease in your appetite, unexplained weight loss or fatigue? No  2. Do you have any open wounds or new incisions? No  3. Do you have any recent or upcoming hospitalizations, surgeries or dental procedures? No  4. Do you currently have or recently have had any signs of illness or infection or are you on any antibiotics? No  5. Have you had any new, sudden or worsening abdominal pain? No  6. Have you or anyone in your household received a live vaccination in the past 4 weeks? Please note:  No live vaccines while on biologic/chemotherapy until 6 months after the last treatment.  Patient can receive the flu vaccine (shot only) and the pneumovax.  It is optimal for the patient to get these vaccines mid cycle, but they can be given at any time as long as it is not on the day of the infusion. No  7. Have you recently been diagnosed with any new nervous system diseases (ie. Multiple sclerosis, Guillain [place], seizures, neurological changes) or cancer diagnosis? No  8. Are you on any form of radiation or chemotherapy? No  9. Are you pregnant or breast feeding or do you have plans of pregnancy in the future?n/a  10. Have you been having any signs of worsening depression or suicidal  ideations?  (benlysta only) n/a  11. Have there been any other new onset medical symptoms? No        Post Infusion Assessment:  Patient tolerated infusion without incident.       Discharge Plan:   Patient and/or family verbalized understanding of discharge instructions and all questions answered.      Kasandra Dawn RN    Administrations This Visit     efgartigimod terry-fcab (VYVGART) 800 mg in sodium chloride 0.9 % 125 mL infusion     Admin Date  05/11/2023 Action  $New Bag Dose  800 mg Rate  125 mL/hr Route  Intravenous Administered By  Kasandra Dawn RN

## 2023-05-19 ENCOUNTER — INFUSION THERAPY VISIT (OUTPATIENT)
Dept: INFUSION THERAPY | Facility: CLINIC | Age: 58
End: 2023-05-19
Attending: PSYCHIATRY & NEUROLOGY
Payer: COMMERCIAL

## 2023-05-19 VITALS
SYSTOLIC BLOOD PRESSURE: 132 MMHG | TEMPERATURE: 97.5 F | HEART RATE: 68 BPM | WEIGHT: 172.7 LBS | BODY MASS INDEX: 26.65 KG/M2 | RESPIRATION RATE: 16 BRPM | OXYGEN SATURATION: 98 % | DIASTOLIC BLOOD PRESSURE: 78 MMHG

## 2023-05-19 DIAGNOSIS — G70.01 MYASTHENIA GRAVIS WITH EXACERBATION (H): Primary | ICD-10-CM

## 2023-05-19 PROCEDURE — 250N000011 HC RX IP 250 OP 636: Performed by: PSYCHIATRY & NEUROLOGY

## 2023-05-19 PROCEDURE — 96365 THER/PROPH/DIAG IV INF INIT: CPT

## 2023-05-19 PROCEDURE — 96413 CHEMO IV INFUSION 1 HR: CPT

## 2023-05-19 PROCEDURE — 258N000003 HC RX IP 258 OP 636: Performed by: PSYCHIATRY & NEUROLOGY

## 2023-05-19 RX ORDER — EPINEPHRINE 1 MG/ML
0.3 INJECTION, SOLUTION INTRAMUSCULAR; SUBCUTANEOUS EVERY 5 MIN PRN
Status: CANCELLED | OUTPATIENT
Start: 2023-05-25

## 2023-05-19 RX ORDER — ALBUTEROL SULFATE 90 UG/1
1-2 AEROSOL, METERED RESPIRATORY (INHALATION)
Status: CANCELLED
Start: 2023-05-25

## 2023-05-19 RX ORDER — HEPARIN SODIUM (PORCINE) LOCK FLUSH IV SOLN 100 UNIT/ML 100 UNIT/ML
5 SOLUTION INTRAVENOUS
Status: CANCELLED | OUTPATIENT
Start: 2023-05-25

## 2023-05-19 RX ORDER — METHYLPREDNISOLONE SODIUM SUCCINATE 125 MG/2ML
125 INJECTION, POWDER, LYOPHILIZED, FOR SOLUTION INTRAMUSCULAR; INTRAVENOUS
Status: CANCELLED
Start: 2023-05-25

## 2023-05-19 RX ORDER — ALBUTEROL SULFATE 0.83 MG/ML
2.5 SOLUTION RESPIRATORY (INHALATION)
Status: CANCELLED | OUTPATIENT
Start: 2023-05-25

## 2023-05-19 RX ORDER — DIPHENHYDRAMINE HYDROCHLORIDE 50 MG/ML
50 INJECTION INTRAMUSCULAR; INTRAVENOUS
Status: CANCELLED
Start: 2023-05-25

## 2023-05-19 RX ORDER — MEPERIDINE HYDROCHLORIDE 25 MG/ML
25 INJECTION INTRAMUSCULAR; INTRAVENOUS; SUBCUTANEOUS EVERY 30 MIN PRN
Status: CANCELLED | OUTPATIENT
Start: 2023-05-25

## 2023-05-19 RX ORDER — HEPARIN SODIUM,PORCINE 10 UNIT/ML
5 VIAL (ML) INTRAVENOUS
Status: CANCELLED | OUTPATIENT
Start: 2023-05-25

## 2023-05-19 RX ADMIN — EFGARTIGIMOD ALFA 800 MG: 20 INJECTION INTRAVENOUS at 07:56

## 2023-05-19 NOTE — PROGRESS NOTES
Nursing Note  Leonidas Pratt presents today to [facility] for:   Chief Complaint   Patient presents with     Infusion     Vygart     During today's [facility] appointment, orders from Dr. Jackson were completed.  Frequency: weekly    Progress note:  Patient identification verified by name and date of birth.  Assessment completed.  Vitals recorded in Doc Flowsheets.  Patient was provided with education regarding medication/procedure and possible side effects.  Patient verbalized understanding.     present during visit today: Not Applicable.    Treatment Conditions: ~~~ NOTE: If the patient answers yes to any of the questions below, hold the infusion and contact ordering provider or on-call provider.    1. Have you recently had an elevated temperature, fever, chills, productive cough, coughing for 3 weeks or longer or hemoptysis, abnormal vital signs, night sweats,  chest pain or have you noticed a decrease in your appetite, unexplained weight loss or fatigue? No  2. Do you have any open wounds or new incisions? No  3. Do you have any recent or upcoming hospitalizations, surgeries or dental procedures? No  4. Do you currently have or recently have had any signs of illness or infection or are you on any antibiotics? No  5. Have you had any new, sudden or worsening abdominal pain? No  6. Have you or anyone in your household received a live vaccination in the past 4 weeks? Please note:  No live vaccines while on biologic/chemotherapy until 6 months after the last treatment.  Patient can receive the flu vaccine (shot only) and the pneumovax.  It is optimal for the patient to get these vaccines mid cycle, but they can be given at any time as long as it is not on the day of the infusion. No  7. Have you recently been diagnosed with any new nervous system diseases (ie. Multiple sclerosis, Guillain [place], seizures, neurological changes) or cancer diagnosis?  No  8. Are you on any form of radiation or chemotherapy? No  9. Are you pregnant or breast feeding or do you have plans of pregnancy in the future? No  10. Have you been having any signs of worsening depression or suicidal ideations?  (benlysta only) No  11. Have there been any other new onset medical symptoms? No      Premedications: were not ordered.    Drug Waste Record: No    Infusion length and rate:  infusion given over approximately 60 minutes + 1 hour observation    Labs: were not ordered for this appointment.    Vascular access: peripheral IV placed today.    Is the next appt scheduled? yes    Post Infusion Assessment:  Patient tolerated infusion without incident.     Discharge Plan:   Follow up plan of care with: ongoing infusions at [facility]. and ordering provider as scheduled.  Discharge instructions were reviewed with patient.  Patient/representative verbalized understanding of discharge instructions and all questions answered.  Patient discharged from [facility] in stable condition.    Anyi Caldwell RN       Administrations This Visit     efgartigimod terry-fcab (VYVGART) 800 mg in sodium chloride 0.9 % 125 mL infusion     Admin Date  05/19/2023 Action  $New Bag Dose  800 mg Rate  125 mL/hr Route  Intravenous Administered By  Anyi Caldwell, THIAGO                /77 (BP Location: Left arm, Patient Position: Sitting, Cuff Size: Adult Regular)   Pulse 73   Temp 97.5  F (36.4  C) (Oral)   Resp 16   Wt 78.3 kg (172 lb 11.2 oz)   SpO2 98%   BMI 26.65 kg/m

## 2023-05-24 NOTE — TELEPHONE ENCOUNTER
RECORDS RECEIVED FROM: internal    DATE RECEIVED: 8.8.23    NOTES (FOR ALL VISITS) STATUS DETAILS   OFFICE NOTES from referring provider internal   Dr Marion     MEDICATION LIST internal     IMAGING      DEXASCAN  internal  11.29.22      LABS     DIABETES: HBGA1C, CREATININE, FASTING LIPIDS, MICROALBUMIN URINE, POTASSIUM, TSH, T4    THYROID: TSH, T4, CBC, THYRODLONULIN, TOTAL T3, FREE T4, CALCITONIN, CEA internal  TSH/T4- 3.21.23  CMP- 10.28.22  Cbc- 10.28.22  Lipid- 10.28.22

## 2023-05-26 ENCOUNTER — INFUSION THERAPY VISIT (OUTPATIENT)
Dept: INFUSION THERAPY | Facility: CLINIC | Age: 58
End: 2023-05-26
Attending: PSYCHIATRY & NEUROLOGY
Payer: COMMERCIAL

## 2023-05-26 VITALS
DIASTOLIC BLOOD PRESSURE: 88 MMHG | OXYGEN SATURATION: 96 % | WEIGHT: 173.4 LBS | RESPIRATION RATE: 16 BRPM | TEMPERATURE: 98 F | BODY MASS INDEX: 26.76 KG/M2 | HEART RATE: 57 BPM | SYSTOLIC BLOOD PRESSURE: 137 MMHG

## 2023-05-26 DIAGNOSIS — G70.01 MYASTHENIA GRAVIS WITH EXACERBATION (H): Primary | ICD-10-CM

## 2023-05-26 PROCEDURE — 250N000011 HC RX IP 250 OP 636: Performed by: PSYCHIATRY & NEUROLOGY

## 2023-05-26 PROCEDURE — 258N000003 HC RX IP 258 OP 636: Performed by: PSYCHIATRY & NEUROLOGY

## 2023-05-26 PROCEDURE — 96365 THER/PROPH/DIAG IV INF INIT: CPT

## 2023-05-26 PROCEDURE — 96413 CHEMO IV INFUSION 1 HR: CPT

## 2023-05-26 RX ORDER — EPINEPHRINE 1 MG/ML
0.3 INJECTION, SOLUTION INTRAMUSCULAR; SUBCUTANEOUS EVERY 5 MIN PRN
Status: CANCELLED | OUTPATIENT
Start: 2023-06-02

## 2023-05-26 RX ORDER — HEPARIN SODIUM (PORCINE) LOCK FLUSH IV SOLN 100 UNIT/ML 100 UNIT/ML
5 SOLUTION INTRAVENOUS
Status: CANCELLED | OUTPATIENT
Start: 2023-06-02

## 2023-05-26 RX ORDER — ALBUTEROL SULFATE 90 UG/1
1-2 AEROSOL, METERED RESPIRATORY (INHALATION)
Status: CANCELLED
Start: 2023-06-02

## 2023-05-26 RX ORDER — DIPHENHYDRAMINE HYDROCHLORIDE 50 MG/ML
50 INJECTION INTRAMUSCULAR; INTRAVENOUS
Status: CANCELLED
Start: 2023-06-02

## 2023-05-26 RX ORDER — ALBUTEROL SULFATE 0.83 MG/ML
2.5 SOLUTION RESPIRATORY (INHALATION)
Status: CANCELLED | OUTPATIENT
Start: 2023-06-02

## 2023-05-26 RX ORDER — METHYLPREDNISOLONE SODIUM SUCCINATE 125 MG/2ML
125 INJECTION, POWDER, LYOPHILIZED, FOR SOLUTION INTRAMUSCULAR; INTRAVENOUS
Status: CANCELLED
Start: 2023-06-02

## 2023-05-26 RX ORDER — HEPARIN SODIUM,PORCINE 10 UNIT/ML
5 VIAL (ML) INTRAVENOUS
Status: CANCELLED | OUTPATIENT
Start: 2023-06-02

## 2023-05-26 RX ORDER — MEPERIDINE HYDROCHLORIDE 25 MG/ML
25 INJECTION INTRAMUSCULAR; INTRAVENOUS; SUBCUTANEOUS EVERY 30 MIN PRN
Status: CANCELLED | OUTPATIENT
Start: 2023-06-02

## 2023-05-26 RX ADMIN — EFGARTIGIMOD ALFA 800 MG: 20 INJECTION INTRAVENOUS at 08:10

## 2023-05-26 NOTE — PROGRESS NOTES
Chief Complaint   Patient presents with     Infusion     IV Vyvgart     Infusion Nursing Note:  Leonidas Pratt presents today for IV Vyvgart, weekly.  Dose 3/4  Patient seen by provider today: No   present during visit today: Not Applicable.    Note:Vyvgart infused over 1 hour + 1 hour obs.     Intravenous Access:  Peripheral IV placed.  No labs due.     Treatment Conditions:  Biological Infusion Checklist:  ~~~ NOTE: If the patient answers yes to any of the questions below, hold the infusion and contact ordering provider or on-call provider.    1. Have you recently had an elevated temperature, fever, chills, productive cough, coughing for 3 weeks or longer or hemoptysis, abnormal vital signs, night sweats,  chest pain or have you noticed a decrease in your appetite, unexplained weight loss or fatigue? No  2. Do you have any open wounds or new incisions? No  3. Do you have any recent or upcoming hospitalizations, surgeries or dental procedures? No  4. Do you currently have or recently have had any signs of illness or infection or are you on any antibiotics? No  5. Have you had any new, sudden or worsening abdominal pain? No  6. Have you or anyone in your household received a live vaccination in the past 4 weeks? Please note:  No live vaccines while on biologic/chemotherapy until 6 months after the last treatment.  Patient can receive the flu vaccine (shot only) and the pneumovax.  It is optimal for the patient to get these vaccines mid cycle, but they can be given at any time as long as it is not on the day of the infusion. No  7. Have you recently been diagnosed with any new nervous system diseases (ie. Multiple sclerosis, Guillain [place], seizures, neurological changes) or cancer diagnosis? No  8. Are you on any form of radiation or chemotherapy? No  9. Are you pregnant or breast feeding or do you have plans of pregnancy in the future? No  10. Have you been having any signs of worsening depression or  "suicidal ideations?  (benlysta only) No  11. Have there been any other new onset medical symptoms? No    Post Infusion Assessment:  Patient tolerated infusion without incident.  Patient observed for 60 minutes post Vyvgart per protocol.  Blood return noted pre and post infusion.  Site patent and intact, free from redness, edema or discomfort.  No evidence of extravasations.  Access discontinued per protocol.       Discharge Plan:   AVS to patient via MYCHART.  Patient will return 6/2 for next appointment.   Patient discharged in stable condition accompanied by: self.  Departure Mode: Ambulatory.    Administrations This Visit     efgartigimod terry-fcab (VYVGART) 800 mg in sodium chloride 0.9 % 125 mL infusion     Admin Date  05/26/2023 Action  $New Bag Dose  800 mg Rate  125 mL/hr Route  Intravenous Administered By  Sesar Monique RN              Vital signs:  Temp: 98  F (36.7  C) Temp src: Oral BP: 126/82 Pulse: 57   Resp: 18 SpO2: 96 % O2 Device: None (Room air)     Weight: 78.7 kg (173 lb 6.4 oz)  Estimated body mass index is 26.76 kg/m  as calculated from the following:    Height as of 2/10/23: 1.715 m (5' 7.5\").    Weight as of this encounter: 78.7 kg (173 lb 6.4 oz).          "

## 2023-06-02 ENCOUNTER — INFUSION THERAPY VISIT (OUTPATIENT)
Dept: INFUSION THERAPY | Facility: CLINIC | Age: 58
End: 2023-06-02
Attending: PSYCHIATRY & NEUROLOGY
Payer: COMMERCIAL

## 2023-06-02 VITALS
BODY MASS INDEX: 26.54 KG/M2 | SYSTOLIC BLOOD PRESSURE: 118 MMHG | TEMPERATURE: 98.1 F | WEIGHT: 172 LBS | DIASTOLIC BLOOD PRESSURE: 81 MMHG | OXYGEN SATURATION: 99 % | RESPIRATION RATE: 16 BRPM | HEART RATE: 67 BPM

## 2023-06-02 DIAGNOSIS — G70.01 MYASTHENIA GRAVIS WITH EXACERBATION (H): Primary | ICD-10-CM

## 2023-06-02 PROCEDURE — 96413 CHEMO IV INFUSION 1 HR: CPT

## 2023-06-02 PROCEDURE — 258N000003 HC RX IP 258 OP 636: Performed by: PSYCHIATRY & NEUROLOGY

## 2023-06-02 PROCEDURE — 250N000011 HC RX IP 250 OP 636: Performed by: PSYCHIATRY & NEUROLOGY

## 2023-06-02 RX ORDER — HEPARIN SODIUM (PORCINE) LOCK FLUSH IV SOLN 100 UNIT/ML 100 UNIT/ML
5 SOLUTION INTRAVENOUS
Status: DISCONTINUED | OUTPATIENT
Start: 2023-06-02 | End: 2023-06-02 | Stop reason: HOSPADM

## 2023-06-02 RX ORDER — HEPARIN SODIUM (PORCINE) LOCK FLUSH IV SOLN 100 UNIT/ML 100 UNIT/ML
5 SOLUTION INTRAVENOUS
Status: CANCELLED | OUTPATIENT
Start: 2023-06-02

## 2023-06-02 RX ORDER — PREDNISONE 5 MG/1
5 TABLET ORAL SEE ADMIN INSTRUCTIONS
Qty: 60 TABLET | Refills: 3 | Status: SHIPPED | OUTPATIENT
Start: 2023-06-02

## 2023-06-02 RX ORDER — METHYLPREDNISOLONE SODIUM SUCCINATE 125 MG/2ML
125 INJECTION, POWDER, LYOPHILIZED, FOR SOLUTION INTRAMUSCULAR; INTRAVENOUS
Status: CANCELLED
Start: 2023-06-02

## 2023-06-02 RX ORDER — MEPERIDINE HYDROCHLORIDE 25 MG/ML
25 INJECTION INTRAMUSCULAR; INTRAVENOUS; SUBCUTANEOUS EVERY 30 MIN PRN
Status: CANCELLED | OUTPATIENT
Start: 2023-06-02

## 2023-06-02 RX ORDER — HEPARIN SODIUM,PORCINE 10 UNIT/ML
5 VIAL (ML) INTRAVENOUS
Status: DISCONTINUED | OUTPATIENT
Start: 2023-06-02 | End: 2023-06-02 | Stop reason: HOSPADM

## 2023-06-02 RX ORDER — HEPARIN SODIUM,PORCINE 10 UNIT/ML
5 VIAL (ML) INTRAVENOUS
Status: CANCELLED | OUTPATIENT
Start: 2023-06-02

## 2023-06-02 RX ORDER — ALBUTEROL SULFATE 90 UG/1
1-2 AEROSOL, METERED RESPIRATORY (INHALATION)
Status: CANCELLED
Start: 2023-06-02

## 2023-06-02 RX ORDER — EPINEPHRINE 1 MG/ML
0.3 INJECTION, SOLUTION INTRAMUSCULAR; SUBCUTANEOUS EVERY 5 MIN PRN
Status: CANCELLED | OUTPATIENT
Start: 2023-06-02

## 2023-06-02 RX ORDER — ALBUTEROL SULFATE 0.83 MG/ML
2.5 SOLUTION RESPIRATORY (INHALATION)
Status: CANCELLED | OUTPATIENT
Start: 2023-06-02

## 2023-06-02 RX ORDER — DIPHENHYDRAMINE HYDROCHLORIDE 50 MG/ML
50 INJECTION INTRAMUSCULAR; INTRAVENOUS
Status: CANCELLED
Start: 2023-06-02

## 2023-06-02 RX ADMIN — EFGARTIGIMOD ALFA 800 MG: 20 INJECTION INTRAVENOUS at 08:01

## 2023-06-02 RX ADMIN — SODIUM CHLORIDE 20 ML: 9 INJECTION, SOLUTION INTRAVENOUS at 08:59

## 2023-06-02 ASSESSMENT — PAIN SCALES - GENERAL: PAINLEVEL: NO PAIN (0)

## 2023-06-02 NOTE — PATIENT INSTRUCTIONS
POST-INFUSION OF BIOLOGICAL MEDICATION:     Reviewed with patient.  Given biologic medication or medication hand-out. Inform patient if any fever, chills or signs of infection, new symptoms, abdominal pain, heart palpitations, shortness of breath, reaction, weakness, neurological changes, seek medical attention immediately and should not receive infusions. No live virus vaccines prior to or during treatment or up to 6 months post infusion. If the patient has an upcoming procedure or surgery, this should be discussed with the rheumatologist and surgeon or provider. EDUCATION POST BIOLOGICAL/CHEMOTHERAPY INFUSION  Call the triage nurse at your clinic or seek medical attention if you have chills and/or temperature greater than or equal to 100.5, uncontrolled nausea/vomiting, diarrhea, constipation, dizziness, shortness of breath, chest pain, heart palpitations, weakness or any other new or concerning symptoms, questions or concerns.  You can not have any live virus vaccines prior to or during treatment or up to 6 months post infusion.  If you have an upcoming surgery, medical procedure or dental procedure during treatment, this should be discussed with your ordering physician and your surgeon/dentist.  If you are having any concerning symptom, if you are unsure if you should get your next infusion or wish to speak to a provider before your next infusion, please call your care coordinator or triage nurse at your clinic to notify them so we can adequately serve you.

## 2023-06-02 NOTE — PROGRESS NOTES
Infusion Nursing Note:  Leonidas Pratt presents today for Vygart .    Patient seen by provider today: No   present during visit today: Not Applicable.    today's infusion was last in sequence. Patient to follow up with his MD.       Intravenous Access:  No Intravenous access/labs at this visit.    Treatment Conditions:  Biological Infusion Checklist:  ~~~ NOTE: If the patient answers yes to any of the questions below, hold the infusion and contact ordering provider or on-call provider.    1. Have you recently had an elevated temperature, fever, chills, productive cough, coughing for 3 weeks or longer or hemoptysis,  abnormal vital signs, night sweats,  chest pain or have you noticed a decrease in your appetite, unexplained weight loss or fatigue? No  2. Do you have any open wounds or new incisions? No  3. Do you have any upcoming hospitalizations or surgeries? Does not include esophagogastroduodenoscopy, colonoscopy, endoscopic retrograde cholangiopancreatography (ERCP), endoscopic ultrasound (EUS), dental procedures or joint aspiration/steroid injections No  4. Do you currently have any signs of illness or infection or are you on any antibiotics? No  5. Have you had any new, sudden or worsening abdominal pain? No  6. Have you or anyone in your household received a live vaccination in the past 4 weeks? Please note: No live vaccines while on biologic/chemotherapy until 6 months after the last treatment. Patient can receive the flu vaccine (shot only), pneumovax and the Covid vaccine. It is optimal for the patient to get these vaccines mid cycle, but they can be given at any time as long as it is not on the day of the infusion. No  7. Have you recently been diagnosed with any new nervous system diseases (ie. Multiple sclerosis, Guillain [place], seizures, neurological changes) or cancer diagnosis? Are you on any form of radiation or chemotherapy? No  8. Are you pregnant or breast feeding or do you have  plans of pregnancy in the future? No    9. Have there been any other new onset medical symptoms? No          Post Infusion Assessment:  Patient tolerated infusion without incident.  Patient observed for 60 minutes post infusion per protocol.  Site patent and intact, free from redness, edema or discomfort.  Access discontinued per protocol.     Administrations This Visit     0.9% sodium chloride BOLUS     Admin Date  06/02/2023 Action  $New Bag Dose  20 mL Route  Intravenous Administered By  Jenae Moscoso RN          efgartigimod terry-fcab (VYVGART) 800 mg in sodium chloride 0.9 % 125 mL infusion     Admin Date  06/02/2023 Action  $New Bag Dose  800 mg Rate  125 mL/hr Route  Intravenous Administered By  Jenae Moscoso RN                Discharge Plan:   AVS to patient via MYCHART.       Jenae Moscoso RN

## 2023-06-05 ENCOUNTER — TELEPHONE (OUTPATIENT)
Dept: NEUROLOGY | Facility: CLINIC | Age: 58
End: 2023-06-05

## 2023-06-05 RX ORDER — ALBUTEROL SULFATE 90 UG/1
1-2 AEROSOL, METERED RESPIRATORY (INHALATION)
Status: CANCELLED
Start: 2023-06-05

## 2023-06-05 RX ORDER — EPINEPHRINE 1 MG/ML
0.3 INJECTION, SOLUTION, CONCENTRATE INTRAVENOUS EVERY 5 MIN PRN
Status: CANCELLED | OUTPATIENT
Start: 2023-06-05

## 2023-06-05 RX ORDER — METHYLPREDNISOLONE SODIUM SUCCINATE 125 MG/2ML
125 INJECTION, POWDER, LYOPHILIZED, FOR SOLUTION INTRAMUSCULAR; INTRAVENOUS
Status: CANCELLED
Start: 2023-06-05

## 2023-06-05 RX ORDER — HEPARIN SODIUM,PORCINE 10 UNIT/ML
5 VIAL (ML) INTRAVENOUS
Status: CANCELLED | OUTPATIENT
Start: 2023-06-05

## 2023-06-05 RX ORDER — DIPHENHYDRAMINE HYDROCHLORIDE 50 MG/ML
50 INJECTION INTRAMUSCULAR; INTRAVENOUS
Status: CANCELLED
Start: 2023-06-05

## 2023-06-05 RX ORDER — ALBUTEROL SULFATE 0.83 MG/ML
2.5 SOLUTION RESPIRATORY (INHALATION)
Status: CANCELLED | OUTPATIENT
Start: 2023-06-05

## 2023-06-05 RX ORDER — HEPARIN SODIUM (PORCINE) LOCK FLUSH IV SOLN 100 UNIT/ML 100 UNIT/ML
5 SOLUTION INTRAVENOUS
Status: CANCELLED | OUTPATIENT
Start: 2023-06-05

## 2023-06-05 RX ORDER — MEPERIDINE HYDROCHLORIDE 25 MG/ML
25 INJECTION INTRAMUSCULAR; INTRAVENOUS; SUBCUTANEOUS EVERY 30 MIN PRN
Status: CANCELLED | OUTPATIENT
Start: 2023-06-05

## 2023-06-05 NOTE — TELEPHONE ENCOUNTER
Leonidas to begin his third cycle of Vyvgart 55 days after previous cycle began.  Target start date of next cycle: 7/5.  Vyvgart therapy plan routed to Dr. Jackson for signature.  Once signed, Leonidas will be notified to schedule infusions at [facility].    Lynda Smith RN

## 2023-07-07 ENCOUNTER — INFUSION THERAPY VISIT (OUTPATIENT)
Dept: INFUSION THERAPY | Facility: CLINIC | Age: 58
End: 2023-07-07
Attending: PSYCHIATRY & NEUROLOGY
Payer: COMMERCIAL

## 2023-07-07 VITALS
HEART RATE: 66 BPM | SYSTOLIC BLOOD PRESSURE: 130 MMHG | RESPIRATION RATE: 16 BRPM | WEIGHT: 174.1 LBS | DIASTOLIC BLOOD PRESSURE: 86 MMHG | TEMPERATURE: 97.6 F | OXYGEN SATURATION: 99 % | BODY MASS INDEX: 26.87 KG/M2

## 2023-07-07 DIAGNOSIS — G70.01 MYASTHENIA GRAVIS WITH EXACERBATION (H): Primary | ICD-10-CM

## 2023-07-07 PROCEDURE — 96413 CHEMO IV INFUSION 1 HR: CPT

## 2023-07-07 PROCEDURE — 250N000011 HC RX IP 250 OP 636: Mod: JZ | Performed by: PSYCHIATRY & NEUROLOGY

## 2023-07-07 PROCEDURE — 999N000248 HC STATISTIC IV INSERT WITH US BY RN

## 2023-07-07 PROCEDURE — 258N000003 HC RX IP 258 OP 636: Performed by: PSYCHIATRY & NEUROLOGY

## 2023-07-07 RX ORDER — DIPHENHYDRAMINE HYDROCHLORIDE 50 MG/ML
50 INJECTION INTRAMUSCULAR; INTRAVENOUS
Status: CANCELLED
Start: 2023-07-14

## 2023-07-07 RX ORDER — ALBUTEROL SULFATE 0.83 MG/ML
2.5 SOLUTION RESPIRATORY (INHALATION)
Status: CANCELLED | OUTPATIENT
Start: 2023-07-14

## 2023-07-07 RX ORDER — HEPARIN SODIUM,PORCINE 10 UNIT/ML
5 VIAL (ML) INTRAVENOUS
Status: CANCELLED | OUTPATIENT
Start: 2023-07-14

## 2023-07-07 RX ORDER — HEPARIN SODIUM (PORCINE) LOCK FLUSH IV SOLN 100 UNIT/ML 100 UNIT/ML
5 SOLUTION INTRAVENOUS
Status: CANCELLED | OUTPATIENT
Start: 2023-07-14

## 2023-07-07 RX ORDER — MEPERIDINE HYDROCHLORIDE 25 MG/ML
25 INJECTION INTRAMUSCULAR; INTRAVENOUS; SUBCUTANEOUS EVERY 30 MIN PRN
Status: CANCELLED | OUTPATIENT
Start: 2023-07-14

## 2023-07-07 RX ORDER — METHYLPREDNISOLONE SODIUM SUCCINATE 125 MG/2ML
125 INJECTION, POWDER, LYOPHILIZED, FOR SOLUTION INTRAMUSCULAR; INTRAVENOUS
Status: CANCELLED
Start: 2023-07-14

## 2023-07-07 RX ORDER — EPINEPHRINE 1 MG/ML
0.3 INJECTION, SOLUTION INTRAMUSCULAR; SUBCUTANEOUS EVERY 5 MIN PRN
Status: CANCELLED | OUTPATIENT
Start: 2023-07-14

## 2023-07-07 RX ORDER — ALBUTEROL SULFATE 90 UG/1
1-2 AEROSOL, METERED RESPIRATORY (INHALATION)
Status: CANCELLED
Start: 2023-07-14

## 2023-07-07 RX ADMIN — EFGARTIGIMOD ALFA 800 MG: 20 INJECTION INTRAVENOUS at 15:56

## 2023-07-07 NOTE — PROGRESS NOTES
Infusion Nursing Note:  Leonidas Pratt presents today for Vyvgart.    Patient seen by provider today: No   present during visit today: Not Applicable.    Note: Vyvgart infused over an hour.    Administrations This Visit     efgartigimod terry-fcab (VYVGART) 800 mg in sodium chloride 0.9 % 125 mL infusion     Admin Date  07/07/2023 Action  $New Bag Dose  800 mg Rate  125 mL/hr Route  Intravenous Administered By  Elizabeth Combs, RN              -Patient won't be able to get the full 1 hr observation post infusion due to clinic closing at 5:30 pm today. Paged provider (SAMIR CUMMINGS) and said ok.   -Care transfer to Nurse Sofi at 04:39 pm.    Intravenous Access:  Peripheral IV placed by vascular.    Treatment Conditions:  Biological Infusion Checklist:  ~~~ NOTE: If the patient answers yes to any of the questions below, hold the infusion and contact ordering provider or on-call provider.    1. Have you recently had an elevated temperature, fever, chills, productive cough, coughing for 3 weeks or longer or hemoptysis,  abnormal vital signs, night sweats,  chest pain or have you noticed a decrease in your appetite, unexplained weight loss or fatigue? No  2. Do you have any open wounds or new incisions? No  3. Do you have any upcoming hospitalizations or surgeries? Does not include esophagogastroduodenoscopy, colonoscopy, endoscopic retrograde cholangiopancreatography (ERCP), endoscopic ultrasound (EUS), dental procedures or joint aspiration/steroid injections No  4. Do you currently have any signs of illness or infection or are you on any antibiotics? No  5. Have you had any new, sudden or worsening abdominal pain? No  6. Have you or anyone in your household received a live vaccination in the past 4 weeks? Please note: No live vaccines while on biologic/chemotherapy until 6 months after the last treatment. Patient can receive the flu vaccine (shot only), pneumovax and the Covid vaccine. It is optimal for  the patient to get these vaccines mid cycle, but they can be given at any time as long as it is not on the day of the infusion. No  7. Have you recently been diagnosed with any new nervous system diseases (ie. Multiple sclerosis, Guillain [place], seizures, neurological changes) or cancer diagnosis? Are you on any form of radiation or chemotherapy? No  8. Are you pregnant or breast feeding or do you have plans of pregnancy in the future? No  9. Have you been having any signs of worsening depression or suicidal ideations?  (benlysta only) No  10. Have there been any other new onset medical symptoms? No  11. Have you had any new blood clots? (IVIG only) No        Post Infusion Assessment:  Patient tolerated infusion without incident.  Blood return noted pre and post infusion.  Site patent and intact, free from redness, edema or discomfort.  No evidence of extravasations.  Access discontinued per protocol.  Biologic Infusion Post Education: Call the triage nurse at your clinic or seek medical attention if you have chills and/or temperature greater than or equal to 100.5, uncontrolled nausea/vomiting, diarrhea, constipation, dizziness, shortness of breath, chest pain, heart palpitations, weakness or any other new or concerning symptoms, questions or concerns.  You cannot have any live virus vaccines prior to or during treatment or up to 6 months post infusion.  If you have an upcoming surgery, medical procedure or dental procedure during treatment, this should be discussed with your ordering physician and your surgeon/dentist.  If you are having any concerning symptom, if you are unsure if you should get your next infusion or wish to speak to a provider before your next infusion, please call your care coordinator or triage nurse at your clinic to notify them so we can adequately serve you.       Discharge Plan:   Discharge instructions reviewed with: Patient.  Patient and/or family verbalized understanding of discharge  instructions and all questions answered.  AVS to patient via [employer].  Patient will return 7/14/23 for next appointment.   Patient discharged in stable condition accompanied by: self.  Departure Mode: Ambulatory.    /86 (BP Location: Left arm, Patient Position: Sitting, Cuff Size: Adult Regular)   Pulse 66   Temp 97.6  F (36.4  C) (Oral)   Resp 16   Wt 79 kg (174 lb 1.6 oz)   SpO2 99%   BMI 26.87 kg/m      Elizabeth Combs RN

## 2023-07-07 NOTE — PATIENT INSTRUCTIONS
Dear Leonidas Pratt    Thank you for choosing [facility] ([facility]) for your infusion.  The following information is a summary of our appointment as well as important reminders.      EDUCATION POST BIOLOGICAL/CHEMOTHERAPY INFUSION  Call the triage nurse at your clinic or seek medical attention if you have chills and/or temperature greater than or equal to 100.5, uncontrolled nausea/vomiting, diarrhea, constipation, dizziness, shortness of breath, chest pain, heart palpitations, weakness or any other new or concerning symptoms, questions or concerns.  You can not have any live virus vaccines prior to or during treatment or up to 6 months post infusion.  If you have an upcoming surgery, medical procedure or dental procedure during treatment, this should be discussed with your ordering physician and your surgeon/dentist.  If you are having any concerning symptom, if you are unsure if you should get your next infusion or wish to speak to a provider before your next infusion, please call your care coordinator or triage nurse at your clinic to notify them so we can adequately serve you.     We look forward in seeing you on your next appointment here at [facility] ([facility]).  Please don t hesitate to call us at [phone] to reschedule any of your appointments or to speak with one of the [facility] registered nurses.  It was a pleasure taking care of you today.    Sincerely,    [facility]  [facility]  [address] [place] [address]  [address]  Phone:  [phone]

## 2023-07-14 ENCOUNTER — INFUSION THERAPY VISIT (OUTPATIENT)
Dept: INFUSION THERAPY | Facility: CLINIC | Age: 58
End: 2023-07-14
Attending: PSYCHIATRY & NEUROLOGY
Payer: COMMERCIAL

## 2023-07-14 VITALS
OXYGEN SATURATION: 98 % | HEART RATE: 75 BPM | SYSTOLIC BLOOD PRESSURE: 124 MMHG | DIASTOLIC BLOOD PRESSURE: 80 MMHG | BODY MASS INDEX: 26.82 KG/M2 | TEMPERATURE: 97.9 F | WEIGHT: 173.8 LBS | RESPIRATION RATE: 16 BRPM

## 2023-07-14 DIAGNOSIS — G70.01 MYASTHENIA GRAVIS WITH EXACERBATION (H): Primary | ICD-10-CM

## 2023-07-14 PROCEDURE — 258N000003 HC RX IP 258 OP 636: Performed by: PSYCHIATRY & NEUROLOGY

## 2023-07-14 PROCEDURE — 96413 CHEMO IV INFUSION 1 HR: CPT

## 2023-07-14 PROCEDURE — 250N000011 HC RX IP 250 OP 636: Mod: JZ | Performed by: PSYCHIATRY & NEUROLOGY

## 2023-07-14 RX ORDER — MEPERIDINE HYDROCHLORIDE 25 MG/ML
25 INJECTION INTRAMUSCULAR; INTRAVENOUS; SUBCUTANEOUS EVERY 30 MIN PRN
Status: CANCELLED | OUTPATIENT
Start: 2023-07-21

## 2023-07-14 RX ORDER — ALBUTEROL SULFATE 90 UG/1
1-2 AEROSOL, METERED RESPIRATORY (INHALATION)
Status: CANCELLED
Start: 2023-07-21

## 2023-07-14 RX ORDER — DIPHENHYDRAMINE HYDROCHLORIDE 50 MG/ML
50 INJECTION INTRAMUSCULAR; INTRAVENOUS
Status: CANCELLED
Start: 2023-07-21

## 2023-07-14 RX ORDER — HEPARIN SODIUM,PORCINE 10 UNIT/ML
5 VIAL (ML) INTRAVENOUS
Status: CANCELLED | OUTPATIENT
Start: 2023-07-21

## 2023-07-14 RX ORDER — EPINEPHRINE 1 MG/ML
0.3 INJECTION, SOLUTION INTRAMUSCULAR; SUBCUTANEOUS EVERY 5 MIN PRN
Status: CANCELLED | OUTPATIENT
Start: 2023-07-21

## 2023-07-14 RX ORDER — ALBUTEROL SULFATE 0.83 MG/ML
2.5 SOLUTION RESPIRATORY (INHALATION)
Status: CANCELLED | OUTPATIENT
Start: 2023-07-21

## 2023-07-14 RX ORDER — METHYLPREDNISOLONE SODIUM SUCCINATE 125 MG/2ML
125 INJECTION, POWDER, LYOPHILIZED, FOR SOLUTION INTRAMUSCULAR; INTRAVENOUS
Status: CANCELLED
Start: 2023-07-21

## 2023-07-14 RX ORDER — HEPARIN SODIUM (PORCINE) LOCK FLUSH IV SOLN 100 UNIT/ML 100 UNIT/ML
5 SOLUTION INTRAVENOUS
Status: CANCELLED | OUTPATIENT
Start: 2023-07-21

## 2023-07-14 RX ADMIN — EFGARTIGIMOD ALFA 800 MG: 20 INJECTION INTRAVENOUS at 07:56

## 2023-07-14 ASSESSMENT — PAIN SCALES - GENERAL: PAINLEVEL: NO PAIN (0)

## 2023-07-14 NOTE — PATIENT INSTRUCTIONS
Dear Leonidas Pratt    Thank you for choosing [facility] ([facility]) for your infusion.  The following information is a summary of our appointment as well as important reminders.      EDUCATION POST BIOLOGICAL/CHEMOTHERAPY INFUSION  Call the triage nurse at your clinic or seek medical attention if you have chills and/or temperature greater than or equal to 100.5, uncontrolled nausea/vomiting, diarrhea, constipation, dizziness, shortness of breath, chest pain, heart palpitations, weakness or any other new or concerning symptoms, questions or concerns.  You can not have any live virus vaccines prior to or during treatment or up to 6 months post infusion.  If you have an upcoming surgery, medical procedure or dental procedure during treatment, this should be discussed with your ordering physician and your surgeon/dentist.  If you are having any concerning symptom, if you are unsure if you should get your next infusion or wish to speak to a provider before your next infusion, please call your care coordinator or triage nurse at your clinic to notify them so we can adequately serve you.     We look forward in seeing you on your next appointment here at [facility] ([facility]).  Please don t hesitate to call us at [phone] to reschedule any of your appointments or to speak with one of the [facility] registered nurses.  It was a pleasure taking care of you today.    Sincerely,    [facility]  [facility]  [address] [place] [address]  [id number]  Phone:  [phone]

## 2023-07-14 NOTE — PROGRESS NOTES
Infusion Nursing Note:  Leonidas Pratt presents today for vyvgart.    Patient seen by provider today: No   present during visit today: Not Applicable.    Intravenous Access:  Peripheral IV placed.    Treatment Conditions:  Biological Infusion Checklist:  ~~~ NOTE: If the patient answers yes to any of the questions below, hold the infusion and contact ordering provider or on-call provider.    1. Have you recently had an elevated temperature, fever, chills, productive cough, coughing for 3 weeks or longer or hemoptysis,  abnormal vital signs, night sweats,  chest pain or have you noticed a decrease in your appetite, unexplained weight loss or fatigue? No  2. Do you have any open wounds or new incisions? No  3. Do you have any upcoming hospitalizations or surgeries? Does not include esophagogastroduodenoscopy, colonoscopy, endoscopic retrograde cholangiopancreatography (ERCP), endoscopic ultrasound (EUS), dental procedures or joint aspiration/steroid injections No  4. Do you currently have any signs of illness or infection or are you on any antibiotics? No  5. Have you had any new, sudden or worsening abdominal pain? No  6. Have you or anyone in your household received a live vaccination in the past 4 weeks? Please note: No live vaccines while on biologic/chemotherapy until 6 months after the last treatment. Patient can receive the flu vaccine (shot only), pneumovax and the Covid vaccine. It is optimal for the patient to get these vaccines mid cycle, but they can be given at any time as long as it is not on the day of the infusion. No  7. Have you recently been diagnosed with any new nervous system diseases (ie. Multiple sclerosis, Guillain [place], seizures, neurological changes) or cancer diagnosis? Are you on any form of radiation or chemotherapy? No  8. Are you pregnant or breast feeding or do you have plans of pregnancy in the future? N/A  9. Have you been having any signs of worsening depression or  suicidal ideations?  (benlysta only) N/A  10. Have there been any other new onset medical symptoms? No  11. Have you had any new blood clots? (IVIG only) N/A    Post Infusion Assessment:  Patient tolerated infusion without incident.  Patient observed for 60 minutes post infusion per protocol.  Blood return noted pre and post infusion.  Site patent and intact, free from redness, edema or discomfort.  No evidence of extravasations.  Access discontinued per protocol.     Discharge Plan:   Discharge instructions reviewed with: Patient.  Patient and/or family verbalized understanding of discharge instructions and all questions answered.  AVS to patient via [employer].  Patient will return 7/21/23 for next appointment.   Patient discharged in stable condition accompanied by: self.  Departure Mode: Ambulatory.    Administrations This Visit     efgartigimod terry-fcab (VYVGART) 800 mg in sodium chloride 0.9 % 125 mL infusion     Admin Date  07/14/2023 Action  $New Bag Dose  800 mg Rate  125 mL/hr Route  Intravenous Administered By  Irlanda Posadas, RN                Irlanda Posadas RN

## 2023-07-19 ENCOUNTER — TELEPHONE (OUTPATIENT)
Dept: NEUROLOGY | Facility: CLINIC | Age: 58
End: 2023-07-19

## 2023-07-19 ENCOUNTER — OFFICE VISIT (OUTPATIENT)
Dept: NEUROLOGY | Facility: CLINIC | Age: 58
End: 2023-07-19
Payer: COMMERCIAL

## 2023-07-19 VITALS
WEIGHT: 177.2 LBS | BODY MASS INDEX: 27.34 KG/M2 | DIASTOLIC BLOOD PRESSURE: 87 MMHG | OXYGEN SATURATION: 98 % | SYSTOLIC BLOOD PRESSURE: 137 MMHG | HEART RATE: 77 BPM

## 2023-07-19 DIAGNOSIS — G70.00 MYASTHENIA GRAVIS WITHOUT EXACERBATION (H): ICD-10-CM

## 2023-07-19 DIAGNOSIS — G70.01 MYASTHENIA GRAVIS WITH EXACERBATION (H): Primary | ICD-10-CM

## 2023-07-19 PROCEDURE — 99214 OFFICE O/P EST MOD 30 MIN: CPT | Performed by: PSYCHIATRY & NEUROLOGY

## 2023-07-19 RX ORDER — HEPARIN SODIUM,PORCINE 10 UNIT/ML
5-20 VIAL (ML) INTRAVENOUS DAILY PRN
Status: CANCELLED | OUTPATIENT
Start: 2023-07-19

## 2023-07-19 RX ORDER — HEPARIN SODIUM (PORCINE) LOCK FLUSH IV SOLN 100 UNIT/ML 100 UNIT/ML
5 SOLUTION INTRAVENOUS
Status: CANCELLED | OUTPATIENT
Start: 2023-07-19

## 2023-07-19 RX ORDER — MEPERIDINE HYDROCHLORIDE 25 MG/ML
25 INJECTION INTRAMUSCULAR; INTRAVENOUS; SUBCUTANEOUS EVERY 30 MIN PRN
Status: CANCELLED | OUTPATIENT
Start: 2023-07-19

## 2023-07-19 RX ORDER — ALBUTEROL SULFATE 0.83 MG/ML
2.5 SOLUTION RESPIRATORY (INHALATION)
Status: CANCELLED | OUTPATIENT
Start: 2023-07-19

## 2023-07-19 RX ORDER — ALBUTEROL SULFATE 90 UG/1
1-2 AEROSOL, METERED RESPIRATORY (INHALATION)
Status: CANCELLED
Start: 2023-07-19

## 2023-07-19 RX ORDER — EPINEPHRINE 1 MG/ML
0.3 INJECTION, SOLUTION, CONCENTRATE INTRAVENOUS EVERY 5 MIN PRN
Status: CANCELLED | OUTPATIENT
Start: 2023-07-19

## 2023-07-19 RX ORDER — DIPHENHYDRAMINE HYDROCHLORIDE 50 MG/ML
50 INJECTION INTRAMUSCULAR; INTRAVENOUS
Status: CANCELLED
Start: 2023-07-19

## 2023-07-19 RX ORDER — PYRIDOSTIGMINE BROMIDE 60 MG/1
TABLET ORAL
Qty: 90 TABLET | Refills: 1 | Status: SHIPPED | OUTPATIENT
Start: 2023-07-19 | End: 2023-11-22

## 2023-07-19 RX ORDER — METHYLPREDNISOLONE SODIUM SUCCINATE 125 MG/2ML
125 INJECTION, POWDER, LYOPHILIZED, FOR SOLUTION INTRAMUSCULAR; INTRAVENOUS
Status: CANCELLED
Start: 2023-07-19

## 2023-07-19 ASSESSMENT — PAIN SCALES - GENERAL: PAINLEVEL: NO PAIN (0)

## 2023-07-19 NOTE — TELEPHONE ENCOUNTER
Leonidas to begin Ultomiris. Therapy plan routed to Dr. Jackson for signature.      Lynda Smith RN     Higher Level of Care or Service Not Available/Consultant Request

## 2023-07-19 NOTE — LETTER
7/19/2023       RE: Leonidas Pratt  [address]  [place] [address]     Dear Colleague,    Thank you for referring your patient, Leonidas Pratt, to the [facility] [place] at [facility]. Please see a copy of my visit note below.    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 Onset was around 2010. First symptom was diplopia. About 3 years later ptosis started. No dysarthria, dysphagia, SOB, or weakness. In 2015 he was found to have ACHR ab. In 2015 he was diagnosed with MG and started on prednisone. He started prednisone 60 mg and tapered off over about 3 months. He was off immunotherapy in 2016, and remained off until 12/2018. At that end of 2018 he developed diplopia and restarted prednisone 60 mg daily. He tapered over a few months. By 2/20 he was taking prednisone 5 mg daily. This time prednisone was helpful to alleviate ptosis and diplopia. He remained on low dose prednisone (around 5 mg) through most of 2019. He was stable during that time. In April 2020 he then developed worsening ptosis and diplopia of both eyes. In 4/20 he increased prednisone to 40 mg daily. By June 2020 he was back down to 5 mg. In 9/20 he then experienced new weakness in his left hand. Typing was difficult. He also felt that his legs were weaker. There was a clear drop off in his typical level of activity and exercise. No dysarthria or dysphagia. In 10/20 prednisone increased to 40 mg daily. Prednisone tapered to 10mg daily in 01/21, and 5 mg by 3/21. He underwent thymectomy on 3/22/21. Leading into thymectomy he received IVIG (2g/kg over 3 days from 3/15-17/2021). It was helpful but poorly tolerated (headache, nausea, malaise). In late 2021 and early 2022 he made several attempts to reduce prednisone below 5. By June 2022 he was able to  wean off prednisone but in  he relapsed. In 2023 he started vyvgart (series completed 3/28/23)    Interval history:   I last saw him in clinic on 5/3/23. He is currently in the midist of his 3rd vyvgart infusion cycle, now between thr 2nd and 3rd infusion. He typically notices benefit after about 5 days and the benefits last about 5-6 weeks. When he is at peak improvement after vyvgart he still has diplopia every day but not sustained throughout the day. He still has ptosis, but not severe. He still has weakness in his legs, but is able to walk and climb stairs with extra effort. When he is at the trough diplopia is constant, ptosis is severe and legs feel very wobbly. He also gets hand weakness. Presently he has weaned prednisone down to 5 mg.     Prior pertinent laboratory work-up:  : Normal/negative Hba1c, TSH  4/15: ACHR ab binding 3.1 (N<0.4)    Prior electrophysiologic work-up:  12/10: RNS of the right facial nerves showed no abnormal decreament or increment.Single fiber EMG of the right frontalis muscle was subjectively well within the normal range (normal <1.69).  All individual pairs demonstrated normal jitter ranging from 13 ms to 47.2 ms with normal <53.5 ms in the frontalis.  The mean jitter of the 20 pairs in the right frontalis muscle was 22.9 ms (nl< 35.5ms). There was no transmission blocking. There is no electrophysiological evidence for neuromuscular junction disorder.  10/14/20: NCS/RNS showed unequivocal decrement in facial-nasalis and ulnar-ADM.     Prior imagin/9/17 CT C/A/P showed no mediastinal mass  10/29/20: CT chest showed unchanged tiny soft tissue density in the anterior superior mediastinum, presumably residual thymus. No enlargement to suggest Thymoma.    Past Medical History:   Past Medical History:   Diagnosis Date    Asthma     Kidney stone     Myasthenia gravis (H)     Strabismus     Thyroid disease      Past Surgical History:  Past Surgical History:   Procedure  Laterality Date    COLONOSCOPY N/A 1/13/2023    Procedure: COLONOSCOPY;  Surgeon: Trevor Perez MD;  Location: [facility] OR    HERNIA REPAIR      LASER HOLMIUM LITHOTRIPSY URETER(S), INSERT STENT, COMBINED Right 10/15/2014    Procedure: COMBINED CYSTOSCOPY, URETEROSCOPY, LASER HOLMIUM LITHOTRIPSY URETER(S), INSERT STENT;  Surgeon: Thong Bates MD;  Location: [facility]    RECESSION RESECTION WITH ADJUSTABLE SUTURE  9/4/2012    LLRc 5.0mm on adj. LIRc 4.5mm on adj.    RECESSION RESECTION WITH ADJUSTABLE SUTURE BILATERAL  12/18/2012    RSRc 3.0mm; adj. suture    STRABISMUS SURGERY      THORACOSCOPIC THYMECTOMY N/A 3/22/2021    Procedure: SUBXIPHOID BILATERAL THORACOSCOPIC THYMECTOMY;  Surgeon: Benjy Hull MD;  Location: [facility] OR     Family history:    There is no known family history of myopathy or other neuromuscular disorders. He does have a sister with spasmodic dysphonia.     Social History:    He denies tobacco, alcohol, or illicit drug use.     Medical Allergies:    Allergies   Allergen Reactions    Nkda [No Known Drug Allergy]      Current Medications:    Current Outpatient Medications   Medication    albuterol (PROAIR HFA/PROVENTIL HFA/VENTOLIN HFA) 108 (90 Base) MCG/ACT inhaler    levothyroxine (SYNTHROID/LEVOTHROID) 137 MCG tablet    predniSONE (DELTASONE) 5 MG tablet    pyridostigmine (MESTINON) 60 MG tablet    Vitamin D, Cholecalciferol, 25 MCG (1000 UT) TABS    apraclonidine (IOPIDINE) 0.5 % ophthalmic solution    bisacodyl (DULCOLAX) 5 MG EC tablet    levothyroxine (SYNTHROID/LEVOTHROID) 150 MCG tablet    polyethylene glycol (GOLYTELY) 236 g suspension    predniSONE (DELTASONE) 1 MG tablet    predniSONE (DELTASONE) 10 MG tablet    predniSONE (DELTASONE) 5 MG tablet    predniSONE (DELTASONE) 5 MG tablet    pyridostigmine (MESTINON) 60 MG tablet     No current facility-administered medications for this visit.     Review of Systems: A review of systems was obtained and was negative except  for what was noted above.    Physical examination:    /87 (BP Location: Right arm, Patient Position: Sitting, Cuff Size: Adult Regular)   Pulse 77   Wt 80.4 kg (177 lb 3.2 oz)   SpO2 98%   BMI 27.34 kg/m      General Appearance: NAD     Skin: There are no rashes or other skin lesions.     Neurologic examination:     Mental status:  Patient is alert, attentive, and oriented x 3.  Language is coherent and fluent without aphasia.  Memory, comprehension and ability to follow commands were intact.        Cranial nerves:  Severe left ptosis at baseline. Eye closure weak.  Ptosis worsens with brief upgaze. Diplopia is present immediately with lateral gaze. Symmetric smile that is probably full. No dysarthria.      Motor exam: No atrophy or fasciculations. Manual muscle testing revealed the following MRC grade muscle power:    Right Left   Neck flexion 5 5   Neck extension: 5 5   Shoulder ext rotation 5 5   Shoulder abduction:        5 5   Elbow extension: 5 5   Elbow flexion:            5 5   Wrist flexion:            5 5   Wrist extension:        5 5   Finger flexion 5 5   Finger extension 5 5   FDI 5 5   Hip flexion 5 5   Knee flexion 5 5   Knee extension 5 5   Dorsiflexion 5 5   Plantar flexion 5 5   Red indicates worse when compared to last examination  Green indicates improved when compared to last examination     MG Outcomes MG-ADL MG-Composite  strength (kg) MG medications   10/12/20 7 9 Right 35, Left 31 Pred 5 mg daily   11/18/20 5 6 Right 40, Left 39 Pred 25 mg daily   1/20/21 4 2 Right 41, Left 41 Prednisone 10mg daily   3/22/21 xxx xxx xxx Thymectomy   3/31/21   5 9 Right 44, Left 46 IVIG prior to thymectomy 2g/kg over 3 days (03/15-17/2021),  Prednisone 5mg daily   7/14/21 3 4 Right 43, Left 44 Pred 5 mg   3/14/22 3 Not tested (video) Not tested (video) Pred 5 mg   6/22 xxx xxx xxx Weaned off prednisone   2/8/23 7 8 Right 32 kg, left 36 Kg None (pred 20 mg started just 2 days ago)   5/3/23 5 6  Right 32 kg, left 35 kg Vyvgart series (4 weekly infusions) completed 3/28/23   7/19/23 5 6 Right 36 kg, left 33 kg Vyvgart series (currently in between infusion 2 and 3 on 3rd vyvgart cycle), pred 5 mg daily     Assessment:    Leonidas Pratt is a 58 year old man with ACHR ab positive generalized non-thymomatous myasthenia gravis s/p thymectomy in 3/2021. Current treatment with vyvgart is clearly helpful, but I think we can do better. Even at maximum improvement with vyvgart he still has substantial disability and the therapeutic benefit is not as durable as we would like. We discussed alternative approaches, specifically the risks and potential benefits of complement inhibition. We will proceed to ultomiris as below.       Plan:      1. Immunotherapy:   - Prednisone: Continue prednisone 5 mg daily for now  - Vyvgart 10 mg/kg weekly x 4 started 3/23, now in the middle of third infusion cycles. Will complete this cycle before moving on to ultomiris.   - Ultomiris - will initiate as induction dose of 2700 mg followed by dose of 3300 mg at 2 weeks then q 2 months  2. Thymectomy: Completed 3/22/21. Benefits from thymectomy may take 1-3 years to be fully appreciated. If disease continues to worsen or is not controlled with #1 will repeat CT chest to look for residual thymus tissue  3. Supportive care:  - Mestinon: Continue 60 mg BID to TID  4. Collateral care:   - Needs meningococcal vaccine started at least 2 weeks before ultomiris: Meningococcal conjugate or MenACWY vaccines (Menactra , Menveo , and MenQuadfi ) and   Serogroup B meningococcal or MenB vaccines (Bexsero and Trumenba )  5. Potential MG triggers including heat, surgery, and illness. Certain medications and over the counter preparations may also cause worsening of MG symptoms. A list of cautionary medications can be found at: [email or web address]-[email or web address]-MG/MG-Management/[email or web address]-Drugs  6. Follow up 1-2 months. Sooner if needed.     -----              Again, thank you for allowing me to participate in the care of your patient.      Sincerely,    Rj Jackson MD

## 2023-07-19 NOTE — PROGRESS NOTES
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 Onset was around 2010. First symptom was diplopia. About 3 years later ptosis started. No dysarthria, dysphagia, SOB, or weakness. In 2015 he was found to have ACHR ab. In 2015 he was diagnosed with MG and started on prednisone. He started prednisone 60 mg and tapered off over about 3 months. He was off immunotherapy in 2016, and remained off until 12/2018. At that end of 2018 he developed diplopia and restarted prednisone 60 mg daily. He tapered over a few months. By 2/20 he was taking prednisone 5 mg daily. This time prednisone was helpful to alleviate ptosis and diplopia. He remained on low dose prednisone (around 5 mg) through most of 2019. He was stable during that time. In April 2020 he then developed worsening ptosis and diplopia of both eyes. In 4/20 he increased prednisone to 40 mg daily. By June 2020 he was back down to 5 mg. In 9/20 he then experienced new weakness in his left hand. Typing was difficult. He also felt that his legs were weaker. There was a clear drop off in his typical level of activity and exercise. No dysarthria or dysphagia. In 10/20 prednisone increased to 40 mg daily. Prednisone tapered to 10mg daily in 01/21, and 5 mg by 3/21. He underwent thymectomy on 3/22/21. Leading into thymectomy he received IVIG (2g/kg over 3 days from 3/15-17/2021). It was helpful but poorly tolerated (headache, nausea, malaise). In late 2021 and early 2022 he made several attempts to reduce prednisone below 5. By June 2022 he was able to wean off prednisone but in 1/23 he relapsed. In March 2023 he started vyvgart (series completed 3/28/23)    Interval history:   I last saw him in clinic on 5/3/23. He is currently in the midist of his 3rd vyvgart infusion cycle, now between thr 2nd and 3rd infusion. He typically notices benefit after about 5 days and the  benefits last about 5-6 weeks. When he is at peak improvement after vyvgart he still has diplopia every day but not sustained throughout the day. He still has ptosis, but not severe. He still has weakness in his legs, but is able to walk and climb stairs with extra effort. When he is at the trough diplopia is constant, ptosis is severe and legs feel very wobbly. He also gets hand weakness. Presently he has weaned prednisone down to 5 mg.     Prior pertinent laboratory work-up:  : Normal/negative Hba1c, TSH  4/15: ACHR ab binding 3.1 (N<0.4)    Prior electrophysiologic work-up:  12/10: RNS of the right facial nerves showed no abnormal decreament or increment.Single fiber EMG of the right frontalis muscle was subjectively well within the normal range (normal <1.69).  All individual pairs demonstrated normal jitter ranging from 13 ms to 47.2 ms with normal <53.5 ms in the frontalis.  The mean jitter of the 20 pairs in the right frontalis muscle was 22.9 ms (nl< 35.5ms). There was no transmission blocking. There is no electrophysiological evidence for neuromuscular junction disorder.  10/14/20: NCS/RNS showed unequivocal decrement in facial-nasalis and ulnar-ADM.     Prior imagin/9/17 CT C/A/P showed no mediastinal mass  10/29/20: CT chest showed unchanged tiny soft tissue density in the anterior superior mediastinum, presumably residual thymus. No enlargement to suggest Thymoma.    Past Medical History:   Past Medical History:   Diagnosis Date     Asthma      Kidney stone      Myasthenia gravis (H)      Strabismus      Thyroid disease      Past Surgical History:  Past Surgical History:   Procedure Laterality Date     COLONOSCOPY N/A 2023    Procedure: COLONOSCOPY;  Surgeon: Trevor Perez MD;  Location: [facility] OR     HERNIA REPAIR       LASER HOLMIUM LITHOTRIPSY URETER(S), INSERT STENT, COMBINED Right 10/15/2014    Procedure: COMBINED CYSTOSCOPY, URETEROSCOPY, LASER HOLMIUM LITHOTRIPSY URETER(S),  INSERT STENT;  Surgeon: Thong Bates MD;  Location: [facility] OR     RECESSION RESECTION WITH ADJUSTABLE SUTURE  9/4/2012    LLRc 5.0mm on adj. LIRc 4.5mm on adj.     RECESSION RESECTION WITH ADJUSTABLE SUTURE BILATERAL  12/18/2012    RSRc 3.0mm; adj. suture     STRABISMUS SURGERY       THORACOSCOPIC THYMECTOMY N/A 3/22/2021    Procedure: SUBXIPHOID BILATERAL THORACOSCOPIC THYMECTOMY;  Surgeon: Benjy Hull MD;  Location: [facility] OR     Family history:    There is no known family history of myopathy or other neuromuscular disorders. He does have a sister with spasmodic dysphonia.     Social History:    He denies tobacco, alcohol, or illicit drug use.     Medical Allergies:    Allergies   Allergen Reactions     Nkda [No Known Drug Allergy]      Current Medications:    Current Outpatient Medications   Medication     albuterol (PROAIR HFA/PROVENTIL HFA/VENTOLIN HFA) 108 (90 Base) MCG/ACT inhaler     levothyroxine (SYNTHROID/LEVOTHROID) 137 MCG tablet     predniSONE (DELTASONE) 5 MG tablet     pyridostigmine (MESTINON) 60 MG tablet     Vitamin D, Cholecalciferol, 25 MCG (1000 UT) TABS     apraclonidine (IOPIDINE) 0.5 % ophthalmic solution     bisacodyl (DULCOLAX) 5 MG EC tablet     levothyroxine (SYNTHROID/LEVOTHROID) 150 MCG tablet     polyethylene glycol (GOLYTELY) 236 g suspension     predniSONE (DELTASONE) 1 MG tablet     predniSONE (DELTASONE) 10 MG tablet     predniSONE (DELTASONE) 5 MG tablet     predniSONE (DELTASONE) 5 MG tablet     pyridostigmine (MESTINON) 60 MG tablet     No current facility-administered medications for this visit.     Review of Systems: A review of systems was obtained and was negative except for what was noted above.    Physical examination:    /87 (BP Location: Right arm, Patient Position: Sitting, Cuff Size: Adult Regular)   Pulse 77   Wt 80.4 kg (177 lb 3.2 oz)   SpO2 98%   BMI 27.34 kg/m      General Appearance: NAD     Skin: There are no rashes or other skin  lesions.     Neurologic examination:     Mental status:  Patient is alert, attentive, and oriented x 3.  Language is coherent and fluent without aphasia.  Memory, comprehension and ability to follow commands were intact.        Cranial nerves:  Severe left ptosis at baseline. Eye closure weak.  Ptosis worsens with brief upgaze. Diplopia is present immediately with lateral gaze. Symmetric smile that is probably full. No dysarthria.      Motor exam: No atrophy or fasciculations. Manual muscle testing revealed the following MRC grade muscle power:    Right Left   Neck flexion 5 5   Neck extension: 5 5   Shoulder ext rotation 5 5   Shoulder abduction:        5 5   Elbow extension: 5 5   Elbow flexion:            5 5   Wrist flexion:            5 5   Wrist extension:        5 5   Finger flexion 5 5   Finger extension 5 5   FDI 5 5   Hip flexion 5 5   Knee flexion 5 5   Knee extension 5 5   Dorsiflexion 5 5   Plantar flexion 5 5   Red indicates worse when compared to last examination  Green indicates improved when compared to last examination     MG Outcomes MG-ADL MG-Composite  strength (kg) MG medications   10/12/20 7 9 Right 35, Left 31 Pred 5 mg daily   11/18/20 5 6 Right 40, Left 39 Pred 25 mg daily   1/20/21 4 2 Right 41, Left 41 Prednisone 10mg daily   3/22/21 xxx xxx xxx Thymectomy   3/31/21   5 9 Right 44, Left 46 IVIG prior to thymectomy 2g/kg over 3 days (03/15-17/2021),  Prednisone 5mg daily   7/14/21 3 4 Right 43, Left 44 Pred 5 mg   3/14/22 3 Not tested (video) Not tested (video) Pred 5 mg   6/22 xxx xxx xxx Weaned off prednisone   2/8/23 7 8 Right 32 kg, left 36 Kg None (pred 20 mg started just 2 days ago)   5/3/23 5 6 Right 32 kg, left 35 kg Vyvgart series (4 weekly infusions) completed 3/28/23   7/19/23 5 6 Right 36 kg, left 33 kg Vyvgart series (currently in between infusion 2 and 3 on 3rd vyvgart cycle), pred 5 mg daily     Assessment:    Leonidas Pratt is a 58 year old man with ACHR ab positive  generalized non-thymomatous myasthenia gravis s/p thymectomy in 3/2021. Current treatment with vyvgart is clearly helpful, but I think we can do better. Even at maximum improvement with vyvgart he still has substantial disability and the therapeutic benefit is not as durable as we would like. We discussed alternative approaches, specifically the risks and potential benefits of complement inhibition. We will proceed to ultomiris as below.       Plan:      1. Immunotherapy:   - Prednisone: Continue prednisone 5 mg daily for now  - Vyvgart 10 mg/kg weekly x 4 started 3/23, now in the middle of third infusion cycles. Will complete this cycle before moving on to ultomiris.   - Ultomiris - will initiate as induction dose of 2700 mg followed by dose of 3300 mg at 2 weeks then q 2 months  2. Thymectomy: Completed 3/22/21. Benefits from thymectomy may take 1-3 years to be fully appreciated. If disease continues to worsen or is not controlled with #1 will repeat CT chest to look for residual thymus tissue  3. Supportive care:  - Mestinon: Continue 60 mg BID to TID  4. Collateral care:   - Needs meningococcal vaccine started at least 2 weeks before ultomiris: Meningococcal conjugate or MenACWY vaccines (Menactra , Menveo , and MenQuadfi ) and   Serogroup B meningococcal or MenB vaccines (Bexsero and Trumenba )  5. Potential MG triggers including heat, surgery, and illness. Certain medications and over the counter preparations may also cause worsening of MG symptoms. A list of cautionary medications can be found at: [email or web address]-MG/MG-Management/Cautionary-Drugs  6. Follow up 1-2 months. Sooner if needed.    -----

## 2023-07-20 NOTE — PROGRESS NOTES
PRIOR TO INFUSION OF BIOLOGICAL MEDICATIONS OR ANY OF THESE AS LISTED: vygart  Prior to Infusion of biological medications or any of these as listed:    1. Elevated temperature, fever, chills, productive cough or abnormal vital signs, night sweats, coughing up blood or sputum, no appetite or abnormal vital signs : NO    2. Open wounds or new incisions: NO    3. Recent hospitalization: NO    4.  Recent surgeries:  NO    5. Any upcoming surgeries or dental procedures?:NO    6. Any current or recent bouts of illness or infection? On any antibiotics? : NO    7. Any new, sudden or worsening abdominal pain :NO    8. Vaccination within 4 weeks? Patient or someone in the household is scheduled to receive vaccination? No live virus vaccines prior to or during treatment :NO    9. Any nervous system diseases [i.e. multiple sclerosis, Guillain-Purdum, seizures, neurological  changes]: NO    10. Pregnant or breast feeding; or plans on pregnancy in the future: NO    11. Signs of worsening depression or suicidal ideations while taking benlysta:NO    12. New-onset medical symptoms: YES, patient woke up with sore throat, no fevers, we should call (I asked him to call and update us). Patient is not sure if he can get here by 1330 as he is working up in [place] until noon but possibly    13.  New cancer diagnosis or on chemotherapy or radiation NO    14.  Evaluate for any sign of active TB [Unexplained weight loss, Loss of appetite, Night sweats, Fever, Fatigue, Chills, Coughing for 3 weeks or longer, Hemoptysis (coughing up blood), Chest pain]: NO    **Note: If answered yes to any of the above, hold the infusion and contact ordering rheumatologist or on-call rheumatologist.

## 2023-07-21 ENCOUNTER — INFUSION THERAPY VISIT (OUTPATIENT)
Dept: INFUSION THERAPY | Facility: CLINIC | Age: 58
End: 2023-07-21
Attending: PSYCHIATRY & NEUROLOGY
Payer: COMMERCIAL

## 2023-07-21 VITALS
DIASTOLIC BLOOD PRESSURE: 78 MMHG | HEART RATE: 70 BPM | WEIGHT: 177 LBS | RESPIRATION RATE: 16 BRPM | SYSTOLIC BLOOD PRESSURE: 114 MMHG | BODY MASS INDEX: 27.31 KG/M2 | TEMPERATURE: 98.1 F

## 2023-07-21 DIAGNOSIS — G70.01 MYASTHENIA GRAVIS WITH EXACERBATION (H): Primary | ICD-10-CM

## 2023-07-21 PROCEDURE — 258N000003 HC RX IP 258 OP 636: Performed by: PSYCHIATRY & NEUROLOGY

## 2023-07-21 PROCEDURE — 250N000011 HC RX IP 250 OP 636: Mod: JZ | Performed by: PSYCHIATRY & NEUROLOGY

## 2023-07-21 PROCEDURE — 96413 CHEMO IV INFUSION 1 HR: CPT

## 2023-07-21 RX ORDER — HEPARIN SODIUM,PORCINE 10 UNIT/ML
5 VIAL (ML) INTRAVENOUS
Status: CANCELLED | OUTPATIENT
Start: 2023-07-28

## 2023-07-21 RX ORDER — MEPERIDINE HYDROCHLORIDE 25 MG/ML
25 INJECTION INTRAMUSCULAR; INTRAVENOUS; SUBCUTANEOUS EVERY 30 MIN PRN
Status: CANCELLED | OUTPATIENT
Start: 2023-07-28

## 2023-07-21 RX ORDER — METHYLPREDNISOLONE SODIUM SUCCINATE 125 MG/2ML
125 INJECTION, POWDER, LYOPHILIZED, FOR SOLUTION INTRAMUSCULAR; INTRAVENOUS
Status: CANCELLED
Start: 2023-07-28

## 2023-07-21 RX ORDER — ALBUTEROL SULFATE 0.83 MG/ML
2.5 SOLUTION RESPIRATORY (INHALATION)
Status: CANCELLED | OUTPATIENT
Start: 2023-07-28

## 2023-07-21 RX ORDER — DIPHENHYDRAMINE HYDROCHLORIDE 50 MG/ML
50 INJECTION INTRAMUSCULAR; INTRAVENOUS
Status: CANCELLED
Start: 2023-07-28

## 2023-07-21 RX ORDER — HEPARIN SODIUM (PORCINE) LOCK FLUSH IV SOLN 100 UNIT/ML 100 UNIT/ML
5 SOLUTION INTRAVENOUS
Status: CANCELLED | OUTPATIENT
Start: 2023-07-28

## 2023-07-21 RX ORDER — EPINEPHRINE 1 MG/ML
0.3 INJECTION, SOLUTION INTRAMUSCULAR; SUBCUTANEOUS EVERY 5 MIN PRN
Status: CANCELLED | OUTPATIENT
Start: 2023-07-28

## 2023-07-21 RX ORDER — ALBUTEROL SULFATE 90 UG/1
1-2 AEROSOL, METERED RESPIRATORY (INHALATION)
Status: CANCELLED
Start: 2023-07-28

## 2023-07-21 RX ADMIN — EFGARTIGIMOD ALFA 800 MG: 20 INJECTION INTRAVENOUS at 13:49

## 2023-07-21 ASSESSMENT — PAIN SCALES - GENERAL: PAINLEVEL: NO PAIN (0)

## 2023-07-21 NOTE — PROGRESS NOTES
Infusion Nursing Note:  Leonidas Pratt presents today for a weekly vyvgart infusion this is 3 of 4..    Patient seen by provider today: No   present during visit today: Not Applicable.    Note: See biologic checklist from Allison DAS.  Per pt, sore throat resolved.  Possible post nasal drip.      Intravenous Access:  Peripheral IV placed.        Post Infusion Assessment:  Patient tolerated infusion without incident.       Discharge Plan:   Patient and/or family verbalized understanding of discharge instructions and all questions answered.      Kasandra Dawn RN      Administrations This Visit     efgartigimod terry-fcab (VYVGART) 800 mg in sodium chloride 0.9 % 125 mL infusion     Admin Date  07/21/2023 Action  $New Bag Dose  800 mg Rate  125 mL/hr Route  Intravenous Administered By  Kasandra Dawn, RN

## 2023-07-28 ENCOUNTER — INFUSION THERAPY VISIT (OUTPATIENT)
Dept: INFUSION THERAPY | Facility: CLINIC | Age: 58
End: 2023-07-28
Attending: PSYCHIATRY & NEUROLOGY
Payer: COMMERCIAL

## 2023-07-28 VITALS
HEART RATE: 62 BPM | WEIGHT: 173.1 LBS | TEMPERATURE: 97.5 F | DIASTOLIC BLOOD PRESSURE: 77 MMHG | RESPIRATION RATE: 16 BRPM | OXYGEN SATURATION: 95 % | SYSTOLIC BLOOD PRESSURE: 129 MMHG | BODY MASS INDEX: 26.71 KG/M2

## 2023-07-28 DIAGNOSIS — G70.01 MYASTHENIA GRAVIS WITH EXACERBATION (H): Primary | ICD-10-CM

## 2023-07-28 PROCEDURE — 250N000011 HC RX IP 250 OP 636: Mod: JZ | Performed by: PSYCHIATRY & NEUROLOGY

## 2023-07-28 PROCEDURE — 258N000003 HC RX IP 258 OP 636: Performed by: PSYCHIATRY & NEUROLOGY

## 2023-07-28 PROCEDURE — 96413 CHEMO IV INFUSION 1 HR: CPT

## 2023-07-28 RX ORDER — EPINEPHRINE 1 MG/ML
0.3 INJECTION, SOLUTION INTRAMUSCULAR; SUBCUTANEOUS EVERY 5 MIN PRN
Status: CANCELLED | OUTPATIENT
Start: 2023-07-28

## 2023-07-28 RX ORDER — DIPHENHYDRAMINE HYDROCHLORIDE 50 MG/ML
50 INJECTION INTRAMUSCULAR; INTRAVENOUS
Status: CANCELLED
Start: 2023-07-28

## 2023-07-28 RX ORDER — ALBUTEROL SULFATE 90 UG/1
1-2 AEROSOL, METERED RESPIRATORY (INHALATION)
Status: CANCELLED
Start: 2023-07-28

## 2023-07-28 RX ORDER — HEPARIN SODIUM (PORCINE) LOCK FLUSH IV SOLN 100 UNIT/ML 100 UNIT/ML
5 SOLUTION INTRAVENOUS
Status: CANCELLED | OUTPATIENT
Start: 2023-07-28

## 2023-07-28 RX ORDER — METHYLPREDNISOLONE SODIUM SUCCINATE 125 MG/2ML
125 INJECTION, POWDER, LYOPHILIZED, FOR SOLUTION INTRAMUSCULAR; INTRAVENOUS
Status: CANCELLED
Start: 2023-07-28

## 2023-07-28 RX ORDER — ALBUTEROL SULFATE 0.83 MG/ML
2.5 SOLUTION RESPIRATORY (INHALATION)
Status: CANCELLED | OUTPATIENT
Start: 2023-07-28

## 2023-07-28 RX ORDER — MEPERIDINE HYDROCHLORIDE 25 MG/ML
25 INJECTION INTRAMUSCULAR; INTRAVENOUS; SUBCUTANEOUS EVERY 30 MIN PRN
Status: CANCELLED | OUTPATIENT
Start: 2023-07-28

## 2023-07-28 RX ORDER — HEPARIN SODIUM,PORCINE 10 UNIT/ML
5 VIAL (ML) INTRAVENOUS
Status: CANCELLED | OUTPATIENT
Start: 2023-07-28

## 2023-07-28 RX ADMIN — EFGARTIGIMOD ALFA 800 MG: 20 INJECTION INTRAVENOUS at 07:57

## 2023-07-28 ASSESSMENT — PAIN SCALES - GENERAL: PAINLEVEL: NO PAIN (0)

## 2023-07-28 NOTE — PATIENT INSTRUCTIONS
Dear Leonidas Pratt    Thank you for choosing [facility] ([facility]) for your infusion.  The following information is a summary of our appointment as well as important reminders.      We look forward in seeing you on your next appointment here at [facility] ([facility]).  Please don t hesitate to call us at [phone] to reschedule any of your appointments or to speak with one of the [facility] registered nurses.  It was a pleasure taking care of you today.    Sincerely,    [facility]  [facility]  [address] [place] [address]  [address]  Phone:  [phone]

## 2023-07-28 NOTE — PROGRESS NOTES
Infusion Nursing Note:  Leonidas Pratt presents today for vyvgart.    Patient seen by provider today: No   present during visit today: Not Applicable.    Note: dose 4/4.    Intravenous Access:  Peripheral IV placed.    Treatment Conditions:  Biological Infusion Checklist:  ~~~ NOTE: If the patient answers yes to any of the questions below, hold the infusion and contact ordering provider or on-call provider.    Have you recently had an elevated temperature, fever, chills, productive cough, coughing for 3 weeks or longer or hemoptysis,  abnormal vital signs, night sweats,  chest pain or have you noticed a decrease in your appetite, unexplained weight loss or fatigue? No  Do you have any open wounds or new incisions? No  Do you have any upcoming hospitalizations or surgeries? Does not include esophagogastroduodenoscopy, colonoscopy, endoscopic retrograde cholangiopancreatography (ERCP), endoscopic ultrasound (EUS), dental procedures or joint aspiration/steroid injections No  Do you currently have any signs of illness or infection or are you on any antibiotics? No  Have you had any new, sudden or worsening abdominal pain? No  Have you or anyone in your household received a live vaccination in the past 4 weeks? Please note: No live vaccines while on biologic/chemotherapy until 6 months after the last treatment. Patient can receive the flu vaccine (shot only), pneumovax and the Covid vaccine. It is optimal for the patient to get these vaccines mid cycle, but they can be given at any time as long as it is not on the day of the infusion. No  Have you recently been diagnosed with any new nervous system diseases (ie. Multiple sclerosis, Guillain Mulberry, seizures, neurological changes) or cancer diagnosis? Are you on any form of radiation or chemotherapy? No  Are you pregnant or breast feeding or do you have plans of pregnancy in the future? N/A  Have you been having any signs of worsening depression or suicidal  ideations?  (benlysta only) N/A  Have there been any other new onset medical symptoms? No  Have you had any new blood clots? (IVIG only) N/A      Post Infusion Assessment:  Patient tolerated infusion without incident.  Patient observed for 60 minutes post infusion per protocol.  Blood return noted pre and post infusion.  Site patent and intact, free from redness, edema or discomfort.  No evidence of extravasations.  Access discontinued per protocol.     POST-INFUSION OF BIOLOGICAL MEDICATION:     Reviewed with patient.  Given biologic medication or medication hand-out. Inform patient if any fever, chills or signs of infection, new symptoms, abdominal pain, heart palpitations, shortness of breath, reaction, weakness, neurological changes, seek medical attention immediately and should not receive infusions. No live virus vaccines prior to or during treatment or up to 6 months post infusion. If the patient has an upcoming procedure or surgery, this should be discussed with the rheumatologist and surgeon or provider.    Discharge Plan:   Discharge instructions reviewed with: Patient.  Patient and/or family verbalized understanding of discharge instructions and all questions answered.  [facility]    Patient discharged in stable condition accompanied by: self.  Departure Mode: Ambulatory.    Administrations This Visit       efgartigimod terry-fcab (VYVGART) 800 mg in sodium chloride 0.9 % 125 mL infusion       Admin Date  07/28/2023 Action  $New Bag Dose  800 mg Rate  125 mL/hr Route  Intravenous Administered By  Irlanda Posadas, RN                    Irlanda Posadas, THIAGO

## 2023-08-08 ENCOUNTER — VIRTUAL VISIT (OUTPATIENT)
Dept: ENDOCRINOLOGY | Facility: CLINIC | Age: 58
End: 2023-08-08
Attending: FAMILY MEDICINE
Payer: COMMERCIAL

## 2023-08-08 ENCOUNTER — PRE VISIT (OUTPATIENT)
Dept: ENDOCRINOLOGY | Facility: CLINIC | Age: 58
End: 2023-08-08

## 2023-08-08 DIAGNOSIS — M85.80 OSTEOPENIA, UNSPECIFIED LOCATION: ICD-10-CM

## 2023-08-08 DIAGNOSIS — E03.9 HYPOTHYROIDISM, UNSPECIFIED TYPE: ICD-10-CM

## 2023-08-08 PROCEDURE — 99215 OFFICE O/P EST HI 40 MIN: CPT | Mod: VID | Performed by: STUDENT IN AN ORGANIZED HEALTH CARE EDUCATION/TRAINING PROGRAM

## 2023-08-08 ASSESSMENT — PAIN SCALES - GENERAL: PAINLEVEL: NO PAIN (0)

## 2023-08-08 NOTE — NURSING NOTE
Is the patient currently in the state of [place]? YES    Visit mode:VIDEO    If the visit is dropped, the patient can be reconnected by: VIDEO VISIT: Text to cell phone: [phone]    Will anyone else be joining the visit? NO      How would you like to obtain your AVS? MyChart    Are changes needed to the allergy or medication list? NO    Reason for visit: No chief complaint on file.

## 2023-08-08 NOTE — PROGRESS NOTES
Endocrinology Clinic Visit 8/8/2023      Video-Visit Details    Type of service:  Video Visit  Joined the call at 8/8/2023, 1:41:07 pm.  Left the call at 8/8/2023, 2:36:27 pm.    Originating Location (pt. Location): Home        Distant Location (provider location):  Off-site    Mode of Communication:  Video Conference via [employer]    Physician has received verbal consent for a Video Visit from the patient? Yes    I spent a total of 60 minutes on the date of encounter reviewing medical records, evaluating the patient, coordinating care and documenting in the EHR, as detailed above.      NAME:  Leonidas Pratt  PCP:  Moreno Marion  MRN:  [id number]  Reason for Consult:  osteopenia   Requesting Provider:  Moreno Marion    Chief Complaint     Chief Complaint   Patient presents with    Consult       History of Present Illness     Leonidas Pratt is a 58 year old male who is seen in video visit for osteopenia     He had MS for 15 years, difficult to manage. He reported being on 20 mg prednisone twice a year for 2 month. He said when he was a kid he was also on prednisone for asthma for 3 month per year.   He is currently on prednisone 5 mg , tapering down over the past 3 month.     The patient had a DXA scan on 11/2022 which showed: lowest T score of -2.4 at the right femoral head  Calcium intake:   Dietary: he reported getting 1200 mg of calcium per day  Supplements: 1 mark per day.    Vitamin D intake:   Supplements: 1000 international unit(s) daily  Last vitamin D level was 31 in 2017, PTH was 91 and had one time calcium was 7.8    Osteoporosis Risk factors:   Previous fractures: no  Family history of fragility fracture in parent: father with spine compression fracture, osteoporosis.  Current smoking: no  Steroid Use: as above  Rheumatoid  arthritis: no  Alcohol (3 or more units per day): no  Other medications known to affect BMD: no  Reported loss of height: no  Tendency for falls: no  GI history:  Malabsorption (IBD, Celiac, gastric bypass ): no  History of kidney stones: yes, 15 years ago  History of thyroid disease: yes   Physical activity: regular exercise, weight lifting and rowing machine  Weight history: overall stable.          -----------------------------------------------------------      He has hashimoto hypothyroidism diagnosed long time ago. He is currently on 137 mcg down from 150 mcg based on labs from 10/2022.  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    Problem List     Patient Active Problem List   Diagnosis    Hypothyroidism    Pre-operative cardiovascular examination    Routine general medical examination at a health care facility    Calculus of ureter (URETERAL)    Exposure to strep throat    Myasthenia gravis with exacerbation (H)    Myasthenia gravis without exacerbation (H)        Medications     Current Outpatient Medications   Medication    albuterol (PROAIR HFA/PROVENTIL HFA/VENTOLIN HFA) 108 (90 Base) MCG/ACT inhaler    levothyroxine (SYNTHROID/LEVOTHROID) 137 MCG tablet    predniSONE (DELTASONE) 5 MG tablet    pyridostigmine (MESTINON) 60 MG tablet    Vitamin D, Cholecalciferol, 25 MCG (1000 UT) TABS    apraclonidine (IOPIDINE) 0.5 % ophthalmic solution    bisacodyl (DULCOLAX) 5 MG EC tablet    levothyroxine (SYNTHROID/LEVOTHROID) 150 MCG tablet    polyethylene glycol (GOLYTELY) 236 g suspension    predniSONE (DELTASONE) 1 MG tablet    predniSONE (DELTASONE) 10 MG tablet    predniSONE (DELTASONE) 5 MG tablet    predniSONE (DELTASONE) 5 MG tablet    pyridostigmine (MESTINON) 60 MG tablet     No current facility-administered medications for this visit.        Allergies     Allergies   Allergen Reactions    Nkda [No Known Drug Allergy]        Medical / Surgical History     Past Medical History:   Diagnosis Date    Asthma     Kidney stone     Myasthenia gravis (H)     Strabismus     Thyroid disease      Past Surgical History:   Procedure Laterality  Date    COLONOSCOPY N/A 1/13/2023    Procedure: COLONOSCOPY;  Surgeon: Trevor Perez MD;  Location: [facility] OR    HERNIA REPAIR      LASER HOLMIUM LITHOTRIPSY URETER(S), INSERT STENT, COMBINED Right 10/15/2014    Procedure: COMBINED CYSTOSCOPY, URETEROSCOPY, LASER HOLMIUM LITHOTRIPSY URETER(S), INSERT STENT;  Surgeon: Thong Bates MD;  Location: [facility] OR    RECESSION RESECTION WITH ADJUSTABLE SUTURE  9/4/2012    LLRc 5.0mm on adj. LIRc 4.5mm on adj.    RECESSION RESECTION WITH ADJUSTABLE SUTURE BILATERAL  12/18/2012    RSRc 3.0mm; adj. suture    STRABISMUS SURGERY      THORACOSCOPIC THYMECTOMY N/A 3/22/2021    Procedure: SUBXIPHOID BILATERAL THORACOSCOPIC THYMECTOMY;  Surgeon: Benjy Hull MD;  Location: [facility] OR       Social History     Social History     Socioeconomic History    Marital status: Single     Spouse name: Not on file    Number of children: Not on file    Years of education: Not on file    Highest education level: Not on file   Occupational History    Not on file   Tobacco Use    Smoking status: Never    Smokeless tobacco: Never   Substance and Sexual Activity    Alcohol use: No    Drug use: No    Sexual activity: Not on file   Other Topics Concern    Not on file   Social History Narrative    Not on file     Social Determinants of Health     Financial Resource Strain: Not on file   Food Insecurity: Not on file   Transportation Needs: Not on file   Physical Activity: Not on file   Stress: Not on file   Social Connections: Not on file   Intimate Partner Violence: Not on file   Housing Stability: Not on file       Family History     Family History   Problem Relation Age of Onset    Coronary Artery Disease Mother 59    Valvular heart disease Father     Heart Failure Father     Heart Disease Maternal Grandmother     Heart Disease Maternal Grandfather     Thyroid Disease Sister        ROS     12 ROS completed, pertinent positive and negative in HPI    Physical Exam   There were no vitals  taken for this visit.   GENERAL: Healthy, alert and no distress  EYES: Eyes grossly normal to inspection.  No discharge or erythema, or obvious scleral/conjunctival abnormalities.  RESP: No audible wheeze, cough, or visible cyanosis.  No visible retractions or increased work of breathing.    SKIN: Visible skin clear. No significant rash, abnormal pigmentation or lesions.  NEURO: Cranial nerves grossly intact.  Mentation and speech appropriate for age.  PSYCH: Mentation appears normal, affect normal/bright, judgement and insight intact, normal speech and appearance well-groomed.     Labs/Imaging     Pertinent Labs were reviewed and updated in EPIC and discussed briefly.  Radiology Results were  reviewed and updated in EPIC and discussed briefly.    Summary of recent findings:   Lab Results   Component Value Date    A1C 5.2 10/14/2020    A1C 5.4 09/30/2019       TSH   Date Value Ref Range Status   03/21/2023 0.70 0.30 - 4.20 uIU/mL Final   10/28/2022 0.15 (L) 0.30 - 4.20 uIU/mL Final   04/13/2021 3.45 0.40 - 4.00 mU/L Final   03/17/2021 1.01 0.40 - 4.00 mU/L Final   01/20/2021 5.77 (H) 0.40 - 4.00 mU/L Final   09/30/2019 1.05 0.40 - 4.00 mU/L Final   06/11/2018 1.70 0.40 - 4.00 mU/L Final     T4 Free   Date Value Ref Range Status   01/20/2021 1.22 0.76 - 1.46 ng/dL Final   06/09/2017 1.19 0.76 - 1.46 ng/dL Final   05/27/2010 1.47 0.70 - 1.85 ng/dL Final   04/07/2010 0.64 (L) 0.70 - 1.85 ng/dL Final   04/06/2010 0.69 (L) 0.70 - 1.85 ng/dL Final     Comment:     Ordered by lab     Free T4   Date Value Ref Range Status   10/28/2022 1.53 0.90 - 1.70 ng/dL Final       Creatinine   Date Value Ref Range Status   10/28/2022 1.06 0.67 - 1.17 mg/dL Final   04/13/2021 0.92 0.66 - 1.25 mg/dL Final       Recent Labs   Lab Test 10/28/22  0616 10/14/20  1417   CHOL 231* 266*   HDL 35* 52   * 173*   TRIG 184* 205*       No results found for: [id number], [id number], [id number]    I personally reviewed the patient's outside  records from [facility] EMR. Summary of pertinent findings in HPI.    Impression / Plan     1. Osteopenia  His main risk factor is chronic intermittent high dose steroid use for MS. We will complete secondary work up with lab below given his relatively young age.  We discussed management of low bone density in men, focusing on steroid induced osteoporosis. His FRAX score reported on his dexa report as 7.4/1.5 when he was not taking prednisone. When I changed to current use of steroids it will be 13/3.7.  We discussed Frax as a limited tool to guide in the treatment of steroid induced osteoporosis and osteoporosis prevention. We discussed that if he foresee chronic use of more than 7.5 mg prednisone daily we should start a bisphosphonate. He thinks that by November he will have a better answer about his prednisone after he tries a new med for his MS.      Hypothyroidism  On lt4 at  137 mcg daily. Last TSH 3/2023 wnl.    Test and/or medications prescribed today:  Orders Placed This Encounter   Procedures    25 Hydroxyvitamin D2 and D3    Albumin level    Bone specific alk phosphatase    Calcium    Magnesium    Parathyroid Hormone Intact    Phosphorus    Creatinine    TSH with free T4 reflex    Tissue transglutaminase cyndy IgA and IgG    IgA    Kappa and lambda light chain    Testosterone Free and Total    Protein electrophoresis         Follow up: 6 month      Logan Dick MD  Endocrinology, Diabetes and Metabolism  [facility]  Answers submitted by the patient for this visit:  Symptoms you have experienced in the last 30 days (Submitted on 8/8/2023)  General Symptoms: No  Skin Symptoms: No  HENT Symptoms: No  EYE SYMPTOMS: No  HEART SYMPTOMS: No  LUNG SYMPTOMS: No  INTESTINAL SYMPTOMS: No  URINARY SYMPTOMS: No  REPRODUCTIVE SYMPTOMS: No  SKELETAL SYMPTOMS: No  BLOOD SYMPTOMS: No  NERVOUS SYSTEM SYMPTOMS: No  MENTAL HEALTH SYMPTOMS: No

## 2023-08-08 NOTE — LETTER
8/8/2023       RE: Leonidas Pratt  [address]  [place] [address]     Dear Colleague,    Thank you for referring your patient, Leonidas Pratt, to the [facility] ENDOCRINOLOGY CLINIC [place] [facility]. Please see a copy of my visit note below.    Endocrinology Clinic Visit 8/8/2023      Video-Visit Details    Type of service:  Video Visit  Joined the call at 8/8/2023, 1:41:07 pm.  Left the call at 8/8/2023, 2:36:27 pm.    Originating Location (pt. Location): Home        Distant Location (provider location):  Off-site    Mode of Communication:  Video Conference via [employer]    Physician has received verbal consent for a Video Visit from the patient? Yes    I spent a total of 60 minutes on the date of encounter reviewing medical records, evaluating the patient, coordinating care and documenting in the EHR, as detailed above.      NAME:  Leonidas Pratt  PCP:  Moreno Marion  MRN:  [id number]  Reason for Consult:  osteopenia   Requesting Provider:  Moreno Marion    Chief Complaint     Chief Complaint   Patient presents with    Consult       History of Present Illness     Leonidas Pratt is a 58 year old male who is seen in video visit for osteopenia     He had MS for 15 years, difficult to manage. He reported being on 20 mg prednisone twice a year for 2 month. He said when he was a kid he was also on prednisone for asthma for 3 month per year.   He is currently on prednisone 5 mg , tapering down over the past 3 month.     The patient had a DXA scan on 11/2022 which showed: lowest T score of -2.4 at the right femoral head  Calcium intake:   Dietary: he reported getting 1200 mg of calcium per day  Supplements: 1 mark per day.    Vitamin D intake:   Supplements: 1000 international unit(s) daily  Last vitamin D level was 31 in 2017, PTH was 91 and had one time calcium was 7.8    Osteoporosis Risk factors:   Previous fractures:  no  Family history of fragility fracture in parent: father with spine compression fracture, osteoporosis.  Current smoking: no  Steroid Use: as above  Rheumatoid  arthritis: no  Alcohol (3 or more units per day): no  Other medications known to affect BMD: no  Reported loss of height: no  Tendency for falls: no  GI history: Malabsorption (IBD, Celiac, gastric bypass ): no  History of kidney stones: yes, 15 years ago  History of thyroid disease: yes   Physical activity: regular exercise, weight lifting and rowing machine  Weight history: overall stable.          -----------------------------------------------------------      He has hashimoto hypothyroidism diagnosed long time ago. He is currently on 137 mcg down from 150 mcg based on labs from 10/2022.  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    Problem List     Patient Active Problem List   Diagnosis    Hypothyroidism    Pre-operative cardiovascular examination    Routine general medical examination at a health care facility    Calculus of ureter (URETERAL)    Exposure to strep throat    Myasthenia gravis with exacerbation (H)    Myasthenia gravis without exacerbation (H)        Medications     Current Outpatient Medications   Medication    albuterol (PROAIR HFA/PROVENTIL HFA/VENTOLIN HFA) 108 (90 Base) MCG/ACT inhaler    levothyroxine (SYNTHROID/LEVOTHROID) 137 MCG tablet    predniSONE (DELTASONE) 5 MG tablet    pyridostigmine (MESTINON) 60 MG tablet    Vitamin D, Cholecalciferol, 25 MCG (1000 UT) TABS    apraclonidine (IOPIDINE) 0.5 % ophthalmic solution    bisacodyl (DULCOLAX) 5 MG EC tablet    levothyroxine (SYNTHROID/LEVOTHROID) 150 MCG tablet    polyethylene glycol (GOLYTELY) 236 g suspension    predniSONE (DELTASONE) 1 MG tablet    predniSONE (DELTASONE) 10 MG tablet    predniSONE (DELTASONE) 5 MG tablet    predniSONE (DELTASONE) 5 MG tablet    pyridostigmine (MESTINON) 60 MG tablet     No current facility-administered  medications for this visit.        Allergies     Allergies   Allergen Reactions    Nkda [No Known Drug Allergy]        Medical / Surgical History     Past Medical History:   Diagnosis Date    Asthma     Kidney stone     Myasthenia gravis (H)     Strabismus     Thyroid disease      Past Surgical History:   Procedure Laterality Date    COLONOSCOPY N/A 1/13/2023    Procedure: COLONOSCOPY;  Surgeon: Trevor Perez MD;  Location: [facility] OR    HERNIA REPAIR      LASER HOLMIUM LITHOTRIPSY URETER(S), INSERT STENT, COMBINED Right 10/15/2014    Procedure: COMBINED CYSTOSCOPY, URETEROSCOPY, LASER HOLMIUM LITHOTRIPSY URETER(S), INSERT STENT;  Surgeon: Thong Bates MD;  Location[facility] OR    RECESSION RESECTION WITH ADJUSTABLE SUTURE  9/4/2012    LLRc 5.0mm on adj. LIRc 4.5mm on adj.    RECESSION RESECTION WITH ADJUSTABLE SUTURE BILATERAL  12/18/2012    RSRc 3.0mm; adj. suture    STRABISMUS SURGERY      THORACOSCOPIC THYMECTOMY N/A 3/22/2021    Procedure: SUBXIPHOID BILATERAL THORACOSCOPIC THYMECTOMY;  Surgeon: Benjy Hull MD;  Location: [facility] OR       Social History     Social History     Socioeconomic History    Marital status: Single     Spouse name: Not on file    Number of children: Not on file    Years of education: Not on file    Highest education level: Not on file   Occupational History    Not on file   Tobacco Use    Smoking status: Never    Smokeless tobacco: Never   Substance and Sexual Activity    Alcohol use: No    Drug use: No    Sexual activity: Not on file   Other Topics Concern    Not on file   Social History Narrative    Not on file     Social Determinants of Health     Financial Resource Strain: Not on file   Food Insecurity: Not on file   Transportation Needs: Not on file   Physical Activity: Not on file   Stress: Not on file   Social Connections: Not on file   Intimate Partner Violence: Not on file   Housing Stability: Not on file       Family History     Family History   Problem  Relation Age of Onset    Coronary Artery Disease Mother 59    Valvular heart disease Father     Heart Failure Father     Heart Disease Maternal Grandmother     Heart Disease Maternal Grandfather     Thyroid Disease Sister        ROS     12 ROS completed, pertinent positive and negative in HPI    Physical Exam   There were no vitals taken for this visit.   GENERAL: Healthy, alert and no distress  EYES: Eyes grossly normal to inspection.  No discharge or erythema, or obvious scleral/conjunctival abnormalities.  RESP: No audible wheeze, cough, or visible cyanosis.  No visible retractions or increased work of breathing.    SKIN: Visible skin clear. No significant rash, abnormal pigmentation or lesions.  NEURO: Cranial nerves grossly intact.  Mentation and speech appropriate for age.  PSYCH: Mentation appears normal, affect normal/bright, judgement and insight intact, normal speech and appearance well-groomed.     Labs/Imaging     Pertinent Labs were reviewed and updated in EPIC and discussed briefly.  Radiology Results were  reviewed and updated in EPIC and discussed briefly.    Summary of recent findings:   Lab Results   Component Value Date    A1C 5.2 10/14/2020    A1C 5.4 09/30/2019       TSH   Date Value Ref Range Status   03/21/2023 0.70 0.30 - 4.20 uIU/mL Final   10/28/2022 0.15 (L) 0.30 - 4.20 uIU/mL Final   04/13/2021 3.45 0.40 - 4.00 mU/L Final   03/17/2021 1.01 0.40 - 4.00 mU/L Final   01/20/2021 5.77 (H) 0.40 - 4.00 mU/L Final   09/30/2019 1.05 0.40 - 4.00 mU/L Final   06/11/2018 1.70 0.40 - 4.00 mU/L Final     T4 Free   Date Value Ref Range Status   01/20/2021 1.22 0.76 - 1.46 ng/dL Final   06/09/2017 1.19 0.76 - 1.46 ng/dL Final   05/27/2010 1.47 0.70 - 1.85 ng/dL Final   04/07/2010 0.64 (L) 0.70 - 1.85 ng/dL Final   04/06/2010 0.69 (L) 0.70 - 1.85 ng/dL Final     Comment:     Ordered by lab     Free T4   Date Value Ref Range Status   10/28/2022 1.53 0.90 - 1.70 ng/dL Final       Creatinine   Date Value Ref  Range Status   10/28/2022 1.06 0.67 - 1.17 mg/dL Final   04/13/2021 0.92 0.66 - 1.25 mg/dL Final       Recent Labs   Lab Test 10/28/22  0616 10/14/20  1417   CHOL 231* 266*   HDL 35* 52   * 173*   TRIG 184* 205*       No results found for: [id number], [id number], [id number]    I personally reviewed the patient's outside records from [facility] EMR. Summary of pertinent findings in HPI.    Impression / Plan     1. Osteopenia  His main risk factor is chronic intermittent high dose steroid use for MS. We will complete secondary work up with lab below given his relatively young age.  We discussed management of low bone density in men, focusing on steroid induced osteoporosis. His FRAX score reported on his dexa report as 7.4/1.5 when he was not taking prednisone. When I changed to current use of steroids it will be 13/3.7.  We discussed Frax as a limited tool to guide in the treatment of steroid induced osteoporosis and osteoporosis prevention. We discussed that if he foresee chronic use of more than 7.5 mg prednisone daily we should start a bisphosphonate. He thinks that by November he will have a better answer about his prednisone after he tries a new med for his MS.      Hypothyroidism  On lt4 at  137 mcg daily. Last TSH 3/2023 wnl.    Test and/or medications prescribed today:  Orders Placed This Encounter   Procedures    25 Hydroxyvitamin D2 and D3    Albumin level    Bone specific alk phosphatase    Calcium    Magnesium    Parathyroid Hormone Intact    Phosphorus    Creatinine    TSH with free T4 reflex    Tissue transglutaminase cyndy IgA and IgG    IgA    Kappa and lambda light chain    Testosterone Free and Total    Protein electrophoresis         Follow up: 6 month      Logan Dick MD  Endocrinology, Diabetes and Metabolism  [facility]  Answers submitted by the patient for this visit:  Symptoms you have experienced in the last 30 days (Submitted on 8/8/2023)  General Symptoms: No  Skin  Symptoms: No  HENT Symptoms: No  EYE SYMPTOMS: No  HEART SYMPTOMS: No  LUNG SYMPTOMS: No  INTESTINAL SYMPTOMS: No  URINARY SYMPTOMS: No  REPRODUCTIVE SYMPTOMS: No  SKELETAL SYMPTOMS: No  BLOOD SYMPTOMS: No  NERVOUS SYSTEM SYMPTOMS: No  MENTAL HEALTH SYMPTOMS: No

## 2023-08-14 NOTE — TELEPHONE ENCOUNTER
Vaccines completed on 8/4/23. Scheduled for first Ultomiris infusion on 8/18 at [facility]. Ultomiris start form faxed into [employer].    Lynda Smith RN

## 2023-08-17 ENCOUNTER — TELEPHONE (OUTPATIENT)
Dept: NEUROLOGY | Facility: CLINIC | Age: 58
End: 2023-08-17

## 2023-08-17 NOTE — TELEPHONE ENCOUNTER
[facility] Call Center    Phone Message    May a detailed message be left on voicemail: yes     Reason for Call:     Marisa at [facility] pt support called to speak to care team on how they can help with pts infusion, please call back     P      Action Taken: Message routed to:  [facility] ([facility]): sara neurology    Travel Screening: Not Applicable

## 2023-08-17 NOTE — TELEPHONE ENCOUNTER
[facility] left for Marisa at [facility] informing her that pt is set to start Ultomiris this Friday, and should be good to go. Asked Marisa to call back if there is anything [facility] needs from us.     Pt has completed the required vaccinations and is starting Ultomiris based on the timing recommended by Dr Jackson.     Jing Whittington RN

## 2023-08-18 ENCOUNTER — MYC MEDICAL ADVICE (OUTPATIENT)
Dept: ENDOCRINOLOGY | Facility: CLINIC | Age: 58
End: 2023-08-18

## 2023-08-18 ENCOUNTER — INFUSION THERAPY VISIT (OUTPATIENT)
Dept: INFUSION THERAPY | Facility: CLINIC | Age: 58
End: 2023-08-18
Attending: PSYCHIATRY & NEUROLOGY
Payer: COMMERCIAL

## 2023-08-18 VITALS
RESPIRATION RATE: 16 BRPM | TEMPERATURE: 98.2 F | SYSTOLIC BLOOD PRESSURE: 115 MMHG | WEIGHT: 171.6 LBS | HEART RATE: 76 BPM | OXYGEN SATURATION: 97 % | DIASTOLIC BLOOD PRESSURE: 74 MMHG | BODY MASS INDEX: 26.48 KG/M2

## 2023-08-18 DIAGNOSIS — G70.00 MYASTHENIA GRAVIS WITHOUT EXACERBATION (H): Primary | ICD-10-CM

## 2023-08-18 DIAGNOSIS — E03.9 HYPOTHYROIDISM, UNSPECIFIED TYPE: Primary | ICD-10-CM

## 2023-08-18 DIAGNOSIS — M85.80 OSTEOPENIA, UNSPECIFIED LOCATION: ICD-10-CM

## 2023-08-18 LAB
ALBUMIN SERPL BCG-MCNC: 4.9 G/DL (ref 3.5–5.2)
CALCIUM SERPL-MCNC: 9.6 MG/DL (ref 8.6–10)
CREAT SERPL-MCNC: 0.79 MG/DL (ref 0.67–1.17)
GFR SERPL CREATININE-BSD FRML MDRD: >90 ML/MIN/1.73M2
MAGNESIUM SERPL-MCNC: 2.1 MG/DL (ref 1.7–2.3)
PHOSPHATE SERPL-MCNC: 3 MG/DL (ref 2.5–4.5)
PTH-INTACT SERPL-MCNC: 64 PG/ML (ref 15–65)
T4 FREE SERPL-MCNC: 1.76 NG/DL (ref 0.9–1.7)
TOTAL PROTEIN SERUM FOR ELP: 6.7 G/DL (ref 6.4–8.3)
TSH SERPL DL<=0.005 MIU/L-ACNC: 0.16 UIU/ML (ref 0.3–4.2)

## 2023-08-18 PROCEDURE — 84100 ASSAY OF PHOSPHORUS: CPT

## 2023-08-18 PROCEDURE — 84443 ASSAY THYROID STIM HORMONE: CPT

## 2023-08-18 PROCEDURE — 84155 ASSAY OF PROTEIN SERUM: CPT

## 2023-08-18 PROCEDURE — 83970 ASSAY OF PARATHORMONE: CPT

## 2023-08-18 PROCEDURE — 36415 COLL VENOUS BLD VENIPUNCTURE: CPT

## 2023-08-18 PROCEDURE — 84080 ASSAY ALKALINE PHOSPHATASES: CPT

## 2023-08-18 PROCEDURE — 82040 ASSAY OF SERUM ALBUMIN: CPT

## 2023-08-18 PROCEDURE — 86364 TISS TRNSGLTMNASE EA IG CLAS: CPT

## 2023-08-18 PROCEDURE — 83521 IG LIGHT CHAINS FREE EACH: CPT

## 2023-08-18 PROCEDURE — 82565 ASSAY OF CREATININE: CPT

## 2023-08-18 PROCEDURE — 96365 THER/PROPH/DIAG IV INF INIT: CPT

## 2023-08-18 PROCEDURE — 250N000011 HC RX IP 250 OP 636: Mod: JZ | Performed by: PSYCHIATRY & NEUROLOGY

## 2023-08-18 PROCEDURE — 82310 ASSAY OF CALCIUM: CPT

## 2023-08-18 PROCEDURE — 84165 PROTEIN E-PHORESIS SERUM: CPT | Mod: 26

## 2023-08-18 PROCEDURE — 82306 VITAMIN D 25 HYDROXY: CPT

## 2023-08-18 PROCEDURE — 84403 ASSAY OF TOTAL TESTOSTERONE: CPT

## 2023-08-18 PROCEDURE — 258N000003 HC RX IP 258 OP 636: Performed by: PSYCHIATRY & NEUROLOGY

## 2023-08-18 PROCEDURE — 82784 ASSAY IGA/IGD/IGG/IGM EACH: CPT

## 2023-08-18 PROCEDURE — 84439 ASSAY OF FREE THYROXINE: CPT

## 2023-08-18 PROCEDURE — 84165 PROTEIN E-PHORESIS SERUM: CPT | Mod: TC | Performed by: PATHOLOGY

## 2023-08-18 PROCEDURE — 84270 ASSAY OF SEX HORMONE GLOBUL: CPT

## 2023-08-18 PROCEDURE — 83735 ASSAY OF MAGNESIUM: CPT

## 2023-08-18 RX ORDER — HEPARIN SODIUM,PORCINE 10 UNIT/ML
5-20 VIAL (ML) INTRAVENOUS DAILY PRN
Status: CANCELLED | OUTPATIENT
Start: 2023-09-01

## 2023-08-18 RX ORDER — EPINEPHRINE 1 MG/ML
0.3 INJECTION, SOLUTION INTRAMUSCULAR; SUBCUTANEOUS EVERY 5 MIN PRN
Status: CANCELLED | OUTPATIENT
Start: 2023-09-01

## 2023-08-18 RX ORDER — LEVOTHYROXINE SODIUM 125 UG/1
125 TABLET ORAL DAILY
Qty: 90 TABLET | Refills: 1 | Status: SHIPPED | OUTPATIENT
Start: 2023-08-18 | End: 2024-04-18

## 2023-08-18 RX ORDER — DIPHENHYDRAMINE HYDROCHLORIDE 50 MG/ML
50 INJECTION INTRAMUSCULAR; INTRAVENOUS
Status: CANCELLED
Start: 2023-09-01

## 2023-08-18 RX ORDER — ALBUTEROL SULFATE 90 UG/1
1-2 AEROSOL, METERED RESPIRATORY (INHALATION)
Status: CANCELLED
Start: 2023-09-01

## 2023-08-18 RX ORDER — MEPERIDINE HYDROCHLORIDE 25 MG/ML
25 INJECTION INTRAMUSCULAR; INTRAVENOUS; SUBCUTANEOUS EVERY 30 MIN PRN
Status: CANCELLED | OUTPATIENT
Start: 2023-09-01

## 2023-08-18 RX ORDER — ALBUTEROL SULFATE 0.83 MG/ML
2.5 SOLUTION RESPIRATORY (INHALATION)
Status: CANCELLED | OUTPATIENT
Start: 2023-09-01

## 2023-08-18 RX ORDER — HEPARIN SODIUM (PORCINE) LOCK FLUSH IV SOLN 100 UNIT/ML 100 UNIT/ML
5 SOLUTION INTRAVENOUS
Status: CANCELLED | OUTPATIENT
Start: 2023-09-01

## 2023-08-18 RX ORDER — METHYLPREDNISOLONE SODIUM SUCCINATE 125 MG/2ML
125 INJECTION, POWDER, LYOPHILIZED, FOR SOLUTION INTRAMUSCULAR; INTRAVENOUS
Status: CANCELLED
Start: 2023-09-01

## 2023-08-18 RX ADMIN — RAVULIZUMAB 2700 MG: 300 SOLUTION, CONCENTRATE INTRAVENOUS at 12:45

## 2023-08-18 NOTE — PROGRESS NOTES
Infusion Nursing Note:  Leonidas Pratt presents today for   Chief Complaint   Patient presents with    Infusion     Ultomiris   Patient seen by provider today: No   present during visit today: Not Applicable.    Note:  Frequency: First Ultomiris infusion today - loading dose given per orders. Maintenance dose to be given in 2 weeks. Subsequent maintenance infusions will be every 8 weeks  Labs: Orders from Dr. Sanchez drawn per patient's request  Premedications: were not ordered.  Infusion Rate/Length: Ultomiris infused at 92 mL/hr per orders and based off of patient's weight of 77.8 kg. Infusion given over approximately 40 minutes    Intravenous Access:  Labs drawn without difficulty.  Peripheral IV placed.    Treatment Conditions:  Per chart review: patient completed required vaccinations and is starting Ultomiris based on the timing recommended by Dr Jackson    /73   Pulse 68   Temp 98.4  F (36.9  C) (Oral)   Resp 16   Wt 77.8 kg (171 lb 9.6 oz)   SpO2 96%   BMI 26.48 kg/m      Post Infusion Assessment:  Patient tolerated infusion without incident.  Patient observed for 60 minutes post Ultomiris infusion per orders.  Blood return noted pre and post infusion.  Site patent and intact, free from redness, edema or discomfort.  No evidence of extravasations.  Access discontinued per protocol.     /74 (BP Location: Left arm, Patient Position: Sitting, Cuff Size: Adult Regular)   Pulse 76   Temp 98.2  F (36.8  C) (Oral)   Resp 16   Wt 77.8 kg (171 lb 9.6 oz)   SpO2 97%   BMI 26.48 kg/m      Discharge Plan:   Discharge instructions reviewed with: Patient.  Patient and/or family verbalized understanding of discharge instructions and all questions answered.  AVS to patient via [employer].  Patient will return 9/1/23 for next appointment.   Patient discharged in stable condition accompanied by: self.  Departure Mode: Ambulatory.    Administrations This Visit       ravulizumab-cwvz  (ULTOMIRIS) 2,700 mg in sodium chloride 0.9 % 54 mL infusion       Admin Date  08/18/2023 Action  $New Bag Dose  2,700 mg Rate  92 mL/hr Route  Intravenous Administered By  Abeba Hess, RN                  Abeba Hess, RN

## 2023-08-18 NOTE — PATIENT INSTRUCTIONS
Dear Leonidas Pratt    Thank you for choosing [facility] ([facility]) for your infusion.  The following information is a summary of our appointment as well as important reminders.     We look forward in seeing you on your next appointment here at [facility] ([facility]).  Please don t hesitate to call us at [phone] to reschedule any of your appointments or to speak with one of the [facility] registered nurses.  It was a pleasure taking care of you today.    Sincerely,    [facility]  [facility]  [address] [place] [address]  [address]  Phone:  [phone]

## 2023-08-19 LAB
ALP BONE SERPL-MCNC: 14.5 UG/L
SHBG SERPL-SCNC: 40 NMOL/L (ref 11–80)

## 2023-08-21 LAB
ALBUMIN SERPL ELPH-MCNC: 4.8 G/DL (ref 3.7–5.1)
ALPHA1 GLOB SERPL ELPH-MCNC: 0.2 G/DL (ref 0.2–0.4)
ALPHA2 GLOB SERPL ELPH-MCNC: 0.6 G/DL (ref 0.5–0.9)
B-GLOBULIN SERPL ELPH-MCNC: 0.6 G/DL (ref 0.6–1)
GAMMA GLOB SERPL ELPH-MCNC: 0.5 G/DL (ref 0.7–1.6)
IGA SERPL-MCNC: 97 MG/DL (ref 84–499)
KAPPA LC FREE SER-MCNC: 1.31 MG/DL (ref 0.33–1.94)
KAPPA LC FREE/LAMBDA FREE SER NEPH: 1.64 {RATIO} (ref 0.26–1.65)
LAMBDA LC FREE SERPL-MCNC: 0.8 MG/DL (ref 0.57–2.63)
M PROTEIN SERPL ELPH-MCNC: 0 G/DL
PROT PATTERN SERPL ELPH-IMP: ABNORMAL
TTG IGA SER-ACNC: <0.2 U/ML
TTG IGG SER-ACNC: <0.6 U/ML

## 2023-08-22 LAB
TESTOST FREE SERPL-MCNC: 3.94 NG/DL
TESTOST SERPL-MCNC: 231 NG/DL (ref 240–950)

## 2023-08-26 LAB
DEPRECATED CALCIDIOL+CALCIFEROL SERPL-MC: <39 UG/L (ref 20–75)
VITAMIN D2 SERPL-MCNC: <5 UG/L
VITAMIN D3 SERPL-MCNC: 34 UG/L

## 2023-09-01 ENCOUNTER — INFUSION THERAPY VISIT (OUTPATIENT)
Dept: INFUSION THERAPY | Facility: CLINIC | Age: 58
End: 2023-09-01
Attending: PSYCHIATRY & NEUROLOGY
Payer: COMMERCIAL

## 2023-09-01 VITALS
HEART RATE: 77 BPM | DIASTOLIC BLOOD PRESSURE: 74 MMHG | WEIGHT: 173.3 LBS | RESPIRATION RATE: 16 BRPM | BODY MASS INDEX: 26.74 KG/M2 | OXYGEN SATURATION: 95 % | TEMPERATURE: 98.2 F | SYSTOLIC BLOOD PRESSURE: 123 MMHG

## 2023-09-01 DIAGNOSIS — G70.00 MYASTHENIA GRAVIS WITHOUT EXACERBATION (H): Primary | ICD-10-CM

## 2023-09-01 PROCEDURE — 250N000011 HC RX IP 250 OP 636: Mod: JZ | Performed by: PSYCHIATRY & NEUROLOGY

## 2023-09-01 PROCEDURE — 258N000003 HC RX IP 258 OP 636: Performed by: PSYCHIATRY & NEUROLOGY

## 2023-09-01 PROCEDURE — 96365 THER/PROPH/DIAG IV INF INIT: CPT

## 2023-09-01 RX ORDER — HEPARIN SODIUM,PORCINE 10 UNIT/ML
5-20 VIAL (ML) INTRAVENOUS DAILY PRN
OUTPATIENT
Start: 2023-10-27

## 2023-09-01 RX ORDER — ALBUTEROL SULFATE 0.83 MG/ML
2.5 SOLUTION RESPIRATORY (INHALATION)
OUTPATIENT
Start: 2023-10-27

## 2023-09-01 RX ORDER — MEPERIDINE HYDROCHLORIDE 25 MG/ML
25 INJECTION INTRAMUSCULAR; INTRAVENOUS; SUBCUTANEOUS EVERY 30 MIN PRN
OUTPATIENT
Start: 2023-10-27

## 2023-09-01 RX ORDER — HEPARIN SODIUM (PORCINE) LOCK FLUSH IV SOLN 100 UNIT/ML 100 UNIT/ML
5 SOLUTION INTRAVENOUS
OUTPATIENT
Start: 2023-10-27

## 2023-09-01 RX ORDER — METHYLPREDNISOLONE SODIUM SUCCINATE 125 MG/2ML
125 INJECTION, POWDER, LYOPHILIZED, FOR SOLUTION INTRAMUSCULAR; INTRAVENOUS
Start: 2023-10-27

## 2023-09-01 RX ORDER — ALBUTEROL SULFATE 90 UG/1
1-2 AEROSOL, METERED RESPIRATORY (INHALATION)
Start: 2023-10-27

## 2023-09-01 RX ORDER — DIPHENHYDRAMINE HYDROCHLORIDE 50 MG/ML
50 INJECTION INTRAMUSCULAR; INTRAVENOUS
Start: 2023-10-27

## 2023-09-01 RX ORDER — EPINEPHRINE 1 MG/ML
0.3 INJECTION, SOLUTION INTRAMUSCULAR; SUBCUTANEOUS EVERY 5 MIN PRN
OUTPATIENT
Start: 2023-10-27

## 2023-09-01 RX ADMIN — RAVULIZUMAB 3300 MG: 1100 SOLUTION, CONCENTRATE INTRAVENOUS at 12:14

## 2023-09-01 NOTE — PROGRESS NOTES
Chief Complaint   Patient presents with    Infusion     IV Ultomiris     Infusion Nursing Note:  Leonidas Pratt presents today for IV Ultomiris. Dose 2. Now every 8 weeks.  Patient seen by provider today: No   present during visit today: Not Applicable.    Note:  Ultomiris infused over 40 minutes. + 1 hour obs.      Intravenous Access:  Peripheral IV placed.  No labs due.     Treatment Conditions:  Biological Infusion Checklist:  ~~~ NOTE: If the patient answers yes to any of the questions below, hold the infusion and contact ordering provider or on-call provider.    Have you recently had an elevated temperature, fever, chills, productive cough, coughing for 3 weeks or longer or hemoptysis,  abnormal vital signs, night sweats,  chest pain or have you noticed a decrease in your appetite, unexplained weight loss or fatigue? No  Do you have any open wounds or new incisions? No  Do you have any upcoming hospitalizations or surgeries? Does not include esophagogastroduodenoscopy, colonoscopy, endoscopic retrograde cholangiopancreatography (ERCP), endoscopic ultrasound (EUS), dental procedures or joint aspiration/steroid injections No  Do you currently have any signs of illness or infection or are you on any antibiotics? No  Have you had any new, sudden or worsening abdominal pain? No  Have you or anyone in your household received a live vaccination in the past 4 weeks? Please note: No live vaccines while on biologic/chemotherapy until 6 months after the last treatment. Patient can receive the flu vaccine (shot only), pneumovax and the Covid vaccine. It is optimal for the patient to get these vaccines mid cycle, but they can be given at any time as long as it is not on the day of the infusion. No  Have you recently been diagnosed with any new nervous system diseases (ie. Multiple sclerosis, Guillain Jarales, seizures, neurological changes) or cancer diagnosis? Are you on any form of radiation or chemotherapy?  "No  Are you pregnant or breast feeding or do you have plans of pregnancy in the future? No  Have you been having any signs of worsening depression or suicidal ideations?  (benlysta only) No  Have there been any other new onset medical symptoms? No  Have you had any new blood clots? (IVIG only) No    Post Infusion Assessment:  Patient tolerated infusion without incident.  Patient observed for 60 minutes post Ultomiris per protocol.  Blood return noted pre and post infusion.  Site patent and intact, free from redness, edema or discomfort.  No evidence of extravasations.  Access discontinued per protocol.     POST-INFUSION OF BIOLOGICAL MEDICATION:  Reviewed with patient.  Given biologic medication or medication hand-out. Inform patient if any fever, chills or signs of infection, new symptoms, abdominal pain, heart palpitations, shortness of breath, reaction, weakness, neurological changes, seek medical attention immediately and should not receive infusions. No live virus vaccines prior to or during treatment or up to 6 months post infusion. If the patient has an upcoming procedure or surgery, this should be discussed with the rheumatologist and surgeon or provider.    Discharge Plan:   AVS to patient via MYCHART.  Patient will continue infusions at the [place] location.   Patient discharged in stable condition accompanied by: self.  Departure Mode: Ambulatory.    Administrations This Visit       ravulizumab-cwvz (ULTOMIRIS) 3,300 mg in sodium chloride 0.9 % 66 mL infusion       Admin Date  09/01/2023 Action  $New Bag Dose  3,300 mg Rate  95 mL/hr Route  Intravenous Administered By  Sesar Monique, THIAGO                  Vital signs:  Temp: 98.2  F (36.8  C) Temp src: Oral BP: 134/81 Pulse: 77   Resp: 18 SpO2: 95 % O2 Device: None (Room air)     Weight: 78.6 kg (173 lb 4.8 oz)  Estimated body mass index is 26.74 kg/m  as calculated from the following:    Height as of 2/10/23: 1.715 m (5' 7.5\").    Weight as of this " encounter: 78.6 kg (173 lb 4.8 oz).

## 2023-09-01 NOTE — PATIENT INSTRUCTIONS
Dear Leonidas Pratt    Thank you for choosing [facility] ([facility]) for your infusion.  The following information is a summary of our appointment as well as important reminders.      EDUCATION POST BIOLOGICAL/CHEMOTHERAPY INFUSION  Call the triage nurse at your clinic or seek medical attention if you have chills and/or temperature greater than or equal to 100.5, uncontrolled nausea/vomiting, diarrhea, constipation, dizziness, shortness of breath, chest pain, heart palpitations, weakness or any other new or concerning symptoms, questions or concerns.  You can not have any live virus vaccines prior to or during treatment or up to 6 months post infusion.  If you have an upcoming surgery, medical procedure or dental procedure during treatment, this should be discussed with your ordering physician and your surgeon/dentist.  If you are having any concerning symptom, if you are unsure if you should get your next infusion or wish to speak to a provider before your next infusion, please call your care coordinator or triage nurse at your clinic to notify them so we can adequately serve you.     If you are a transplant patient and require transplant education, please click on this link: [email or web address].    We look forward in seeing you on your next appointment here at [facility] ([facility]).  Please don t hesitate to call us at [phone] to reschedule any of your appointments or to speak with one of the [facility] registered nurses.  It was a pleasure taking care of you today.    Sincerely,    [facility]  [facility]  [address] [place] [address]  [address]  Phone:  [phone]

## 2023-09-14 ENCOUNTER — LAB (OUTPATIENT)
Dept: LAB | Facility: CLINIC | Age: 58
End: 2023-09-14
Payer: COMMERCIAL

## 2023-09-14 DIAGNOSIS — M85.80 OSTEOPENIA, UNSPECIFIED LOCATION: ICD-10-CM

## 2023-09-14 LAB
FSH SERPL IRP2-ACNC: 3.9 MIU/ML (ref 1.5–12.4)
IRON BINDING CAPACITY (ROCHE): 246 UG/DL (ref 240–430)
IRON SATN MFR SERPL: 38 % (ref 15–46)
IRON SERPL-MCNC: 94 UG/DL (ref 61–157)
LH SERPL-ACNC: 2.3 MIU/ML (ref 1.7–8.6)
PROLACTIN SERPL 3RD IS-MCNC: 6 NG/ML (ref 4–15)
SHBG SERPL-SCNC: 32 NMOL/L (ref 11–80)

## 2023-09-14 PROCEDURE — 83550 IRON BINDING TEST: CPT | Performed by: PATHOLOGY

## 2023-09-14 PROCEDURE — 84146 ASSAY OF PROLACTIN: CPT | Performed by: STUDENT IN AN ORGANIZED HEALTH CARE EDUCATION/TRAINING PROGRAM

## 2023-09-14 PROCEDURE — 84270 ASSAY OF SEX HORMONE GLOBUL: CPT | Performed by: STUDENT IN AN ORGANIZED HEALTH CARE EDUCATION/TRAINING PROGRAM

## 2023-09-14 PROCEDURE — 83540 ASSAY OF IRON: CPT | Performed by: PATHOLOGY

## 2023-09-14 PROCEDURE — 83001 ASSAY OF GONADOTROPIN (FSH): CPT | Performed by: STUDENT IN AN ORGANIZED HEALTH CARE EDUCATION/TRAINING PROGRAM

## 2023-09-14 PROCEDURE — 84403 ASSAY OF TOTAL TESTOSTERONE: CPT | Performed by: STUDENT IN AN ORGANIZED HEALTH CARE EDUCATION/TRAINING PROGRAM

## 2023-09-14 PROCEDURE — 83002 ASSAY OF GONADOTROPIN (LH): CPT | Performed by: STUDENT IN AN ORGANIZED HEALTH CARE EDUCATION/TRAINING PROGRAM

## 2023-09-14 PROCEDURE — 99000 SPECIMEN HANDLING OFFICE-LAB: CPT | Performed by: PATHOLOGY

## 2023-09-14 PROCEDURE — 36415 COLL VENOUS BLD VENIPUNCTURE: CPT | Performed by: PATHOLOGY

## 2023-09-17 LAB
TESTOST FREE SERPL-MCNC: 7.02 NG/DL
TESTOST SERPL-MCNC: 345 NG/DL (ref 240–950)

## 2023-09-20 ENCOUNTER — OFFICE VISIT (OUTPATIENT)
Dept: NEUROLOGY | Facility: CLINIC | Age: 58
End: 2023-09-20
Payer: COMMERCIAL

## 2023-09-20 VITALS
HEIGHT: 69 IN | BODY MASS INDEX: 25.18 KG/M2 | WEIGHT: 170 LBS | DIASTOLIC BLOOD PRESSURE: 89 MMHG | SYSTOLIC BLOOD PRESSURE: 134 MMHG | HEART RATE: 70 BPM | OXYGEN SATURATION: 97 %

## 2023-09-20 DIAGNOSIS — G70.00 MYASTHENIA GRAVIS WITHOUT EXACERBATION (H): Primary | ICD-10-CM

## 2023-09-20 PROCEDURE — 99214 OFFICE O/P EST MOD 30 MIN: CPT | Performed by: PSYCHIATRY & NEUROLOGY

## 2023-09-20 ASSESSMENT — PAIN SCALES - GENERAL: PAINLEVEL: NO PAIN (0)

## 2023-09-20 NOTE — PROGRESS NOTES
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 Onset was around 2010. First symptom was diplopia. About 3 years later ptosis started. No dysarthria, dysphagia, SOB, or weakness. In 2015 he was found to have ACHR ab. In 2015 he was diagnosed with MG and started on prednisone. He started prednisone 60 mg and tapered off over about 3 months. He was off immunotherapy in 2016, and remained off until 12/2018. At that end of 2018 he developed diplopia and restarted prednisone 60 mg daily. He tapered over a few months. By 2/20 he was taking prednisone 5 mg daily. This time prednisone was helpful to alleviate ptosis and diplopia. He remained on low dose prednisone (around 5 mg) through most of 2019. He was stable during that time. In April 2020 he then developed worsening ptosis and diplopia of both eyes. In 4/20 he increased prednisone to 40 mg daily. By June 2020 he was back down to 5 mg. In 9/20 he then experienced new weakness in his left hand. Typing was difficult. He also felt that his legs were weaker. There was a clear drop off in his typical level of activity and exercise. No dysarthria or dysphagia. In 10/20 prednisone increased to 40 mg daily. Prednisone tapered to 10mg daily in 01/21, and 5 mg by 3/21. He underwent thymectomy on 3/22/21. Leading into thymectomy he received IVIG (2g/kg over 3 days from 3/15-17/2021). It was helpful but poorly tolerated (headache, nausea, malaise). In late 2021 and early 2022 he made several attempts to reduce prednisone below 5. By June 2022 he was able to wean off prednisone but in 1/23 he relapsed. In March 2023 he started vyvgart (series completed 3/28/23). He received three cycles. Vygart was partially helpful, but he was never able to get ADL above 5. In 8/23 he started ultomiris.    Interval history:   I last saw him in clinic on 7/19/23.Since then he stopped vyvgart and  started ultomiris. His first dose was 23. Since then he has noticed clear and marked improvement. His eyelid ptosis was almost completely resolved. Mild ptosis occurs at the end of the day. He has diplopia with down vision, but no diplopia most of the time. He can now smile and play trombone again. He could not do this on vyvgart. Limb strength is much improved, but not quite at 100%. Cameron and prolonged walking is still challenging. Presently is on prednisone 5 mg.     Prior pertinent laboratory work-up:  : Normal/negative Hba1c, TSH  4/15: ACHR ab binding 3.1 (N<0.4)    Prior electrophysiologic work-up:  12/10: RNS of the right facial nerves showed no abnormal decreament or increment.Single fiber EMG of the right frontalis muscle was subjectively well within the normal range (normal <1.69).  All individual pairs demonstrated normal jitter ranging from 13 ms to 47.2 ms with normal <53.5 ms in the frontalis.  The mean jitter of the 20 pairs in the right frontalis muscle was 22.9 ms (nl< 35.5ms). There was no transmission blocking. There is no electrophysiological evidence for neuromuscular junction disorder.  10/14/20: NCS/RNS showed unequivocal decrement in facial-nasalis and ulnar-ADM.     Prior imagin/9/17 CT C/A/P showed no mediastinal mass  10/29/20: CT chest showed unchanged tiny soft tissue density in the anterior superior mediastinum, presumably residual thymus. No enlargement to suggest Thymoma.    Past Medical History:   Past Medical History:   Diagnosis Date    Asthma     Kidney stone     Myasthenia gravis (H)     Strabismus     Thyroid disease      Past Surgical History:  Past Surgical History:   Procedure Laterality Date    COLONOSCOPY N/A 2023    Procedure: COLONOSCOPY;  Surgeon: Trevor Perez MD;  Location: [facility] OR    HERNIA REPAIR      LASER HOLMIUM LITHOTRIPSY URETER(S), INSERT STENT, COMBINED Right 10/15/2014    Procedure: COMBINED CYSTOSCOPY, URETEROSCOPY, LASER HOLMIUM  "LITHOTRIPSY URETER(S), INSERT STENT;  Surgeon: Thong Bates MD;  Location: [facility] OR    RECESSION RESECTION WITH ADJUSTABLE SUTURE  9/4/2012    LLRc 5.0mm on adj. LIRc 4.5mm on adj.    RECESSION RESECTION WITH ADJUSTABLE SUTURE BILATERAL  12/18/2012    RSRc 3.0mm; adj. suture    STRABISMUS SURGERY      THORACOSCOPIC THYMECTOMY N/A 3/22/2021    Procedure: SUBXIPHOID BILATERAL THORACOSCOPIC THYMECTOMY;  Surgeon: Benjy Hull MD;  Location: [facility] OR     Family history:    There is no known family history of myopathy or other neuromuscular disorders. He does have a sister with spasmodic dysphonia.     Social History:    He denies tobacco, alcohol, or illicit drug use.     Medical Allergies:    Allergies   Allergen Reactions    Nkda [No Known Drug Allergy]      Current Medications:    Current Outpatient Medications   Medication    albuterol (PROAIR HFA/PROVENTIL HFA/VENTOLIN HFA) 108 (90 Base) MCG/ACT inhaler    levothyroxine (SYNTHROID/LEVOTHROID) 125 MCG tablet    levothyroxine (SYNTHROID/LEVOTHROID) 137 MCG tablet    predniSONE (DELTASONE) 5 MG tablet    pyridostigmine (MESTINON) 60 MG tablet    Vitamin D, Cholecalciferol, 25 MCG (1000 UT) TABS    apraclonidine (IOPIDINE) 0.5 % ophthalmic solution    bisacodyl (DULCOLAX) 5 MG EC tablet    levothyroxine (SYNTHROID/LEVOTHROID) 150 MCG tablet    polyethylene glycol (GOLYTELY) 236 g suspension    predniSONE (DELTASONE) 1 MG tablet    predniSONE (DELTASONE) 10 MG tablet    predniSONE (DELTASONE) 5 MG tablet    predniSONE (DELTASONE) 5 MG tablet    pyridostigmine (MESTINON) 60 MG tablet     No current facility-administered medications for this visit.     Review of Systems: A review of systems was obtained and was negative except for what was noted above.    Physical examination:    /89 (BP Location: Right arm, Patient Position: Right side, Cuff Size: Adult Regular)   Pulse 70   Ht 1.753 m (5' 9\")   Wt 77.1 kg (170 lb)   SpO2 97%   BMI 25.10 kg/m  "     General Appearance: NAD     Skin: There are no rashes or other skin lesions.     Neurologic examination:     Mental status:  Patient is alert, attentive, and oriented x 3.  Language is coherent and fluent without aphasia.  Memory, comprehension and ability to follow commands were intact.        Cranial nerves:  No ptosis at baseline and only mild left ptosis after 15 seconds upgaze. Mild left eye closure weakness.  Diplopia with lateral gaze but not mid position. Symmetric smile and full. No dysarthria.      Motor exam: No atrophy or fasciculations. Manual muscle testing revealed the following MRC grade muscle power:    Right Left   Neck flexion 5 5   Neck extension: 5 5   Shoulder ext rotation 5 5   Shoulder abduction:        5 5   Elbow extension: 5 5   Elbow flexion:            5 5   Wrist flexion:            5 5   Wrist extension:        5 5   Finger flexion 5 5   Finger extension 5 5   FDI 5 5   Hip flexion 5 5   Knee flexion 5 5   Knee extension 5 5   Dorsiflexion 5 5   Plantar flexion 5 5   Red indicates worse when compared to last examination  Green indicates improved when compared to last examination     MG Outcomes MG-ADL MG-Composite  strength (kg) MG medications   10/12/20 7 9 Right 35, Left 31 Pred 5 mg daily   11/18/20 5 6 Right 40, Left 39 Pred 25 mg daily   1/20/21 4 2 Right 41, Left 41 Prednisone 10mg daily   3/22/21 xxx xxx xxx Thymectomy   3/31/21   5 9 Right 44, Left 46 IVIG prior to thymectomy 2g/kg over 3 days (03/15-17/2021),  Prednisone 5mg daily   7/14/21 3 4 Right 43, Left 44 Pred 5 mg   3/14/22 3 Not tested (video) Not tested (video) Pred 5 mg   6/22 xxx xxx xxx Weaned off prednisone   2/8/23 7 8 Right 32 kg, left 36 Kg None (pred 20 mg started just 2 days ago)   5/3/23 5 6 Right 32 kg, left 35 kg Vyvgart series (4 weekly infusions) completed 3/28/23   7/19/23 5 6 Right 36 kg, left 33 kg Vyvgart series (currently in between infusion 2 and 3 on 3rd vyvgart cycle), pred 5 mg daily    9/20/23 3 3 Right 38 kg, Left 37 kg Ultomiris started 8/18/23, Pred 5 mg     Assessment:    Leonidas Pratt is a 58 year old man with ACHR ab positive generalized non-thymomatous myasthenia gravis s/p thymectomy in 3/2021. Since transitioning from vyvgart to ultomiris has has markedly improved.  He is not quite in minimal manifestations, but close. Will proceed with ultomiris as below.       Plan:      1. Immunotherapy:   - Prednisone: Prednisone 5 mg daily. After next ultomiris reduce to 5/2.5 x 2 weeks, then 2.5 x 2 weeks then 2.5/0 x 2 weeks then stop  - Vyvgart: Three cycles between 2/23 and 7/23 were modestly beneficial. No furtehr vyvgart anticipated  - Ultomiris: Conitnue 2700 mg  q 2 months  2. Thymectomy: Completed 3/22/21. Benefits from thymectomy may take 1-3 years to be fully appreciated. If disease continues to worsen or is not controlled with #1 will repeat CT chest to look for residual thymus tissue  3. Supportive care:  - Mestinon: Continue 60 mg BID to TID as needed  4. Collateral care:   - Needs completion of meningococcal vaccine cycle  5. Potential MG triggers including heat, surgery, and illness. Certain medications and over the counter preparations may also cause worsening of MG symptoms. A list of cautionary medications can be found at: [email or web address]  6. Follow up 4 months. Sooner if needed.    -----

## 2023-09-20 NOTE — LETTER
9/20/2023       RE: Leonidas Pratt  [address]  [place] [address]       Dear Colleague,    Thank you for referring your patient, Leonidas Pratt, to the [facility] NEUROLOGY CLINIC [place] at [facility]. Please see a copy of my visit note below.    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 Onset was around 2010. First symptom was diplopia. About 3 years later ptosis started. No dysarthria, dysphagia, SOB, or weakness. In 2015 he was found to have ACHR ab. In 2015 he was diagnosed with MG and started on prednisone. He started prednisone 60 mg and tapered off over about 3 months. He was off immunotherapy in 2016, and remained off until 12/2018. At that end of 2018 he developed diplopia and restarted prednisone 60 mg daily. He tapered over a few months. By 2/20 he was taking prednisone 5 mg daily. This time prednisone was helpful to alleviate ptosis and diplopia. He remained on low dose prednisone (around 5 mg) through most of 2019. He was stable during that time. In April 2020 he then developed worsening ptosis and diplopia of both eyes. In 4/20 he increased prednisone to 40 mg daily. By June 2020 he was back down to 5 mg. In 9/20 he then experienced new weakness in his left hand. Typing was difficult. He also felt that his legs were weaker. There was a clear drop off in his typical level of activity and exercise. No dysarthria or dysphagia. In 10/20 prednisone increased to 40 mg daily. Prednisone tapered to 10mg daily in 01/21, and 5 mg by 3/21. He underwent thymectomy on 3/22/21. Leading into thymectomy he received IVIG (2g/kg over 3 days from 3/15-17/2021). It was helpful but poorly tolerated (headache, nausea, malaise). In late 2021 and early 2022 he made several attempts to reduce prednisone below 5. By June 2022 he was able to  wean off prednisone but in  he relapsed. In 2023 he started vyvgart (series completed 3/28/23). He received three cycles. Vygart was partially helpful, but he was never able to get ADL above 5. In  he started ultomiris.    Interval history:   I last saw him in clinic on 23.Since then he stopped vyvgart and started ultomiris. His first dose was 23. Since then he has noticed clear and marked improvement. His eyelid ptosis was almost completely resolved. Mild ptosis occurs at the end of the day. He has diplopia with down vision, but no diplopia most of the time. He can now smile and play trombone again. He could not do this on vyvgart. Limb strength is much improved, but not quite at 100%. Picher and prolonged walking is still challenging. Presently is on prednisone 5 mg.     Prior pertinent laboratory work-up:  : Normal/negative Hba1c, TSH  4/15: ACHR ab binding 3.1 (N<0.4)    Prior electrophysiologic work-up:  12/10: RNS of the right facial nerves showed no abnormal decreament or increment.Single fiber EMG of the right frontalis muscle was subjectively well within the normal range (normal <1.69).  All individual pairs demonstrated normal jitter ranging from 13 ms to 47.2 ms with normal <53.5 ms in the frontalis.  The mean jitter of the 20 pairs in the right frontalis muscle was 22.9 ms (nl< 35.5ms). There was no transmission blocking. There is no electrophysiological evidence for neuromuscular junction disorder.  10/14/20: NCS/RNS showed unequivocal decrement in facial-nasalis and ulnar-ADM.     Prior imagin/9/17 CT C/A/P showed no mediastinal mass  10/29/20: CT chest showed unchanged tiny soft tissue density in the anterior superior mediastinum, presumably residual thymus. No enlargement to suggest Thymoma.    Past Medical History:   Past Medical History:   Diagnosis Date    Asthma     Kidney stone     Myasthenia gravis (H)     Strabismus     Thyroid disease      Past Surgical  History:  Past Surgical History:   Procedure Laterality Date    COLONOSCOPY N/A 1/13/2023    Procedure: COLONOSCOPY;  Surgeon: Trevor Perez MD;  Location: [facility] OR    HERNIA REPAIR      LASER HOLMIUM LITHOTRIPSY URETER(S), INSERT STENT, COMBINED Right 10/15/2014    Procedure: COMBINED CYSTOSCOPY, URETEROSCOPY, LASER HOLMIUM LITHOTRIPSY URETER(S), INSERT STENT;  Surgeon: Thong Bates MD;  Location: [facility] OR    RECESSION RESECTION WITH ADJUSTABLE SUTURE  9/4/2012    LLRc 5.0mm on adj. LIRc 4.5mm on adj.    RECESSION RESECTION WITH ADJUSTABLE SUTURE BILATERAL  12/18/2012    RSRc 3.0mm; adj. suture    STRABISMUS SURGERY      THORACOSCOPIC THYMECTOMY N/A 3/22/2021    Procedure: SUBXIPHOID BILATERAL THORACOSCOPIC THYMECTOMY;  Surgeon: Benjy Hull MD;  Location: [facility] OR     Family history:    There is no known family history of myopathy or other neuromuscular disorders. He does have a sister with spasmodic dysphonia.     Social History:    He denies tobacco, alcohol, or illicit drug use.     Medical Allergies:    Allergies   Allergen Reactions    Nkda [No Known Drug Allergy]      Current Medications:    Current Outpatient Medications   Medication    albuterol (PROAIR HFA/PROVENTIL HFA/VENTOLIN HFA) 108 (90 Base) MCG/ACT inhaler    levothyroxine (SYNTHROID/LEVOTHROID) 125 MCG tablet    levothyroxine (SYNTHROID/LEVOTHROID) 137 MCG tablet    predniSONE (DELTASONE) 5 MG tablet    pyridostigmine (MESTINON) 60 MG tablet    Vitamin D, Cholecalciferol, 25 MCG (1000 UT) TABS    apraclonidine (IOPIDINE) 0.5 % ophthalmic solution    bisacodyl (DULCOLAX) 5 MG EC tablet    levothyroxine (SYNTHROID/LEVOTHROID) 150 MCG tablet    polyethylene glycol (GOLYTELY) 236 g suspension    predniSONE (DELTASONE) 1 MG tablet    predniSONE (DELTASONE) 10 MG tablet    predniSONE (DELTASONE) 5 MG tablet    predniSONE (DELTASONE) 5 MG tablet    pyridostigmine (MESTINON) 60 MG tablet     No current facility-administered  "medications for this visit.     Review of Systems: A review of systems was obtained and was negative except for what was noted above.    Physical examination:    /89 (BP Location: Right arm, Patient Position: Right side, Cuff Size: Adult Regular)   Pulse 70   Ht 1.753 m (5' 9\")   Wt 77.1 kg (170 lb)   SpO2 97%   BMI 25.10 kg/m      General Appearance: NAD     Skin: There are no rashes or other skin lesions.     Neurologic examination:     Mental status:  Patient is alert, attentive, and oriented x 3.  Language is coherent and fluent without aphasia.  Memory, comprehension and ability to follow commands were intact.        Cranial nerves:  No ptosis at baseline and only mild left ptosis after 15 seconds upgaze. Mild left eye closure weakness.  Diplopia with lateral gaze but not mid position. Symmetric smile and full. No dysarthria.      Motor exam: No atrophy or fasciculations. Manual muscle testing revealed the following MRC grade muscle power:    Right Left   Neck flexion 5 5   Neck extension: 5 5   Shoulder ext rotation 5 5   Shoulder abduction:        5 5   Elbow extension: 5 5   Elbow flexion:            5 5   Wrist flexion:            5 5   Wrist extension:        5 5   Finger flexion 5 5   Finger extension 5 5   FDI 5 5   Hip flexion 5 5   Knee flexion 5 5   Knee extension 5 5   Dorsiflexion 5 5   Plantar flexion 5 5   Red indicates worse when compared to last examination  Green indicates improved when compared to last examination     MG Outcomes MG-ADL MG-Composite  strength (kg) MG medications   10/12/20 7 9 Right 35, Left 31 Pred 5 mg daily   11/18/20 5 6 Right 40, Left 39 Pred 25 mg daily   1/20/21 4 2 Right 41, Left 41 Prednisone 10mg daily   3/22/21 xxx xxx xxx Thymectomy   3/31/21   5 9 Right 44, Left 46 IVIG prior to thymectomy 2g/kg over 3 days (03/15-17/2021),  Prednisone 5mg daily   7/14/21 3 4 Right 43, Left 44 Pred 5 mg   3/14/22 3 Not tested (video) Not tested (video) Pred 5 mg "   6/22 xxx xxx xxx Weaned off prednisone   2/8/23 7 8 Right 32 kg, left 36 Kg None (pred 20 mg started just 2 days ago)   5/3/23 5 6 Right 32 kg, left 35 kg Vyvgart series (4 weekly infusions) completed 3/28/23   7/19/23 5 6 Right 36 kg, left 33 kg Vyvgart series (currently in between infusion 2 and 3 on 3rd vyvgart cycle), pred 5 mg daily   9/20/23 3 3 Right 38 kg, Left 37 kg Ultomiris started 8/18/23, Pred 5 mg     Assessment:    Leonidas Pratt is a 58 year old man with ACHR ab positive generalized non-thymomatous myasthenia gravis s/p thymectomy in 3/2021. Since transitioning from vyvgart to ultomiris has has markedly improved.  He is not quite in minimal manifestations, but close. Will proceed with ultomiris as below.       Plan:      1. Immunotherapy:   - Prednisone: Prednisone 5 mg daily. After next ultomiris reduce to 5/2.5 x 2 weeks, then 2.5 x 2 weeks then 2.5/0 x 2 weeks then stop  - Vyvgart: Three cycles between 2/23 and 7/23 were modestly beneficial. No furtehr vyvgart anticipated  - Ultomiris: Conitnue 2700 mg  q 2 months  2. Thymectomy: Completed 3/22/21. Benefits from thymectomy may take 1-3 years to be fully appreciated. If disease continues to worsen or is not controlled with #1 will repeat CT chest to look for residual thymus tissue  3. Supportive care:  - Mestinon: Continue 60 mg BID to TID as needed  4. Collateral care:   - Needs completion of meningococcal vaccine cycle  5. Potential MG triggers including heat, surgery, and illness. Certain medications and over the counter preparations may also cause worsening of MG symptoms. A list of cautionary medications can be found at: [email or web address]-[email or web address]-[email or web address]-Management/[email or web address]-Drugs  6. Follow up 4 months. Sooner if needed.    -----          Again, thank you for allowing me to participate in the care of your patient.      Sincerely,    Rj Jackson MD

## 2023-10-04 ENCOUNTER — CARE COORDINATION (OUTPATIENT)
Dept: PHARMACY | Facility: CLINIC | Age: 58
End: 2023-10-04
Payer: COMMERCIAL

## 2023-10-04 NOTE — PROGRESS NOTES
Referred to [facility]    Leonidas KYLE Pratt, 1965  Medication (name, frequency and route):  Ultomiris [id number]   Start of Care Date: Friday 10/27/23 (Confirmed with patient)  Infusion location: \[facility]\"" Ambulatory Infusion Site  Skilled Nursing will be provided by: [facility]  @ [phone]    Julee Samson RN

## 2023-10-27 ENCOUNTER — DOCUMENTATION ONLY (OUTPATIENT)
Dept: PHARMACY | Facility: CLINIC | Age: 58
End: 2023-10-27
Payer: COMMERCIAL

## 2023-10-27 NOTE — PROGRESS NOTES
Skilled Nurse visit in the [facility] Ambulatory Infusion Site to administer [id number]/66ml .  No recent elevated temperature, fever, chills, productive cough, coughing for 3 weeks or longer or hemoptysis, abnormal vital signs, night sweats, chest pain. No  decrease in your appetite, unexplained weight loss or fatigue.  No other new onset medical symptoms.  Current weight 178lb.  Peripheral IVleft Lower Forearm, 1attempt Pre medicated with none. Labs drawn none. Infusion completed without complication or reaction. Pt reports therapy is effective in managing symptoms related to therapy.   Melina Taylor RN  [facility]  [email or web address]  [phone]

## 2023-11-22 DIAGNOSIS — G70.00 MYASTHENIA GRAVIS WITHOUT EXACERBATION (H): ICD-10-CM

## 2023-11-22 RX ORDER — PYRIDOSTIGMINE BROMIDE 60 MG/1
TABLET ORAL
Qty: 90 TABLET | Refills: 1 | Status: SHIPPED | OUTPATIENT
Start: 2023-11-22 | End: 2024-03-11

## 2023-12-22 ENCOUNTER — DOCUMENTATION ONLY (OUTPATIENT)
Dept: PHARMACY | Facility: CLINIC | Age: 58
End: 2023-12-22
Payer: COMMERCIAL

## 2023-12-22 NOTE — PROGRESS NOTES
Skilled Nurse visit in the [facility] Ambulatory Infusion Site to administer Ultomiris 3300mg.  No recent elevated temperature, fever, chills, productive cough, coughing for 3 weeks or longer or hemoptysis, abnormal vital signs, night sweats, chest pain. No  decrease in your appetite, unexplained weight loss or fatigue.  No other new onset medical symptoms.  Current weight 183.4 lbs.  Peripheral IVleft Wrist, 3attempts . Infusion completed without complication or reaction. Pt reports therapy iseffective in managing symptoms related to therapy.     Carri Salas RN, BSN  [facility]  [phone]  [email or web address]

## 2024-01-04 ENCOUNTER — TELEPHONE (OUTPATIENT)
Dept: NEUROLOGY | Facility: CLINIC | Age: 59
End: 2024-01-04
Payer: COMMERCIAL

## 2024-01-04 NOTE — TELEPHONE ENCOUNTER
----- Message from Denise Shah sent at 12/29/2023 11:54 AM CST -----  Regarding: ULTOMIRIS - remove  Hi Kasandra,    Please remove the infusion appointment request line from the Ultomiris therapy plan per the 10/4 Care Coordination stating he is going through [facility]    Thank you,  Denise VICKERS [facility] - 2nd Floor  [facility] Scheduling  [phone] (option 3, then option 2)

## 2024-02-07 ENCOUNTER — OFFICE VISIT (OUTPATIENT)
Dept: NEUROLOGY | Facility: CLINIC | Age: 59
End: 2024-02-07
Payer: COMMERCIAL

## 2024-02-07 VITALS
SYSTOLIC BLOOD PRESSURE: 133 MMHG | HEIGHT: 69 IN | BODY MASS INDEX: 26.81 KG/M2 | OXYGEN SATURATION: 98 % | HEART RATE: 61 BPM | WEIGHT: 181 LBS | DIASTOLIC BLOOD PRESSURE: 88 MMHG

## 2024-02-07 DIAGNOSIS — G70.00 MYASTHENIA GRAVIS WITHOUT EXACERBATION (H): Primary | ICD-10-CM

## 2024-02-07 PROCEDURE — 99214 OFFICE O/P EST MOD 30 MIN: CPT | Performed by: PSYCHIATRY & NEUROLOGY

## 2024-02-07 ASSESSMENT — PAIN SCALES - GENERAL: PAINLEVEL: NO PAIN (0)

## 2024-02-07 NOTE — LETTER
2/7/2024       RE: Leonidas Pratt  [address]  [place] [address]       Dear Colleague,    Thank you for referring your patient, Leonidas Pratt, to the [facility] NEUROLOGY CLINIC [place] at [facility]. Please see a copy of my visit note below.    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 Onset was around 2010. First symptom was diplopia. About 3 years later ptosis started. No dysarthria, dysphagia, SOB, or weakness. In 2015 he was found to have ACHR ab. In 2015 he was diagnosed with MG and started on prednisone. He started prednisone 60 mg and tapered off over about 3 months. He was off immunotherapy in 2016, and remained off until 12/2018. At that end of 2018 he developed diplopia and restarted prednisone 60 mg daily. He tapered over a few months. By 2/20 he was taking prednisone 5 mg daily. This time prednisone was helpful to alleviate ptosis and diplopia. He remained on low dose prednisone (around 5 mg) through most of 2019. He was stable during that time. In April 2020 he then developed worsening ptosis and diplopia of both eyes. In 4/20 he increased prednisone to 40 mg daily. By June 2020 he was back down to 5 mg. In 9/20 he then experienced new weakness in his left hand. Typing was difficult. He also felt that his legs were weaker. There was a clear drop off in his typical level of activity and exercise. No dysarthria or dysphagia. In 10/20 prednisone increased to 40 mg daily. Prednisone tapered to 10mg daily in 01/21, and 5 mg by 3/21. He underwent thymectomy on 3/22/21. Leading into thymectomy he received IVIG (2g/kg over 3 days from 3/15-17/2021). It was helpful but poorly tolerated (headache, nausea, malaise). In late 2021 and early 2022 he made several attempts to reduce prednisone below 5. By June 2022 he was able to  wean off prednisone but in  he relapsed. In 2023 he started vyvgart (series completed 3/28/23). He received three cycles. Vygart was partially helpful, but he was never able to get ADL above 5. In  he started ultomiris. In  he tapered off prednisone.     Interval history:   I last saw him in clinic on 23. He has now been on ultomiris for about 6 months. His infusions are every 2 months. Overall he is doing well. Ptosis and diplopia are not resolved, but better than before. He now mainly has these symptoms in the evening, and they are less functionally impactful as before. He can smile and play trombone again. No dysarthria or dysphagia. He is exercising again. He also has been able to taper off prednisone.     Prior pertinent laboratory work-up:  : Normal/negative Hba1c, TSH  4/15: ACHR ab binding 3.1 (N<0.4)    Prior electrophysiologic work-up:  12/10: RNS of the right facial nerves showed no abnormal decreament or increment.Single fiber EMG of the right frontalis muscle was subjectively well within the normal range (normal <1.69).  All individual pairs demonstrated normal jitter ranging from 13 ms to 47.2 ms with normal <53.5 ms in the frontalis.  The mean jitter of the 20 pairs in the right frontalis muscle was 22.9 ms (nl< 35.5ms). There was no transmission blocking. There is no electrophysiological evidence for neuromuscular junction disorder.  10/14/20: NCS/RNS showed unequivocal decrement in facial-nasalis and ulnar-ADM.     Prior imagin/9/17 CT C/A/P showed no mediastinal mass  10/29/20: CT chest showed unchanged tiny soft tissue density in the anterior superior mediastinum, presumably residual thymus. No enlargement to suggest Thymoma.    Past Medical History:   Past Medical History:   Diagnosis Date    Asthma     Kidney stone     Myasthenia gravis (H)     Strabismus     Thyroid disease      Past Surgical History:  Past Surgical History:   Procedure Laterality Date     COLONOSCOPY N/A 1/13/2023    Procedure: COLONOSCOPY;  Surgeon: Trevor Perez MD;  Location: [facility] OR    HERNIA REPAIR      LASER HOLMIUM LITHOTRIPSY URETER(S), INSERT STENT, COMBINED Right 10/15/2014    Procedure: COMBINED CYSTOSCOPY, URETEROSCOPY, LASER HOLMIUM LITHOTRIPSY URETER(S), INSERT STENT;  Surgeon: Thong Bates MD;  Location: [facility] OR    RECESSION RESECTION WITH ADJUSTABLE SUTURE  9/4/2012    LLRc 5.0mm on adj. LIRc 4.5mm on adj.    RECESSION RESECTION WITH ADJUSTABLE SUTURE BILATERAL  12/18/2012    RSRc 3.0mm; adj. suture    STRABISMUS SURGERY      THORACOSCOPIC THYMECTOMY N/A 3/22/2021    Procedure: SUBXIPHOID BILATERAL THORACOSCOPIC THYMECTOMY;  Surgeon: Benjy Hull MD;  Location: [facility] OR     Family history:    There is no known family history of myopathy or other neuromuscular disorders. He does have a sister with spasmodic dysphonia.     Social History:    He denies tobacco, alcohol, or illicit drug use.     Medical Allergies:    Allergies   Allergen Reactions    Nkda [No Known Drug Allergy]      Current Medications:    Current Outpatient Medications   Medication    albuterol (PROAIR HFA/PROVENTIL HFA/VENTOLIN HFA) 108 (90 Base) MCG/ACT inhaler    levothyroxine (SYNTHROID/LEVOTHROID) 125 MCG tablet    pyRIDostigmine (MESTINON) 60 MG tablet    Vitamin D, Cholecalciferol, 25 MCG (1000 UT) TABS    apraclonidine (IOPIDINE) 0.5 % ophthalmic solution    bisacodyl (DULCOLAX) 5 MG EC tablet    levothyroxine (SYNTHROID/LEVOTHROID) 137 MCG tablet    levothyroxine (SYNTHROID/LEVOTHROID) 150 MCG tablet    polyethylene glycol (GOLYTELY) 236 g suspension    predniSONE (DELTASONE) 1 MG tablet    predniSONE (DELTASONE) 10 MG tablet    predniSONE (DELTASONE) 5 MG tablet    predniSONE (DELTASONE) 5 MG tablet    predniSONE (DELTASONE) 5 MG tablet    pyridostigmine (MESTINON) 60 MG tablet     No current facility-administered medications for this visit.     Review of Systems: A review of systems was  "obtained and was negative except for what was noted above.    Physical examination:    /88 (BP Location: Right arm, Patient Position: Sitting, Cuff Size: Adult Regular)   Pulse 61   Ht 1.753 m (5' 9.02\")   Wt 82.1 kg (181 lb)   SpO2 98%   BMI 26.72 kg/m      General Appearance: NAD     Skin: There are no rashes or other skin lesions.     Neurologic examination:     Mental status:  Patient is alert, attentive, and oriented x 3.  Language is coherent and fluent without aphasia.  Memory, comprehension and ability to follow commands were intact.        Cranial nerves:  No ptosis at baseline and only mild left ptosis after 15 seconds upgaze. Mild left eye closure weakness.  Diplopia with lateral gaze but not mid position. Symmetric smile and full. No dysarthria.      Motor exam: No atrophy or fasciculations. Manual muscle testing revealed the following MRC grade muscle power:    Right Left   Neck flexion 5 5   Neck extension: 5 5   Shoulder ext rotation 5 5   Shoulder abduction:        5 5   Elbow extension: 5 5   Elbow flexion:            5 5   Wrist flexion:            5 5   Wrist extension:        5 5   Finger flexion 5 5   Finger extension 5 5   FDI 5 5   Hip flexion 5 5   Knee flexion 5 5   Knee extension 5 5   Dorsiflexion 5 5   Plantar flexion 5 5   Red indicates worse when compared to last examination  Green indicates improved when compared to last examination     MG Outcomes MG-ADL MG-Composite  strength (kg) MG medications   10/12/20 7 9 Right 35, Left 31 Pred 5 mg daily   11/18/20 5 6 Right 40, Left 39 Pred 25 mg daily   1/20/21 4 2 Right 41, Left 41 Prednisone 10mg daily   3/22/21 xxx xxx xxx Thymectomy   3/31/21   5 9 Right 44, Left 46 IVIG prior to thymectomy 2g/kg over 3 days (03/15-17/2021),  Prednisone 5mg daily   7/14/21 3 4 Right 43, Left 44 Pred 5 mg   3/14/22 3 Not tested (video) Not tested (video) Pred 5 mg   6/22 xxx xxx xxx Weaned off prednisone   2/8/23 7 8 Right 32 kg, left 36 Kg " None (pred 20 mg started just 2 days ago)   5/3/23 5 6 Right 32 kg, left 35 kg Vyvgart series (4 weekly infusions) completed 3/28/23   7/19/23 5 6 Right 36 kg, left 33 kg Vyvgart series (currently in between infusion 2 and 3 on 3rd vyvgart cycle), pred 5 mg daily   9/20/23 3 3 Right 38 kg, Left 37 kg Ultomiris started 8/18/23, Pred 5 mg   2/7/24 3 3 Right 38 kg, left 38 kg Ultomiris started 8/18/23, receiving q 2 months     Assessment:    Leonidas Pratt is a 59 year old man with ACHR ab positive generalized non-thymomatous myasthenia gravis s/p thymectomy in 3/2021. Since transitioning from vyvgart to ultomiris has has markedly improved.  Manifestations are more than minimal, but overall mild. Will proceed with ultomiris as below.     Plan:      1. Immunotherapy:   - Prednisone: Tapered off 9/2023  - Vyvgart: Suboptimally beneficial after 3 cycles between 2/23 and 7/23. No further vyvgart anticipated  - Ultomiris: Started 8/23. Continue 2700 mg  q 2 months  2. Thymectomy: Completed 3/22/21. Benefits from thymectomy may take 1-3 years to be fully appreciated. If disease continues to worsen or is not controlled with #1 will repeat CT chest to look for residual thymus tissue  3. Supportive care:  - Mestinon: Continue 60 mg BID to TID as needed  4. Potential MG triggers including heat, surgery, and illness. Certain medications and over the counter preparations may also cause worsening of MG symptoms. A list of cautionary medications can be found at: [email or web address]-is-MG/MG-Management/Cautionary-Drugs  5. Disclosures discussed and in AVS  6. Follow up 6 months. Sooner if needed.    -----            Again, thank you for allowing me to participate in the care of your patient.      Sincerely,    Rj Jackson MD

## 2024-02-07 NOTE — NURSING NOTE
Chief Complaint   Patient presents with    RECHECK    Myasthenia Gravis       Vitals were taken and medications were reconciled.    Rebeka Stone, Technician  10:47 AM  February 7, 2024

## 2024-02-07 NOTE — PROGRESS NOTES
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 Onset was around 2010. First symptom was diplopia. About 3 years later ptosis started. No dysarthria, dysphagia, SOB, or weakness. In 2015 he was found to have ACHR ab. In 2015 he was diagnosed with MG and started on prednisone. He started prednisone 60 mg and tapered off over about 3 months. He was off immunotherapy in 2016, and remained off until 12/2018. At that end of 2018 he developed diplopia and restarted prednisone 60 mg daily. He tapered over a few months. By 2/20 he was taking prednisone 5 mg daily. This time prednisone was helpful to alleviate ptosis and diplopia. He remained on low dose prednisone (around 5 mg) through most of 2019. He was stable during that time. In April 2020 he then developed worsening ptosis and diplopia of both eyes. In 4/20 he increased prednisone to 40 mg daily. By June 2020 he was back down to 5 mg. In 9/20 he then experienced new weakness in his left hand. Typing was difficult. He also felt that his legs were weaker. There was a clear drop off in his typical level of activity and exercise. No dysarthria or dysphagia. In 10/20 prednisone increased to 40 mg daily. Prednisone tapered to 10mg daily in 01/21, and 5 mg by 3/21. He underwent thymectomy on 3/22/21. Leading into thymectomy he received IVIG (2g/kg over 3 days from 3/15-17/2021). It was helpful but poorly tolerated (headache, nausea, malaise). In late 2021 and early 2022 he made several attempts to reduce prednisone below 5. By June 2022 he was able to wean off prednisone but in 1/23 he relapsed. In March 2023 he started vyvgart (series completed 3/28/23). He received three cycles. Vygart was partially helpful, but he was never able to get ADL above 5. In 8/23 he started ultomiris. In 9/23 he tapered off prednisone.     Interval history:   I last saw him in clinic on  23. He has now been on ultomiris for about 6 months. His infusions are every 2 months. Overall he is doing well. Ptosis and diplopia are not resolved, but better than before. He now mainly has these symptoms in the evening, and they are less functionally impactful as before. He can smile and play trombone again. No dysarthria or dysphagia. He is exercising again. He also has been able to taper off prednisone.     Prior pertinent laboratory work-up:  : Normal/negative Hba1c, TSH  4/15: ACHR ab binding 3.1 (N<0.4)    Prior electrophysiologic work-up:  12/10: RNS of the right facial nerves showed no abnormal decreament or increment.Single fiber EMG of the right frontalis muscle was subjectively well within the normal range (normal <1.69).  All individual pairs demonstrated normal jitter ranging from 13 ms to 47.2 ms with normal <53.5 ms in the frontalis.  The mean jitter of the 20 pairs in the right frontalis muscle was 22.9 ms (nl< 35.5ms). There was no transmission blocking. There is no electrophysiological evidence for neuromuscular junction disorder.  10/14/20: NCS/RNS showed unequivocal decrement in facial-nasalis and ulnar-ADM.     Prior imagin/9/17 CT C/A/P showed no mediastinal mass  10/29/20: CT chest showed unchanged tiny soft tissue density in the anterior superior mediastinum, presumably residual thymus. No enlargement to suggest Thymoma.    Past Medical History:   Past Medical History:   Diagnosis Date    Asthma     Kidney stone     Myasthenia gravis (H)     Strabismus     Thyroid disease      Past Surgical History:  Past Surgical History:   Procedure Laterality Date    COLONOSCOPY N/A 2023    Procedure: COLONOSCOPY;  Surgeon: Trevor Perez MD;  Location: [facility] OR    HERNIA REPAIR      LASER HOLMIUM LITHOTRIPSY URETER(S), INSERT STENT, COMBINED Right 10/15/2014    Procedure: COMBINED CYSTOSCOPY, URETEROSCOPY, LASER HOLMIUM LITHOTRIPSY URETER(S), INSERT STENT;  Surgeon: Jana  "Thong Fernandes MD;  Location: [facility] OR    RECESSION RESECTION WITH ADJUSTABLE SUTURE  9/4/2012    LLRc 5.0mm on adj. LIRc 4.5mm on adj.    RECESSION RESECTION WITH ADJUSTABLE SUTURE BILATERAL  12/18/2012    RSRc 3.0mm; adj. suture    STRABISMUS SURGERY      THORACOSCOPIC THYMECTOMY N/A 3/22/2021    Procedure: SUBXIPHOID BILATERAL THORACOSCOPIC THYMECTOMY;  Surgeon: Benjy Hull MD;  Location: [facility] OR     Family history:    There is no known family history of myopathy or other neuromuscular disorders. He does have a sister with spasmodic dysphonia.     Social History:    He denies tobacco, alcohol, or illicit drug use.     Medical Allergies:    Allergies   Allergen Reactions    Nkda [No Known Drug Allergy]      Current Medications:    Current Outpatient Medications   Medication    albuterol (PROAIR HFA/PROVENTIL HFA/VENTOLIN HFA) 108 (90 Base) MCG/ACT inhaler    levothyroxine (SYNTHROID/LEVOTHROID) 125 MCG tablet    pyRIDostigmine (MESTINON) 60 MG tablet    Vitamin D, Cholecalciferol, 25 MCG (1000 UT) TABS    apraclonidine (IOPIDINE) 0.5 % ophthalmic solution    bisacodyl (DULCOLAX) 5 MG EC tablet    levothyroxine (SYNTHROID/LEVOTHROID) 137 MCG tablet    levothyroxine (SYNTHROID/LEVOTHROID) 150 MCG tablet    polyethylene glycol (GOLYTELY) 236 g suspension    predniSONE (DELTASONE) 1 MG tablet    predniSONE (DELTASONE) 10 MG tablet    predniSONE (DELTASONE) 5 MG tablet    predniSONE (DELTASONE) 5 MG tablet    predniSONE (DELTASONE) 5 MG tablet    pyridostigmine (MESTINON) 60 MG tablet     No current facility-administered medications for this visit.     Review of Systems: A review of systems was obtained and was negative except for what was noted above.    Physical examination:    /88 (BP Location: Right arm, Patient Position: Sitting, Cuff Size: Adult Regular)   Pulse 61   Ht 1.753 m (5' 9.02\")   Wt 82.1 kg (181 lb)   SpO2 98%   BMI 26.72 kg/m      General Appearance: NAD     Skin: There are no " rashes or other skin lesions.     Neurologic examination:     Mental status:  Patient is alert, attentive, and oriented x 3.  Language is coherent and fluent without aphasia.  Memory, comprehension and ability to follow commands were intact.        Cranial nerves:  No ptosis at baseline and only mild left ptosis after 15 seconds upgaze. Mild left eye closure weakness.  Diplopia with lateral gaze but not mid position. Symmetric smile and full. No dysarthria.      Motor exam: No atrophy or fasciculations. Manual muscle testing revealed the following MRC grade muscle power:    Right Left   Neck flexion 5 5   Neck extension: 5 5   Shoulder ext rotation 5 5   Shoulder abduction:        5 5   Elbow extension: 5 5   Elbow flexion:            5 5   Wrist flexion:            5 5   Wrist extension:        5 5   Finger flexion 5 5   Finger extension 5 5   FDI 5 5   Hip flexion 5 5   Knee flexion 5 5   Knee extension 5 5   Dorsiflexion 5 5   Plantar flexion 5 5   Red indicates worse when compared to last examination  Green indicates improved when compared to last examination     MG Outcomes MG-ADL MG-Composite  strength (kg) MG medications   10/12/20 7 9 Right 35, Left 31 Pred 5 mg daily   11/18/20 5 6 Right 40, Left 39 Pred 25 mg daily   1/20/21 4 2 Right 41, Left 41 Prednisone 10mg daily   3/22/21 xxx xxx xxx Thymectomy   3/31/21   5 9 Right 44, Left 46 IVIG prior to thymectomy 2g/kg over 3 days (03/15-17/2021),  Prednisone 5mg daily   7/14/21 3 4 Right 43, Left 44 Pred 5 mg   3/14/22 3 Not tested (video) Not tested (video) Pred 5 mg   6/22 xxx xxx xxx Weaned off prednisone   2/8/23 7 8 Right 32 kg, left 36 Kg None (pred 20 mg started just 2 days ago)   5/3/23 5 6 Right 32 kg, left 35 kg Vyvgart series (4 weekly infusions) completed 3/28/23   7/19/23 5 6 Right 36 kg, left 33 kg Vyvgart series (currently in between infusion 2 and 3 on 3rd vyvgart cycle), pred 5 mg daily   9/20/23 3 3 Right 38 kg, Left 37 kg Ultomiris  started 8/18/23, Pred 5 mg   2/7/24 3 3 Right 38 kg, left 38 kg Ultomiris started 8/18/23, receiving q 2 months     Assessment:    Leonidas Pratt is a 59 year old man with ACHR ab positive generalized non-thymomatous myasthenia gravis s/p thymectomy in 3/2021. Since transitioning from vyvgart to ultomiris has has markedly improved.  Manifestations are more than minimal, but overall mild. Will proceed with ultomiris as below.     Plan:      1. Immunotherapy:   - Prednisone: Tapered off 9/2023  - Vyvgart: Suboptimally beneficial after 3 cycles between 2/23 and 7/23. No further vyvgart anticipated  - Ultomiris: Started 8/23. Continue 2700 mg  q 2 months  2. Thymectomy: Completed 3/22/21. Benefits from thymectomy may take 1-3 years to be fully appreciated. If disease continues to worsen or is not controlled with #1 will repeat CT chest to look for residual thymus tissue  3. Supportive care:  - Mestinon: Continue 60 mg BID to TID as needed  4. Potential MG triggers including heat, surgery, and illness. Certain medications and over the counter preparations may also cause worsening of MG symptoms. A list of cautionary medications can be found at: [email or web address]  5. Disclosures discussed and in AVS  6. Follow up 6 months. Sooner if needed.    -----  3/6/24: Note from Pat. He reports more MG sx (ptosis, face and limb weakness). Third ultomiris infusion was two weeks ago. Next scheduled for March 22 and again June 7 (he has a trip to [place] so that infusion timed early). He takes Mestinon 3x daily.  The morning dose seems to help, the later in the day ones don't help as much. Currently on no Prednisone (none since Ultomiris), but we may need to add some back. I advised him to restart 5 mg per day for now we can go up to 10 if needed.

## 2024-02-07 NOTE — PATIENT INSTRUCTIONS
I earn consulting income from [employer], [employer], [employer], and [employer]. These companies manufacture some of the products I am recommending for you. I would be happy to respond to any concerns or questions you may have.

## 2024-02-16 ENCOUNTER — DOCUMENTATION ONLY (OUTPATIENT)
Dept: PHARMACY | Facility: CLINIC | Age: 59
End: 2024-02-16
Payer: COMMERCIAL

## 2024-02-16 NOTE — PROGRESS NOTES
Skilled Nurse visit in the [facility] to administer Ultomiris 3300mg.  No recent elevated temperature, fever, chills, productive cough, coughing for 3 weeks or longer or hemoptysis, abnormal vital signs, night sweats, chest pain. No  decrease in your appetite, unexplained weight loss or fatigue.  No other new onset medical symptoms.  Current weight 182lb.  Peripheral IVleft Lower Forearm, 1attempt Pre medicated with none. Labs drawn none. Infusion completed without complication or reaction. Pt reports therapy is effective in managing symptoms related to therapy.   Melina Taylor RN  [facility] infusion  [email or web address]  [phone]

## 2024-03-06 RX ORDER — PREDNISONE 5 MG/1
5 TABLET ORAL DAILY
Qty: 60 TABLET | Refills: 3 | Status: SHIPPED | OUTPATIENT
Start: 2024-03-06

## 2024-03-11 DIAGNOSIS — G70.00 MYASTHENIA GRAVIS WITHOUT EXACERBATION (H): ICD-10-CM

## 2024-03-11 RX ORDER — PYRIDOSTIGMINE BROMIDE 60 MG/1
TABLET ORAL
Qty: 90 TABLET | Refills: 1 | Status: SHIPPED | OUTPATIENT
Start: 2024-03-11 | End: 2024-06-17

## 2024-04-12 ENCOUNTER — DOCUMENTATION ONLY (OUTPATIENT)
Dept: PHARMACY | Facility: CLINIC | Age: 59
End: 2024-04-12
Payer: COMMERCIAL

## 2024-04-12 NOTE — PROGRESS NOTES
Skilled Nurse visit in the [facility] to administer Ultomiris 3300 mg IV.  No recent elevated temperature, fever, chills, productive cough, coughing for 3 weeks or longer or hemoptysis, abnormal vital signs, night sweats, chest pain. No  decrease in your appetite, unexplained weight loss or fatigue.  No other new onset medical symptoms.  Current weight 175 lb.  Peripheral IV placed in left upper medial Forearm, 1 attempt. Pre medicated with none. Labs drawn none. Infusion completed without complication or reaction. Pt reports therapy is effective helping to keep symptoms from getting worse.     Brandie Gorman, JIMN, RN  [phone]  [email or web address]

## 2024-04-16 DIAGNOSIS — E03.9 HYPOTHYROIDISM, UNSPECIFIED TYPE: ICD-10-CM

## 2024-04-16 RX ORDER — LEVOTHYROXINE SODIUM 125 UG/1
125 TABLET ORAL DAILY
Qty: 90 TABLET | Refills: 1 | Status: CANCELLED | OUTPATIENT
Start: 2024-04-16

## 2024-04-18 DIAGNOSIS — E03.9 HYPOTHYROIDISM, UNSPECIFIED TYPE: ICD-10-CM

## 2024-04-18 RX ORDER — LEVOTHYROXINE SODIUM 125 UG/1
125 TABLET ORAL DAILY
Qty: 90 TABLET | Refills: 1 | Status: SHIPPED | OUTPATIENT
Start: 2024-04-18

## 2024-06-01 ENCOUNTER — HEALTH MAINTENANCE LETTER (OUTPATIENT)
Age: 59
End: 2024-06-01

## 2024-06-07 ENCOUNTER — DOCUMENTATION ONLY (OUTPATIENT)
Dept: PHARMACY | Facility: CLINIC | Age: 59
End: 2024-06-07
Payer: COMMERCIAL

## 2024-06-07 NOTE — PROGRESS NOTES
Skilled Nurse visit in the [facility] to administer Ultomiris 3300 mg IV.  No recent elevated temperature, fever, chills, productive cough, coughing for 3 weeks or longer or hemoptysis, abnormal vital signs, night sweats, chest pain. No decrease in appetite, unexplained weight loss or fatigue.  No other new onset medical symptoms.  Current weight 177.4 lb.  Peripheral IV placed in left upper forearm, 1 attempt. Pre medicated with none. Labs drawn none. Infusion completed without complication or reaction. Pt reports therapy is effective in helping to keep symptoms stable.      JIM ZamarripaN, RN  [phone]  [email or web address]

## 2024-06-14 ENCOUNTER — MYC REFILL (OUTPATIENT)
Dept: NEUROLOGY | Facility: CLINIC | Age: 59
End: 2024-06-14
Payer: COMMERCIAL

## 2024-06-14 DIAGNOSIS — G70.00 MYASTHENIA GRAVIS WITHOUT EXACERBATION (H): ICD-10-CM

## 2024-06-17 RX ORDER — PYRIDOSTIGMINE BROMIDE 60 MG/1
TABLET ORAL
Qty: 90 TABLET | Refills: 1 | Status: SHIPPED | OUTPATIENT
Start: 2024-06-17

## 2024-08-02 ENCOUNTER — DOCUMENTATION ONLY (OUTPATIENT)
Dept: PHARMACY | Facility: CLINIC | Age: 59
End: 2024-08-02
Payer: COMMERCIAL

## 2024-08-02 NOTE — PROGRESS NOTES
Skilled Nurse visit in the [facility] to administer Ultomiris 3300mg IV over 0.75 hours every 8 weeks.  No recent elevated temperature, fever, chills, productive cough, coughing for 3 weeks or longer or hemoptysis, abnormal vital signs, night sweats, chest pain. No  decrease in your appetite, unexplained weight loss or fatigue.  No other new onset medical symptoms.  Current weight 178 lbs.  Peripheral IV left Lower Forearm,1attempt. Infusion completed without complication or reaction. Pt reports therapy is effective in managing symptoms related to therapy.    Queenie Santos, RN, MSN, OCN, CRNI  [facility]

## 2024-08-21 ENCOUNTER — OFFICE VISIT (OUTPATIENT)
Dept: NEUROLOGY | Facility: CLINIC | Age: 59
End: 2024-08-21
Payer: COMMERCIAL

## 2024-08-21 VITALS
OXYGEN SATURATION: 99 % | DIASTOLIC BLOOD PRESSURE: 89 MMHG | SYSTOLIC BLOOD PRESSURE: 167 MMHG | HEART RATE: 71 BPM | RESPIRATION RATE: 16 BRPM

## 2024-08-21 DIAGNOSIS — G70.00 MYASTHENIA GRAVIS WITHOUT EXACERBATION (H): Primary | ICD-10-CM

## 2024-08-21 PROCEDURE — 99214 OFFICE O/P EST MOD 30 MIN: CPT | Performed by: PSYCHIATRY & NEUROLOGY

## 2024-08-21 NOTE — PROGRESS NOTES
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 Onset was around 2010. First symptom was diplopia. About 3 years later ptosis started. No dysarthria, dysphagia, SOB, or weakness. In 2015 he was found to have ACHR ab. In 2015 he was diagnosed with MG and started on prednisone. He started prednisone 60 mg and tapered off over about 3 months. He was off immunotherapy in 2016, and remained off until 12/2018. At that end of 2018 he developed diplopia and restarted prednisone 60 mg daily. He tapered over a few months. By 2/20 he was taking prednisone 5 mg daily. This time prednisone was helpful to alleviate ptosis and diplopia. He remained on low dose prednisone (around 5 mg) through most of 2019. He was stable during that time. In April 2020 he then developed worsening ptosis and diplopia of both eyes. In 4/20 he increased prednisone to 40 mg daily. By June 2020 he was back down to 5 mg. In 9/20 he then experienced new weakness in his left hand. Typing was difficult. He also felt that his legs were weaker. There was a clear drop off in his typical level of activity and exercise. No dysarthria or dysphagia. In 10/20 prednisone increased to 40 mg daily. Prednisone tapered to 10mg daily in 01/21, and 5 mg by 3/21. He underwent thymectomy on 3/22/21. Leading into thymectomy he received IVIG (2g/kg over 3 days from 3/15-17/2021). It was helpful but poorly tolerated (headache, nausea, malaise). In late 2021 and early 2022 he made several attempts to reduce prednisone below 5. By June 2022 he was able to wean off prednisone but in 1/23 he relapsed. In March 2023 he started vyvgart (series completed 3/28/23). He received three cycles. Vygart was partially helpful, but he was never able to get ADL above 5. In 8/23 he started ultomiris. In 9/23 he tapered off prednisone.     Interval history:   I last saw him in clinic on  24. He has now been on ultomiris for about 12 months. He is doing well. He still develops left eyelid ptosis, but not every day. He still experiences diplopia, but not everyday. He can play trombone at a fairly high level again. He struggles with holding the instrument up, but overall pretty good. No dysarthria or dysphagia. He has been exercising this summer. He does still take a small amount of prednisone, 5 every other day. With ultomiris , he notices sometimes worsening in the week before and after the infusion. Not always, but with some cycles.     Prior pertinent laboratory work-up:  : Normal/negative Hba1c, TSH  4/15: ACHR ab binding 3.1 (N<0.4)    Prior electrophysiologic work-up:  12/10: RNS of the right facial nerves showed no abnormal decreament or increment.Single fiber EMG of the right frontalis muscle was subjectively well within the normal range (normal <1.69).  All individual pairs demonstrated normal jitter ranging from 13 ms to 47.2 ms with normal <53.5 ms in the frontalis.  The mean jitter of the 20 pairs in the right frontalis muscle was 22.9 ms (nl< 35.5ms). There was no transmission blocking. There is no electrophysiological evidence for neuromuscular junction disorder.  10/14/20: NCS/RNS showed unequivocal decrement in facial-nasalis and ulnar-ADM.     Prior imagin/9/17 CT C/A/P showed no mediastinal mass  10/29/20: CT chest showed unchanged tiny soft tissue density in the anterior superior mediastinum, presumably residual thymus. No enlargement to suggest Thymoma.    Past Medical History:   Past Medical History:   Diagnosis Date    Asthma     Kidney stone     Myasthenia gravis (H)     Strabismus     Thyroid disease      Past Surgical History:  Past Surgical History:   Procedure Laterality Date    COLONOSCOPY N/A 2023    Procedure: COLONOSCOPY;  Surgeon: Trevor Perez MD;  Location: [facility] OR    HERNIA REPAIR      LASER HOLMIUM LITHOTRIPSY URETER(S), INSERT STENT,  COMBINED Right 10/15/2014    Procedure: COMBINED CYSTOSCOPY, URETEROSCOPY, LASER HOLMIUM LITHOTRIPSY URETER(S), INSERT STENT;  Surgeon: Thong Bates MD;  Location: [facility] OR    RECESSION RESECTION WITH ADJUSTABLE SUTURE  9/4/2012    LLRc 5.0mm on adj. LIRc 4.5mm on adj.    RECESSION RESECTION WITH ADJUSTABLE SUTURE BILATERAL  12/18/2012    RSRc 3.0mm; adj. suture    STRABISMUS SURGERY      THORACOSCOPIC THYMECTOMY N/A 3/22/2021    Procedure: SUBXIPHOID BILATERAL THORACOSCOPIC THYMECTOMY;  Surgeon: Benjy Hull MD;  Location: [facility] OR     Family history:    There is no known family history of myopathy or other neuromuscular disorders. He does have a sister with spasmodic dysphonia.     Social History:    He denies tobacco, alcohol, or illicit drug use.     Medical Allergies:    Allergies   Allergen Reactions    Nkda [No Known Drug Allergy]      Current Medications:    Current Outpatient Medications   Medication Sig Dispense Refill    albuterol (PROAIR HFA/PROVENTIL HFA/VENTOLIN HFA) 108 (90 Base) MCG/ACT inhaler Inhale 2 puffs into the lungs every 6 hours as needed for shortness of breath or wheezing 18 g 1    levothyroxine (SYNTHROID/LEVOTHROID) 125 MCG tablet Take 1 tablet (125 mcg) by mouth daily 90 tablet 1    predniSONE (DELTASONE) 5 MG tablet Take 1 tablet (5 mg) by mouth daily 60 tablet 3    pyRIDostigmine (MESTINON) 60 MG tablet TAKE ONE TABLET BY MOUTH 2 TO 3 TIMES A DAY. 90 tablet 1    pyRIDostigmine (MESTINON) 60 MG tablet TAKE ONE TABLET BY MOUTH 2 TO 3 TIMES A DAY. 90 tablet 1    Vitamin D, Cholecalciferol, 25 MCG (1000 UT) TABS Take by mouth every morning      apraclonidine (IOPIDINE) 0.5 % ophthalmic solution Place 1 drop into both eyes 3 times daily as needed (ptosis) For additional refills, please schedule a follow-up appointment at [phone]. (Patient not taking: Reported on 7/19/2023) 5 mL 0    bisacodyl (DULCOLAX) 5 MG EC tablet Take 2 tablets at 3 pm the day before your  procedure. If your procedure is before 11 am, take 2 additional tablets at 11 pm. If your procedure is after 11 am, take 2 additional tablets at 6 am. For additional instructions refer to your colonoscopy prep instructions. 4 tablet 0    levothyroxine (SYNTHROID/LEVOTHROID) 137 MCG tablet Take 1 tablet (137 mcg) by mouth daily 90 tablet 3    levothyroxine (SYNTHROID/LEVOTHROID) 150 MCG tablet Take 1 tablet (150 mcg) by mouth daily 90 tablet 3    polyethylene glycol (GOLYTELY) 236 g suspension The night before the exam at 6 pm drink an 8-ounce glass every 15 minutes until the jug is half empty. If you arrive before 11 AM: Drink the other half of the Golytely jug at 11 PM night before procedure. If you arrive after 11 AM: Drink the other half of the Golytely jug at 6 AM day of procedure. For additional instructions refer to your colonoscopy prep instructions. 4000 mL 0    predniSONE (DELTASONE) 1 MG tablet Take 1 tablet (1 mg) by mouth See Admin Instructions Take 1 to 4 tablets by mouth daily as directed. 120 tablet 3    predniSONE (DELTASONE) 10 MG tablet Take 2 tablets (20 mg) by mouth daily (Patient not taking: Reported on 7/19/2023) 60 tablet 1    predniSONE (DELTASONE) 5 MG tablet Take 1 tablet (5 mg) by mouth See Admin Instructions 60 tablet 3    predniSONE (DELTASONE) 5 MG tablet Take 1 tablet (5 mg) by mouth daily 30 tablet 5    predniSONE (DELTASONE) 5 MG tablet Take one tablet daily 30 tablet 2    pyridostigmine (MESTINON) 60 MG tablet TAKE ONE TABLET BY MOUTH THREE TIMES A DAY 90 tablet 3     No current facility-administered medications for this visit.     Review of Systems: A review of systems was obtained and was negative except for what was noted above.    Physical examination:    BP (!) 167/89   Pulse 71   Resp 16   SpO2 99%     General Appearance: NAD     Skin: There are no rashes or other skin lesions.     Neurologic examination:     Mental status:  Patient is alert, attentive, and oriented x 3.   Language is coherent and fluent without aphasia.  Memory, comprehension and ability to follow commands were intact.        Cranial nerves:  No ptosis at baseline. Mild left eyelid ptosis emerges after about 30 seconds. Diplopia occurs after a few seconds.  Symmetric smile and full. No dysarthria.      Motor exam: No atrophy or fasciculations. Manual muscle testing revealed the following MRC grade muscle power:    Right Left   Neck flexion 5 5   Neck extension: 5 5   Shoulder ext rotation 5 5   Shoulder abduction:        5 5   Elbow extension: 5 5   Elbow flexion:            5 5   Wrist flexion:            5 5   Wrist extension:        5 5   Finger flexion 5 5   Finger extension 5 5   FDI 5 5   Hip flexion 5 5   Knee flexion 5 5   Knee extension 5 5   Dorsiflexion 5 5   Plantar flexion 5 5   Red indicates worse when compared to last examination  Green indicates improved when compared to last examination     MG Outcomes MG-ADL MG-Composite  strength (kg) MG medications   10/12/20 7 9 Right 35, Left 31 Pred 5 mg daily   11/18/20 5 6 Right 40, Left 39 Pred 25 mg daily   1/20/21 4 2 Right 41, Left 41 Prednisone 10mg daily   3/22/21 xxx xxx xxx Thymectomy   3/31/21   5 9 Right 44, Left 46 IVIG prior to thymectomy 2g/kg over 3 days (03/15-17/2021),  Prednisone 5mg daily   7/14/21 3 4 Right 43, Left 44 Pred 5 mg   3/14/22 3 Not tested (video) Not tested (video) Pred 5 mg   6/22 xxx xxx xxx Weaned off prednisone   2/8/23 7 8 Right 32 kg, left 36 Kg None (pred 20 mg started just 2 days ago)   5/3/23 5 6 Right 32 kg, left 35 kg Vyvgart series (4 weekly infusions) completed 3/28/23   7/19/23 5 6 Right 36 kg, left 33 kg Vyvgart series (currently in between infusion 2 and 3 on 3rd vyvgart cycle), pred 5 mg daily   9/20/23 3 3 Right 38 kg, Left 37 kg Ultomiris started 8/18/23, Pred 5 mg   2/7/24 3 3 Right 38 kg, left 38 kg Ultomiris started 8/18/23, receiving q 2 months   8/21/24 2 3 Right 39 kg, Left 42 kg Ultomiris started  8/18/23, receiving q 2 months; pred 5 mg qOD     Assessment:    Leonidas Pratt is a 59 year old man with ACHR ab positive generalized non-thymomatous myasthenia gravis s/p thymectomy in 3/2021. He has done well over the last year on  ultomiris. Manifestations are almost minimal. Will proceed with ultomiris as below.     Plan:      1. Immunotherapy:   - Prednisone: Currently 5 mg every other day. Will try to taper off: 2.5 mg every other day x 2 weeks then stop.   - Vyvgart: Suboptimally beneficial after 3 cycles between 2/23 and 7/23. No further vyvgart anticipated  - Ultomiris: Started 8/23. Continue 2700 mg  q 2 months. May administer q 7 weeks if end of cycle worsening persists.   2. Thymectomy: Completed 3/22/21. Benefits from thymectomy may take 1-3 years to be fully appreciated.  3. Supportive care:  - Mestinon: Continue 60 mg BID to TID as needed  4. Potential MG triggers including heat, surgery, and illness. Certain medications and over the counter preparations may also cause worsening of MG symptoms. A list of cautionary medications can be found at: [email or web address]-Drugs  5. Disclosures discussed and in AVS  6. Follow up 6 months. Sooner if needed.    -----

## 2024-08-21 NOTE — LETTER
8/21/2024       RE: Leonidas Pratt  [address]  [place] [address]     Dear Colleague,    Thank you for referring your patient, Leonidas Pratt, to the [facility] [place] at [facility]. Please see a copy of my visit note below.    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 Onset was around 2010. First symptom was diplopia. About 3 years later ptosis started. No dysarthria, dysphagia, SOB, or weakness. In 2015 he was found to have ACHR ab. In 2015 he was diagnosed with MG and started on prednisone. He started prednisone 60 mg and tapered off over about 3 months. He was off immunotherapy in 2016, and remained off until 12/2018. At that end of 2018 he developed diplopia and restarted prednisone 60 mg daily. He tapered over a few months. By 2/20 he was taking prednisone 5 mg daily. This time prednisone was helpful to alleviate ptosis and diplopia. He remained on low dose prednisone (around 5 mg) through most of 2019. He was stable during that time. In April 2020 he then developed worsening ptosis and diplopia of both eyes. In 4/20 he increased prednisone to 40 mg daily. By June 2020 he was back down to 5 mg. In 9/20 he then experienced new weakness in his left hand. Typing was difficult. He also felt that his legs were weaker. There was a clear drop off in his typical level of activity and exercise. No dysarthria or dysphagia. In 10/20 prednisone increased to 40 mg daily. Prednisone tapered to 10mg daily in 01/21, and 5 mg by 3/21. He underwent thymectomy on 3/22/21. Leading into thymectomy he received IVIG (2g/kg over 3 days from 3/15-17/2021). It was helpful but poorly tolerated (headache, nausea, malaise). In late 2021 and early 2022 he made several attempts to reduce prednisone below 5. By June 2022 he was able to  wean off prednisone but in  he relapsed. In 2023 he started vyvgart (series completed 3/28/23). He received three cycles. Vygart was partially helpful, but he was never able to get ADL above 5. In  he started ultomiris. In  he tapered off prednisone.     Interval history:   I last saw him in clinic on 24. He has now been on ultomiris for about 12 months. He is doing well. He still develops left eyelid ptosis, but not every day. He still experiences diplopia, but not everyday. He can play trombone at a fairly high level again. He struggles with holding the instrument up, but overall pretty good. No dysarthria or dysphagia. He has been exercising this summer. He does still take a small amount of prednisone, 5 every other day. With ultomiris , he notices sometimes worsening in the week before and after the infusion. Not always, but with some cycles.     Prior pertinent laboratory work-up:  : Normal/negative Hba1c, TSH  4/15: ACHR ab binding 3.1 (N<0.4)    Prior electrophysiologic work-up:  12/10: RNS of the right facial nerves showed no abnormal decreament or increment.Single fiber EMG of the right frontalis muscle was subjectively well within the normal range (normal <1.69).  All individual pairs demonstrated normal jitter ranging from 13 ms to 47.2 ms with normal <53.5 ms in the frontalis.  The mean jitter of the 20 pairs in the right frontalis muscle was 22.9 ms (nl< 35.5ms). There was no transmission blocking. There is no electrophysiological evidence for neuromuscular junction disorder.  10/14/20: NCS/RNS showed unequivocal decrement in facial-nasalis and ulnar-ADM.     Prior imagin/9/17 CT C/A/P showed no mediastinal mass  10/29/20: CT chest showed unchanged tiny soft tissue density in the anterior superior mediastinum, presumably residual thymus. No enlargement to suggest Thymoma.    Past Medical History:   Past Medical History:   Diagnosis Date     Asthma      Kidney stone       Myasthenia gravis (H)      Strabismus      Thyroid disease      Past Surgical History:  Past Surgical History:   Procedure Laterality Date     COLONOSCOPY N/A 1/13/2023    Procedure: COLONOSCOPY;  Surgeon: Trevor Perez MD;  Location: [facility] OR     HERNIA REPAIR       LASER HOLMIUM LITHOTRIPSY URETER(S), INSERT STENT, COMBINED Right 10/15/2014    Procedure: COMBINED CYSTOSCOPY, URETEROSCOPY, LASER HOLMIUM LITHOTRIPSY URETER(S), INSERT STENT;  Surgeon: Thong Bates MD;  Location: [facility] OR     RECESSION RESECTION WITH ADJUSTABLE SUTURE  9/4/2012    LLRc 5.0mm on adj. LIRc 4.5mm on adj.     RECESSION RESECTION WITH ADJUSTABLE SUTURE BILATERAL  12/18/2012    RSRc 3.0mm; adj. suture     STRABISMUS SURGERY       THORACOSCOPIC THYMECTOMY N/A 3/22/2021    Procedure: SUBXIPHOID BILATERAL THORACOSCOPIC THYMECTOMY;  Surgeon: Benjy Hull MD;  Location: [facility] OR     Family history:    There is no known family history of myopathy or other neuromuscular disorders. He does have a sister with spasmodic dysphonia.     Social History:    He denies tobacco, alcohol, or illicit drug use.     Medical Allergies:    Allergies   Allergen Reactions     Nkda [No Known Drug Allergy]      Current Medications:    Current Outpatient Medications   Medication Sig Dispense Refill     albuterol (PROAIR HFA/PROVENTIL HFA/VENTOLIN HFA) 108 (90 Base) MCG/ACT inhaler Inhale 2 puffs into the lungs every 6 hours as needed for shortness of breath or wheezing 18 g 1     levothyroxine (SYNTHROID/LEVOTHROID) 125 MCG tablet Take 1 tablet (125 mcg) by mouth daily 90 tablet 1     predniSONE (DELTASONE) 5 MG tablet Take 1 tablet (5 mg) by mouth daily 60 tablet 3     pyRIDostigmine (MESTINON) 60 MG tablet TAKE ONE TABLET BY MOUTH 2 TO 3 TIMES A DAY. 90 tablet 1     pyRIDostigmine (MESTINON) 60 MG tablet TAKE ONE TABLET BY MOUTH 2 TO 3 TIMES A DAY. 90 tablet 1     Vitamin D, Cholecalciferol, 25 MCG (1000 UT) TABS Take by mouth every morning        apraclonidine (IOPIDINE) 0.5 % ophthalmic solution Place 1 drop into both eyes 3 times daily as needed (ptosis) For additional refills, please schedule a follow-up appointment at [phone]. (Patient not taking: Reported on 7/19/2023) 5 mL 0     bisacodyl (DULCOLAX) 5 MG EC tablet Take 2 tablets at 3 pm the day before your procedure. If your procedure is before 11 am, take 2 additional tablets at 11 pm. If your procedure is after 11 am, take 2 additional tablets at 6 am. For additional instructions refer to your colonoscopy prep instructions. 4 tablet 0     levothyroxine (SYNTHROID/LEVOTHROID) 137 MCG tablet Take 1 tablet (137 mcg) by mouth daily 90 tablet 3     levothyroxine (SYNTHROID/LEVOTHROID) 150 MCG tablet Take 1 tablet (150 mcg) by mouth daily 90 tablet 3     polyethylene glycol (GOLYTELY) 236 g suspension The night before the exam at 6 pm drink an 8-ounce glass every 15 minutes until the jug is half empty. If you arrive before 11 AM: Drink the other half of the Golytely jug at 11 PM night before procedure. If you arrive after 11 AM: Drink the other half of the Golytely jug at 6 AM day of procedure. For additional instructions refer to your colonoscopy prep instructions. 4000 mL 0     predniSONE (DELTASONE) 1 MG tablet Take 1 tablet (1 mg) by mouth See Admin Instructions Take 1 to 4 tablets by mouth daily as directed. 120 tablet 3     predniSONE (DELTASONE) 10 MG tablet Take 2 tablets (20 mg) by mouth daily (Patient not taking: Reported on 7/19/2023) 60 tablet 1     predniSONE (DELTASONE) 5 MG tablet Take 1 tablet (5 mg) by mouth See Admin Instructions 60 tablet 3     predniSONE (DELTASONE) 5 MG tablet Take 1 tablet (5 mg) by mouth daily 30 tablet 5     predniSONE (DELTASONE) 5 MG tablet Take one tablet daily 30 tablet 2     pyridostigmine (MESTINON) 60 MG tablet TAKE ONE TABLET BY MOUTH THREE TIMES A DAY 90 tablet 3     No current facility-administered medications for this visit.     Review of  Systems: A review of systems was obtained and was negative except for what was noted above.    Physical examination:    BP (!) 167/89   Pulse 71   Resp 16   SpO2 99%     General Appearance: NAD     Skin: There are no rashes or other skin lesions.     Neurologic examination:     Mental status:  Patient is alert, attentive, and oriented x 3.  Language is coherent and fluent without aphasia.  Memory, comprehension and ability to follow commands were intact.        Cranial nerves:  No ptosis at baseline. Mild left eyelid ptosis emerges after about 30 seconds. Diplopia occurs after a few seconds.  Symmetric smile and full. No dysarthria.      Motor exam: No atrophy or fasciculations. Manual muscle testing revealed the following MRC grade muscle power:    Right Left   Neck flexion 5 5   Neck extension: 5 5   Shoulder ext rotation 5 5   Shoulder abduction:        5 5   Elbow extension: 5 5   Elbow flexion:            5 5   Wrist flexion:            5 5   Wrist extension:        5 5   Finger flexion 5 5   Finger extension 5 5   FDI 5 5   Hip flexion 5 5   Knee flexion 5 5   Knee extension 5 5   Dorsiflexion 5 5   Plantar flexion 5 5   Red indicates worse when compared to last examination  Green indicates improved when compared to last examination     MG Outcomes MG-ADL MG-Composite  strength (kg) MG medications   10/12/20 7 9 Right 35, Left 31 Pred 5 mg daily   11/18/20 5 6 Right 40, Left 39 Pred 25 mg daily   1/20/21 4 2 Right 41, Left 41 Prednisone 10mg daily   3/22/21 xxx xxx xxx Thymectomy   3/31/21   5 9 Right 44, Left 46 IVIG prior to thymectomy 2g/kg over 3 days (03/15-17/2021),  Prednisone 5mg daily   7/14/21 3 4 Right 43, Left 44 Pred 5 mg   3/14/22 3 Not tested (video) Not tested (video) Pred 5 mg   6/22 xxx xxx xxx Weaned off prednisone   2/8/23 7 8 Right 32 kg, left 36 Kg None (pred 20 mg started just 2 days ago)   5/3/23 5 6 Right 32 kg, left 35 kg Vyvgart series (4 weekly infusions) completed 3/28/23    7/19/23 5 6 Right 36 kg, left 33 kg Vyvgart series (currently in between infusion 2 and 3 on 3rd vyvgart cycle), pred 5 mg daily   9/20/23 3 3 Right 38 kg, Left 37 kg Ultomiris started 8/18/23, Pred 5 mg   2/7/24 3 3 Right 38 kg, left 38 kg Ultomiris started 8/18/23, receiving q 2 months   8/21/24 2 3 Right 39 kg, Left 42 kg Ultomiris started 8/18/23, receiving q 2 months; pred 5 mg qOD     Assessment:    Leonidas Pratt is a 59 year old man with ACHR ab positive generalized non-thymomatous myasthenia gravis s/p thymectomy in 3/2021. He has done well over the last year on  ultomiris. Manifestations are almost minimal. Will proceed with ultomiris as below.     Plan:      1. Immunotherapy:   - Prednisone: Currently 5 mg every other day. Will try to taper off: 2.5 mg every other day x 2 weeks then stop.   - Vyvgart: Suboptimally beneficial after 3 cycles between 2/23 and 7/23. No further vyvgart anticipated  - Ultomiris: Started 8/23. Continue 2700 mg  q 2 months. May administer q 7 weeks if end of cycle worsening persists.   2. Thymectomy: Completed 3/22/21. Benefits from thymectomy may take 1-3 years to be fully appreciated.  3. Supportive care:  - Mestinon: Continue 60 mg BID to TID as needed  4. Potential MG triggers including heat, surgery, and illness. Certain medications and over the counter preparations may also cause worsening of MG symptoms. A list of cautionary medications can be found at: [email or web address]://[email or web address].org/What-is-MG/MG-Management/Cautionary-Drugs  5. Disclosures discussed and in AVS  6. Follow up 6 months. Sooner if needed.    -----      Again, thank you for allowing me to participate in the care of your patient.      Sincerely,    Rj Jackson MD

## 2024-09-05 ENCOUNTER — ENROLLMENT (OUTPATIENT)
Dept: HOME HEALTH SERVICES | Facility: HOME HEALTH | Age: 59
End: 2024-09-05
Payer: COMMERCIAL

## 2024-09-27 ENCOUNTER — DOCUMENTATION ONLY (OUTPATIENT)
Dept: HOME HEALTH SERVICES | Facility: HOME HEALTH | Age: 59
End: 2024-09-27

## 2024-09-28 NOTE — PROGRESS NOTES
Skilled Nurse visit in the [facility] to administer Ultomiris 3300 mg IV.  No recent elevated temperature, fever, chills, productive cough, coughing for 3 weeks or longer or hemoptysis, abnormal vital signs, night sweats, chest pain. No decrease in appetite, unexplained weight loss or fatigue.  No other new onset medical symptoms.  Current weight 170 lb.  Peripheral IV placed in left upper forearm, 1 attempt. Pre medicated with none. Labs drawn none. Infusion completed without complication or reaction. Pt reports therapy is effective in helping to keep symptoms stable.      JIM ZamarripaN, RN  [phone]  [email or web address]

## 2024-10-17 ENCOUNTER — HOME INFUSION (OUTPATIENT)
Dept: HOME HEALTH SERVICES | Facility: HOME HEALTH | Age: 59
End: 2024-10-17
Payer: COMMERCIAL

## 2024-10-17 VITALS — HEIGHT: 68 IN | WEIGHT: 170 LBS | BODY MASS INDEX: 25.76 KG/M2

## 2024-10-17 DIAGNOSIS — G70.00 MYASTHENIA GRAVIS WITHOUT EXACERBATION (H): Primary | ICD-10-CM

## 2024-11-08 ENCOUNTER — TELEPHONE (OUTPATIENT)
Dept: ENDOCRINOLOGY | Facility: CLINIC | Age: 59
End: 2024-11-08
Payer: COMMERCIAL

## 2024-11-08 DIAGNOSIS — E03.9 HYPOTHYROIDISM, UNSPECIFIED TYPE: ICD-10-CM

## 2024-11-08 RX ORDER — LEVOTHYROXINE SODIUM 125 UG/1
125 TABLET ORAL DAILY
Qty: 90 TABLET | Refills: 0 | Status: SHIPPED | OUTPATIENT
Start: 2024-11-08

## 2024-11-08 NOTE — TELEPHONE ENCOUNTER
Medication Requested:   Disp Refills Start End LOKESH   levothyroxine (SYNTHROID/LEVOTHROID) 125 MCG tablet 90 tablet 1 4/18/2024 -- No   Sig - Route: Take 1 tablet (125 mcg) by mouth daily - Oral     ----------------------  Last Office Visit : 8/8/2023  [facility] [place]          Future Office visit:  0  ----------------------      [x]Medication unable to be refilled by RN due to criteria not met as indicated below:     -TSH overdue for recheck  -Per last visit note, patient's recommended RTC: 6 mo  -Clinic Coordinator/Scheduling notified to follow up with the patient as appropriate.       -Scheduling: Please contact the pt for appointment, thanks!    -Scheduling has been notified to contact the pt for appointment.  -Please order the following labs:    Pass/Fail Protocol Criteria:  TSH   Date Value Ref Range Status   08/18/2023 0.16 (L) 0.30 - 4.20 uIU/mL Final   04/13/2021 3.45 0.40 - 4.00 mU/L Final

## 2024-11-08 NOTE — TELEPHONE ENCOUNTER
Left Voicemail (1st Attempt) and Sent Mychart (1st Attempt) for the patient to call back and schedule the following:    Appointment type: return endo  Provider: Mayelin  Return date: next avail for med refills  Specialty phone number: [phone]  Additional appointment(s) needed:   Additonal Notes:   KAYLEEN, Tiffanie Stein, RN to Clinic [id number]  Mayelin, MD EARNEST Ford      11/8/24  2:10 PM   -TSH overdue for recheck    -Scheduling: Please contact the pt for appointment, thanks!  Per last visit note, patient's recommended RTC: 6 mo    Mali Mcclure on 11/8/2024 at 3:27 PM

## 2024-11-10 ENCOUNTER — HOME INFUSION (OUTPATIENT)
Dept: HOME HEALTH SERVICES | Facility: HOME HEALTH | Age: 59
End: 2024-11-10
Payer: COMMERCIAL

## 2024-11-12 NOTE — TELEPHONE ENCOUNTER
Left Voicemail (2nd Attempt) for the patient to call back and schedule the following:    Appointment type: return endo  Provider: Mayelin  Return date: next avail for med refills  Specialty phone number: [phone]  Additional appointment(s) needed:   Additonal Notes: LVM x2, MyC x1      Tiffanie Hassan, RN to Clinic [id number]  Mayelin, MD EARNEST Ford      11/8/24  2:10 PM   -TSH overdue for recheck    -Scheduling: Please contact the pt for appointment, thanks!  Per last visit note, patient's recommended RTC: 6 mo    Tasneem Winn on 11/12/2024 at 10:44 AM

## 2024-11-20 ENCOUNTER — HOME INFUSION (OUTPATIENT)
Dept: HOME HEALTH SERVICES | Facility: HOME HEALTH | Age: 59
End: 2024-11-20
Payer: COMMERCIAL

## 2024-11-20 DIAGNOSIS — Z00.00 HEALTH CARE MAINTENANCE: ICD-10-CM

## 2024-11-20 DIAGNOSIS — E03.9 HYPOTHYROIDISM, UNSPECIFIED TYPE: Primary | ICD-10-CM

## 2024-11-21 ENCOUNTER — HOME INFUSION BILLING (OUTPATIENT)
Dept: HOME HEALTH SERVICES | Facility: HOME HEALTH | Age: 59
End: 2024-11-21
Payer: COMMERCIAL

## 2024-11-22 ENCOUNTER — HOME INFUSION BILLING (OUTPATIENT)
Dept: HOME HEALTH SERVICES | Facility: HOME HEALTH | Age: 59
End: 2024-11-22
Payer: COMMERCIAL

## 2024-11-22 ENCOUNTER — LAB REQUISITION (OUTPATIENT)
Dept: LAB | Facility: CLINIC | Age: 59
End: 2024-11-22
Payer: COMMERCIAL

## 2024-11-22 DIAGNOSIS — G70.00 MYASTHENIA GRAVIS WITHOUT (ACUTE) EXACERBATION (H): ICD-10-CM

## 2024-11-22 LAB
CHOLEST SERPL-MCNC: 276 MG/DL
EST. AVERAGE GLUCOSE BLD GHB EST-MCNC: 111 MG/DL
FASTING STATUS PATIENT QL REPORTED: ABNORMAL
HBA1C MFR BLD: 5.5 %
HDLC SERPL-MCNC: 39 MG/DL
LDLC SERPL CALC-MCNC: 203 MG/DL
NONHDLC SERPL-MCNC: 237 MG/DL
T4 FREE SERPL-MCNC: 1.36 NG/DL (ref 0.9–1.7)
TRIGL SERPL-MCNC: 172 MG/DL
TSH SERPL DL<=0.005 MIU/L-ACNC: 6.13 UIU/ML (ref 0.3–4.2)

## 2024-11-22 PROCEDURE — 80061 LIPID PANEL: CPT | Performed by: FAMILY MEDICINE

## 2024-11-22 PROCEDURE — 83036 HEMOGLOBIN GLYCOSYLATED A1C: CPT | Performed by: FAMILY MEDICINE

## 2024-11-22 PROCEDURE — 82465 ASSAY BLD/SERUM CHOLESTEROL: CPT | Performed by: FAMILY MEDICINE

## 2024-11-22 PROCEDURE — 84439 ASSAY OF FREE THYROXINE: CPT | Performed by: FAMILY MEDICINE

## 2024-11-22 PROCEDURE — E0781 EXTERNAL AMBULATORY INFUS PU: HCPCS | Mod: RR

## 2024-11-22 PROCEDURE — S9338 HIT IMMUNOTHERAPY DIEM: HCPCS

## 2024-11-22 PROCEDURE — 84443 ASSAY THYROID STIM HORMONE: CPT | Performed by: FAMILY MEDICINE

## 2024-11-24 DIAGNOSIS — G70.00 MYASTHENIA GRAVIS WITHOUT EXACERBATION (H): ICD-10-CM

## 2024-11-25 RX ORDER — PYRIDOSTIGMINE BROMIDE 60 MG/1
TABLET ORAL
Qty: 90 TABLET | Refills: 1 | Status: SHIPPED | OUTPATIENT
Start: 2024-11-25

## 2024-12-22 PROCEDURE — E0781 EXTERNAL AMBULATORY INFUS PU: HCPCS | Mod: RR

## 2025-01-15 ENCOUNTER — TELEPHONE (OUTPATIENT)
Dept: NEUROLOGY | Facility: CLINIC | Age: 60
End: 2025-01-15
Payer: COMMERCIAL

## 2025-01-15 NOTE — TELEPHONE ENCOUNTER
Leonidas to begin IVIG infusions.  Orders routed to Dr. Jackson. Once signed, [facility] [place] will be notified.    Lynda Smith RN

## 2025-01-20 ENCOUNTER — ENROLLMENT (OUTPATIENT)
Dept: HOME HEALTH SERVICES | Facility: HOME HEALTH | Age: 60
End: 2025-01-20
Payer: COMMERCIAL

## 2025-01-27 ENCOUNTER — HOME INFUSION (OUTPATIENT)
Dept: HOME HEALTH SERVICES | Facility: HOME HEALTH | Age: 60
End: 2025-01-27
Payer: COMMERCIAL

## 2025-02-18 ENCOUNTER — HOME INFUSION BILLING (OUTPATIENT)
Dept: HOME HEALTH SERVICES | Facility: HOME HEALTH | Age: 60
End: 2025-02-18
Payer: COMMERCIAL

## 2025-02-18 ENCOUNTER — HOME INFUSION (OUTPATIENT)
Dept: HOME HEALTH SERVICES | Facility: HOME HEALTH | Age: 60
End: 2025-02-18

## 2025-02-18 ENCOUNTER — HOME CARE VISIT (OUTPATIENT)
Dept: HOME HEALTH SERVICES | Facility: HOME HEALTH | Age: 60
End: 2025-02-18
Payer: COMMERCIAL

## 2025-02-18 VITALS
RESPIRATION RATE: 16 BRPM | HEART RATE: 68 BPM | TEMPERATURE: 97.7 F | WEIGHT: 175 LBS | SYSTOLIC BLOOD PRESSURE: 123 MMHG | BODY MASS INDEX: 27 KG/M2 | DIASTOLIC BLOOD PRESSURE: 89 MMHG | OXYGEN SATURATION: 97 %

## 2025-02-18 PROCEDURE — 99601 HOME NFS VISIT <2 HRS: CPT | Mod: SS

## 2025-02-18 PROCEDURE — A4215 STERILE NEEDLE: HCPCS

## 2025-02-18 PROCEDURE — S1015 IV TUBING EXTENSION SET: HCPCS

## 2025-02-18 PROCEDURE — 99602 HOME NFS VISIT EACH ADDL HR: CPT | Mod: SS

## 2025-02-18 PROCEDURE — S9338 HIT IMMUNOTHERAPY DIEM: HCPCS

## 2025-02-18 PROCEDURE — E0781 EXTERNAL AMBULATORY INFUS PU: HCPCS | Mod: RR

## 2025-02-18 NOTE — PROGRESS NOTES
Nursing Visit Note:  Nurse visit today for IVIG for Leonidas Pratt.     present during visit today: Not Applicable.    Intravenous Access:  Peripheral IV placed.    Infusion Nursing Note:    Pre-infusion Checklist:   Have you had any delayed reaction since last infusion?   No     Have you recently had an elevated temperature, fever, chills productive cough, coughing for 3 weeks or longer or hemoptysis, abnormal vital signs, night sweats, chest pain, or have you noticed a decrease in your appetite, or noted unexplained weight loss or fatigue?   No     Do you have any open wounds or new incisions?  No     Do you have any recent or upcoming hospitalizations, surgeries, or dental procedures? Does not include esophagogastroduodenoscopy, colonoscopy, endoscopic retrograde cholangiopancreatography (ERCP), endoscopic ultrasound (EUS), dental procedures or joint aspiration/steroid injections.   No     Do you currently have or recently have had any signs of illness or infection or are you on any antibiotics?   No     Have you had any new, sudden, or worsening abdominal pain?   No     Have you or anyone in your household received a live vaccination in the past 4 weeks?   No     Have you recently been diagnosed with any new nervous system diseases or cancer diagnosis? (i.e., Multiple Sclerosis, Guillain [place], seizures, neurological changes) Are you receiving any form of radiation or chemotherapy?   No     Are you pregnant or breastfeeding, or do you have plans of pregnancy in the future?   No     Have you been having any signs of worsening depression or suicidal ideation?  No     Have you had any other new onset medical symptoms?  No    Entyvio/Ocrevus/Tyasabri only: Have you been having any new or worsening medical problems such as issues with thinking, eyesight, balance or strength that have persisted over several days?   N/A    Benlysta only: Have you been having any signs of worsening depression or suicidal  "ideations?    N/A    IVIG only: Have you had any new blood clots?  No    Did the patient answer \"YES\" to any of the questions above?  No     Will the patient receive a medication that has an order for infusion reaction management?  Yes, and all drugs and supplies are available and none have .     If ordered, has the patient taken pre-medications?  Yes    Plan:   Therapy is appropriate, will proceed with treatment.     Post Infusion Assessment:  Patient tolerated infusion without incident.  Access discontinued per protocol.       Note: Pt A&Ox4. Denies pain. Symptoms include eyelid drooping, double vision, muscle weakness in BUE & BLE that interfere with his activity level. Pt still able to perform ADL independently.   Pre-medications:   acetaminophen 650 mg and diphenhydramine 50 mg by mouth at 11:07   hydrocortisone sodium succinate 100 mg IV push 11:07-11:13   Completed infusion at 13:49 and added 20 mL flush to bag. Pt observed for 30 min post infusion. PIV removed without complications.     Saline administered: 20 mL flush   10 mL before & after pre medications IV push  (ml)    Supply Check:   Does the patient have all the supplies they need for the next visit?  Yes    Next visit plan:  at 11:00 am     Rhea Phillips RN 2025  "

## 2025-02-19 ENCOUNTER — OFFICE VISIT (OUTPATIENT)
Dept: NEUROLOGY | Facility: CLINIC | Age: 60
End: 2025-02-19
Payer: COMMERCIAL

## 2025-02-19 VITALS
OXYGEN SATURATION: 96 % | RESPIRATION RATE: 16 BRPM | SYSTOLIC BLOOD PRESSURE: 135 MMHG | DIASTOLIC BLOOD PRESSURE: 83 MMHG | HEART RATE: 78 BPM

## 2025-02-19 DIAGNOSIS — G70.00 MYASTHENIA GRAVIS WITHOUT EXACERBATION (H): Primary | ICD-10-CM

## 2025-02-19 ASSESSMENT — ACTIVITIES OF DAILY LIVING (ADL)
BREATHING: NORMAL
CHEWING: NORMAL
TALKING: NORMAL
IMPAIRMENT OF ABILITY TO RISE FROM CHAIR: NORMAL
SWALLOWING: NORMAL
EYELID DROOP: CONSTANT
IMPAIRMENT OF ABILITY TO BRUSH TEETH/COMB HAIR: NORMAL
MYC_MG-ADL-TOTAL_SCORE: 3
DOUBLE_VISION: DAILY, NOT CONSTANT

## 2025-02-19 ASSESSMENT — PAIN SCALES - GENERAL: PAINLEVEL_OUTOF10: NO PAIN (0)

## 2025-02-19 NOTE — NURSING NOTE
Chief Complaint   Patient presents with    Myasthenia Gravis    RECHECK   /83 (BP Location: Right arm, Patient Position: Sitting, Cuff Size: Adult Regular)   Pulse 78   Resp 16   SpO2 96%   Christal Menjivar

## 2025-02-19 NOTE — LETTER
2/19/2025       RE: Leonidas Pratt  [address]  [place] [address]     Dear Colleague,    Thank you for referring your patient, Leonidas Pratt, to the [facility] [place] [facility]. Please see a copy of my visit note below.    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 Onset was around 2010. First symptom was diplopia. About 3 years later ptosis started. No dysarthria, dysphagia, SOB, or weakness. In 2015 he was found to have ACHR ab. In 2015 he was diagnosed with MG and started on prednisone. He started prednisone 60 mg and tapered off over about 3 months. He was off immunotherapy in 2016, and remained off until 12/2018. At that end of 2018 he developed diplopia and restarted prednisone 60 mg daily. He tapered over a few months. By 2/20 he was taking prednisone 5 mg daily. This time prednisone was helpful to alleviate ptosis and diplopia. He remained on low dose prednisone (around 5 mg) through most of 2019. He was stable during that time. In April 2020 he then developed worsening ptosis and diplopia of both eyes. In 4/20 he increased prednisone to 40 mg daily. By June 2020 he was back down to 5 mg. In 9/20 he then experienced new weakness in his left hand. Typing was difficult. He also felt that his legs were weaker. There was a clear drop off in his typical level of activity and exercise. No dysarthria or dysphagia. In 10/20 prednisone increased to 40 mg daily. Prednisone tapered to 10mg daily in 01/21, and 5 mg by 3/21. He underwent thymectomy on 3/22/21. Leading into thymectomy he received IVIG (2g/kg over 3 days from 3/15-17/2021). It was helpful but poorly tolerated (headache, nausea, malaise). In late 2021 and early 2022 he made several attempts to reduce prednisone below 5. By June 2022 he was able to  wean off prednisone but in 1/23 he relapsed. In March 2023 he started vyvgart (series completed 3/28/23). He received three cycles. Vygart was partially helpful, but he was never able to get ADL above 5. In 8/23 he started ultomiris. In 9/23 he tapered off prednisone.     Interval history:   I last saw him in clinic on 8/21/24. At the time of that visit he was receiving ultomiris q 2 months. Hi MG was under fairly good control, but unfortunately in December her developed polyarthritis/joint effusions after the infusion. It started in his left hand MCPs, then PIPs, then DIPs.  About a week later the left hand MCPs, then PIPs, then DIPs became affected as well as the bilateral feet MTPs, wrists and ankles.  He treated it with Prednisone 40 my daily for about 5 days.  This seemed to help, although his myasthenia worsened.  We held off on the 1/17/25 ultomiris infusion Because of the persistent arthralgias ultomiris was stopped and he started IVIG. His insurance would only approve a very low IVIG dose of 0.4 gm/kg q 3 weeks. He had the first IVIG yesterday Presently he has ptosis every day and essentially all day. The left eye is more affected than the right. He has diplopia every day but not all the time. No dysarthria or dysphagia, but over the last week he has developed some facial weakness. He notices this most when playing trombone.  Chewing is ok. Over the last week he has noticed more heaviness in his legs. No shortness of breath. He continues prednisone 5 every other day. Arthralgias persist in his fingers, but are much improved. He takes mestinon usually twice daily.     Prior pertinent laboratory work-up:  9/19: Normal/negative Hba1c, TSH  4/15: ACHR ab binding 3.1 (N<0.4)    Prior electrophysiologic work-up:  12/10: RNS of the right facial nerves showed no abnormal decreament or increment.Single fiber EMG of the right frontalis muscle was subjectively well within the normal range (normal <1.69).  All  individual pairs demonstrated normal jitter ranging from 13 ms to 47.2 ms with normal <53.5 ms in the frontalis.  The mean jitter of the 20 pairs in the right frontalis muscle was 22.9 ms (nl< 35.5ms). There was no transmission blocking. There is no electrophysiological evidence for neuromuscular junction disorder.  10/14/20: NCS/RNS showed unequivocal decrement in facial-nasalis and ulnar-ADM.     Prior imagin/9/17 CT C/A/P showed no mediastinal mass  10/29/20: CT chest showed unchanged tiny soft tissue density in the anterior superior mediastinum, presumably residual thymus. No enlargement to suggest Thymoma.    Past Medical History:   Past Medical History:   Diagnosis Date     Asthma      Kidney stone      Myasthenia gravis (H)      Strabismus      Thyroid disease      Past Surgical History:  Past Surgical History:   Procedure Laterality Date     COLONOSCOPY N/A 2023    Procedure: COLONOSCOPY;  Surgeon: Trevor Perez MD;  Location: [facility] OR     HERNIA REPAIR       LASER HOLMIUM LITHOTRIPSY URETER(S), INSERT STENT, COMBINED Right 10/15/2014    Procedure: COMBINED CYSTOSCOPY, URETEROSCOPY, LASER HOLMIUM LITHOTRIPSY URETER(S), INSERT STENT;  Surgeon: Thong Bates MD;  Location: [facility] OR     RECESSION RESECTION WITH ADJUSTABLE SUTURE  2012    LLRc 5.0mm on adj. LIRc 4.5mm on adj.     RECESSION RESECTION WITH ADJUSTABLE SUTURE BILATERAL  2012    RSRc 3.0mm; adj. suture     STRABISMUS SURGERY       THORACOSCOPIC THYMECTOMY N/A 3/22/2021    Procedure: SUBXIPHOID BILATERAL THORACOSCOPIC THYMECTOMY;  Surgeon: Benjy Hull MD;  Location: [facility] OR     Family history:    There is no known family history of myopathy or other neuromuscular disorders. He does have a sister with spasmodic dysphonia.     Social History:    He denies tobacco, alcohol, or illicit drug use.     Medical Allergies:    Allergies   Allergen Reactions     Nkda [No Known Drug Allergy]      Current Medications:     Current Outpatient Medications   Medication Sig Dispense Refill     albuterol (PROAIR HFA/PROVENTIL HFA/VENTOLIN HFA) 108 (90 Base) MCG/ACT inhaler Inhale 2 puffs into the lungs every 6 hours as needed for shortness of breath or wheezing 18 g 1     atorvastatin (LIPITOR) 20 MG tablet Take 1 tablet (20 mg) by mouth daily. 90 tablet 3     immune globulin - sucrose free 10% (PRIVIGEN) 35 g 350 mL via CADD pump Infuse 35 g into the vein every 21 days. Continuous rate: 40 mL/hr (0.5ml/kg/hr) x15 min, 80 mL/hr x15 min, 120 mL/hr x15 min, 160 mL/hr x15 min, 200 mL/hr x15 min, 240 mL/hr x15 min, 280 mL/hr x15 min, 320 mL/hr (4ml/kg/hr) until infusion complete. Flush bag with 20 mL NS to flush tubing.  Duration: ~ 2 hours 101171 mL 0     levothyroxine (SYNTHROID/LEVOTHROID) 137 MCG tablet Take 1 tablet (137 mcg) by mouth every morning (before breakfast). 90 tablet 3     predniSONE (DELTASONE) 5 MG tablet Take 1 tablet (5 mg) by mouth daily 60 tablet 3     pyRIDostigmine (MESTINON) 60 MG tablet TAKE ONE TABLET BY MOUTH 2 TO 3 TIMES A DAY. 90 tablet 1     pyRIDostigmine (MESTINON) 60 MG tablet TAKE ONE TABLET BY MOUTH 2 TO 3 TIMES A DAY. 90 tablet 1     Vitamin D, Cholecalciferol, 25 MCG (1000 UT) TABS Take by mouth every morning       acetaminophen (TYLENOL) 325 MG tablet Take 2 tablets (650 mg) by mouth every 21 days. Administer 30 minutes prior to infusion (Patient not taking: Reported on 2/19/2025) 32 tablet 0     apraclonidine (IOPIDINE) 0.5 % ophthalmic solution Place 1 drop into both eyes 3 times daily as needed (ptosis) For additional refills, please schedule a follow-up appointment at [phone]. 5 mL 0     diphenhydrAMINE (BENADRYL) 25 MG capsule Take 2 capsules (50 mg) by mouth every 21 days. Administer 30 minutes prior to infusion (Patient not taking: Reported on 2/19/2025) 32 capsule 0     hydrocortisone sodium succinate PF (SOLU-CORTEF) 50 mg/mL injection Inject 2 mLs (100 mg) over 5-10 minutes into the vein  via push every 21 days. Administer 30 minutes prior to infusion (Patient not taking: Reported on 2/19/2025) 32 mL 0     levothyroxine (SYNTHROID/LEVOTHROID) 125 MCG tablet Take 1 tablet (125 mcg) by mouth daily. **Overdue for visit and labs, needed for further refills. Please schedule via [facility] or call the clinic** (Patient not taking: Reported on 2/19/2025) 90 tablet 0     levothyroxine (SYNTHROID/LEVOTHROID) 137 MCG tablet Take 1 tablet (137 mcg) by mouth daily 90 tablet 3     levothyroxine (SYNTHROID/LEVOTHROID) 150 MCG tablet Take 1 tablet (150 mcg) by mouth daily 90 tablet 3     polyethylene glycol (GOLYTELY) 236 g suspension The night before the exam at 6 pm drink an 8-ounce glass every 15 minutes until the jug is half empty. If you arrive before 11 AM: Drink the other half of the Golytely jug at 11 PM night before procedure. If you arrive after 11 AM: Drink the other half of the Golytely jug at 6 AM day of procedure. For additional instructions refer to your colonoscopy prep instructions. 4000 mL 0     predniSONE (DELTASONE) 1 MG tablet Take 1 tablet (1 mg) by mouth See Admin Instructions Take 1 to 4 tablets by mouth daily as directed. 120 tablet 3     predniSONE (DELTASONE) 10 MG tablet Take 2 tablets (20 mg) by mouth daily 60 tablet 1     predniSONE (DELTASONE) 5 MG tablet Take 1 tablet (5 mg) by mouth See Admin Instructions 60 tablet 3     predniSONE (DELTASONE) 5 MG tablet Take 1 tablet (5 mg) by mouth daily 30 tablet 5     predniSONE (DELTASONE) 5 MG tablet Take one tablet daily 30 tablet 2     pyridostigmine (MESTINON) 60 MG tablet TAKE ONE TABLET BY MOUTH THREE TIMES A DAY 90 tablet 3     sodium chloride, PF, 0.9% PF flush Inject 10 mLs into the vein as needed for line flush. Flush IV before and after medication administration as directed and/or at least every 12 hours. (Patient not taking: Reported on 2/19/2025) 579703 mL 0     No current facility-administered medications for this visit.     Review  of Systems: A review of systems was obtained and was negative except for what was noted above.    Physical examination:    /83 (BP Location: Right arm, Patient Position: Sitting, Cuff Size: Adult Regular)   Pulse 78   Resp 16   SpO2 96%     General Appearance: NAD     Skin: There are no rashes or other skin lesions.     Neurologic examination:     Mental status:  Patient is alert, attentive, and oriented x 3.  Language is coherent and fluent without aphasia.  Memory, comprehension and ability to follow commands were intact.        Cranial nerves:  Left eyelid ptosis at baseline. With upgaze left eyelid ptosis worsens. No diplopia at mid position, but present immediately with lateral gaze. Symmetric smile and full. No dysarthria.      Motor exam: No atrophy or fasciculations. Manual muscle testing revealed the following MRC grade muscle power:    Right Left   Neck flexion 5 5   Neck extension: 5 5   Shoulder ext rotation 5 5   Shoulder abduction:        5 5   Elbow extension: 5 5   Elbow flexion:            5 5   Wrist flexion:            5 5   Wrist extension:        5 5   Finger flexion 5 5   Finger extension 5 5   FDI 5 5   Hip flexion 5 5   Knee flexion 5 5   Knee extension 5 5   Dorsiflexion 5 5   Plantar flexion 5 5   Red indicates worse when compared to last examination  Green indicates improved when compared to last examination     MG Outcomes MG-ADL MG-Composite  strength (kg) MG medications   10/12/20 7 9 Right 35, Left 31 Pred 5 mg daily   11/18/20 5 6 Right 40, Left 39 Pred 25 mg daily   1/20/21 4 2 Right 41, Left 41 Prednisone 10mg daily   3/22/21 xxx xxx xxx Thymectomy   3/31/21   5 9 Right 44, Left 46 IVIG prior to thymectomy 2g/kg over 3 days (03/15-17/2021),  Prednisone 5mg daily   7/14/21 3 4 Right 43, Left 44 Pred 5 mg   3/14/22 3 Not tested (video) Not tested (video) Pred 5 mg   6/22 xxx xxx xxx Weaned off prednisone   2/8/23 7 8 Right 32 kg, left 36 Kg None (pred 20 mg started just 2  days ago)   5/3/23 5 6 Right 32 kg, left 35 kg Vyvgart series (4 weekly infusions) completed 3/28/23   7/19/23 5 6 Right 36 kg, left 33 kg Vyvgart series (currently in between infusion 2 and 3 on 3rd vyvgart cycle), pred 5 mg daily   9/20/23 3 3 Right 38 kg, Left 37 kg Ultomiris started 8/18/23, Pred 5 mg   2/7/24 3 3 Right 38 kg, left 38 kg Ultomiris started 8/18/23, receiving q 2 months   8/21/24 2 3 Right 39 kg, Left 42 kg Ultomiris started 8/18/23, receiving q 2 months; pred 5 mg qOD   11/22/24 xxx xxx xxx Last ultomiris infusion    2/19/25 6 7 Right 40, Left 41 kg IVIG 0.4 gm/kg q 3 weeks (first dose started just 1 day ago), pred 5 qOD     Assessment:    Leonidas Pratt is a 59 year old man with ACHR ab positive generalized non-thymomatous myasthenia gravis s/p thymectomy in 3/2021. Although he was doing reasonably well with ultomiris, he developed fairly severe arthralgias which precluded further infusions (this side effect was reported in about 10 of those that took ultomiris). Considering he previously tried and failed medications, we elected to go with IVIG as a next step. His insurance would only approve 0.4 gm/kg q 3 weeks. Will proceed as below to see how much benefit he can achieve. If inadequate, then next options may be a return to vyvgart subcutaneous (if/when self injection is available) or a b cell depletor. I am not very enthusiastic about rituximab, however recently released data from the MINT trial with inebilizumab is very encouraging. Will keep that in mind as we move forward.     Plan:      1. Immunotherapy:   - Prednisone: Continue 5 mg every other day.   - Vyvgart: Suboptimally beneficial after 3 cycles between 2/23 and 7/23. Discontinued 7/23  - Ultomiris: Started 8/23. Stopped 11/24 due to arthralgias.   - IVIG: Started 2/25 at dose of 0.4 gm/kg q 3 weeks. Will assess benefit over the next 3-4 cycles. If beneficial and tolerated will continue. If not beneficial will advocate for increased  IVIG dose vs switch back to vyvgart subcutaneous if self administration available  2. Thymectomy: Completed 3/22/21.   3. Supportive care:  - Mestinon: Continue 60 mg BID to TID as needed  4. Potential MG triggers including heat, surgery, and illness. Certain medications and over the counter preparations may also cause worsening of MG symptoms. A list of cautionary medications can be found at: [email or web address]-[email or web address]-Drugs  5. Disclosures discussed and in AVS  6. Follow up 6 months. Sooner if needed.    -----      Again, thank you for allowing me to participate in the care of your patient.      Sincerely,    Rj Jackson MD

## 2025-02-19 NOTE — PROGRESS NOTES
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 Onset was around 2010. First symptom was diplopia. About 3 years later ptosis started. No dysarthria, dysphagia, SOB, or weakness. In 2015 he was found to have ACHR ab. In 2015 he was diagnosed with MG and started on prednisone. He started prednisone 60 mg and tapered off over about 3 months. He was off immunotherapy in 2016, and remained off until 12/2018. At that end of 2018 he developed diplopia and restarted prednisone 60 mg daily. He tapered over a few months. By 2/20 he was taking prednisone 5 mg daily. This time prednisone was helpful to alleviate ptosis and diplopia. He remained on low dose prednisone (around 5 mg) through most of 2019. He was stable during that time. In April 2020 he then developed worsening ptosis and diplopia of both eyes. In 4/20 he increased prednisone to 40 mg daily. By June 2020 he was back down to 5 mg. In 9/20 he then experienced new weakness in his left hand. Typing was difficult. He also felt that his legs were weaker. There was a clear drop off in his typical level of activity and exercise. No dysarthria or dysphagia. In 10/20 prednisone increased to 40 mg daily. Prednisone tapered to 10mg daily in 01/21, and 5 mg by 3/21. He underwent thymectomy on 3/22/21. Leading into thymectomy he received IVIG (2g/kg over 3 days from 3/15-17/2021). It was helpful but poorly tolerated (headache, nausea, malaise). In late 2021 and early 2022 he made several attempts to reduce prednisone below 5. By June 2022 he was able to wean off prednisone but in 1/23 he relapsed. In March 2023 he started vyvgart (series completed 3/28/23). He received three cycles. Vygart was partially helpful, but he was never able to get ADL above 5. In 8/23 he started ultomiris. In 9/23 he tapered off prednisone.     Interval history:   I last saw him in clinic on  8/21/24. At the time of that visit he was receiving ultomiris q 2 months. Hi MG was under fairly good control, but unfortunately in December her developed polyarthritis/joint effusions after the infusion. It started in his left hand MCPs, then PIPs, then DIPs.  About a week later the left hand MCPs, then PIPs, then DIPs became affected as well as the bilateral feet MTPs, wrists and ankles.  He treated it with Prednisone 40 my daily for about 5 days.  This seemed to help, although his myasthenia worsened.  We held off on the 1/17/25 ultomiris infusion Because of the persistent arthralgias ultomiris was stopped and he started IVIG. His insurance would only approve a very low IVIG dose of 0.4 gm/kg q 3 weeks. He had the first IVIG yesterday Presently he has ptosis every day and essentially all day. The left eye is more affected than the right. He has diplopia every day but not all the time. No dysarthria or dysphagia, but over the last week he has developed some facial weakness. He notices this most when playing trombone.  Chewing is ok. Over the last week he has noticed more heaviness in his legs. No shortness of breath. He continues prednisone 5 every other day. Arthralgias persist in his fingers, but are much improved. He takes mestinon usually twice daily.     Prior pertinent laboratory work-up:  9/19: Normal/negative Hba1c, TSH  4/15: ACHR ab binding 3.1 (N<0.4)    Prior electrophysiologic work-up:  12/10: RNS of the right facial nerves showed no abnormal decreament or increment.Single fiber EMG of the right frontalis muscle was subjectively well within the normal range (normal <1.69).  All individual pairs demonstrated normal jitter ranging from 13 ms to 47.2 ms with normal <53.5 ms in the frontalis.  The mean jitter of the 20 pairs in the right frontalis muscle was 22.9 ms (nl< 35.5ms). There was no transmission blocking. There is no electrophysiological evidence for neuromuscular junction disorder.  10/14/20:  NCS/RNS showed unequivocal decrement in facial-nasalis and ulnar-ADM.     Prior imagin/9/17 CT C/A/P showed no mediastinal mass  10/29/20: CT chest showed unchanged tiny soft tissue density in the anterior superior mediastinum, presumably residual thymus. No enlargement to suggest Thymoma.    Past Medical History:   Past Medical History:   Diagnosis Date    Asthma     Kidney stone     Myasthenia gravis (H)     Strabismus     Thyroid disease      Past Surgical History:  Past Surgical History:   Procedure Laterality Date    COLONOSCOPY N/A 2023    Procedure: COLONOSCOPY;  Surgeon: Trevor Perez MD;  Location: [facility] OR    HERNIA REPAIR      LASER HOLMIUM LITHOTRIPSY URETER(S), INSERT STENT, COMBINED Right 10/15/2014    Procedure: COMBINED CYSTOSCOPY, URETEROSCOPY, LASER HOLMIUM LITHOTRIPSY URETER(S), INSERT STENT;  Surgeon: Thong Bates MD;  Location: [facility] OR    RECESSION RESECTION WITH ADJUSTABLE SUTURE  2012    LLRc 5.0mm on adj. LIRc 4.5mm on adj.    RECESSION RESECTION WITH ADJUSTABLE SUTURE BILATERAL  2012    RSRc 3.0mm; adj. suture    STRABISMUS SURGERY      THORACOSCOPIC THYMECTOMY N/A 3/22/2021    Procedure: SUBXIPHOID BILATERAL THORACOSCOPIC THYMECTOMY;  Surgeon: Benjy Hull MD;  Location: [facility] OR     Family history:    There is no known family history of myopathy or other neuromuscular disorders. He does have a sister with spasmodic dysphonia.     Social History:    He denies tobacco, alcohol, or illicit drug use.     Medical Allergies:    Allergies   Allergen Reactions    Nkda [No Known Drug Allergy]      Current Medications:    Current Outpatient Medications   Medication Sig Dispense Refill    albuterol (PROAIR HFA/PROVENTIL HFA/VENTOLIN HFA) 108 (90 Base) MCG/ACT inhaler Inhale 2 puffs into the lungs every 6 hours as needed for shortness of breath or wheezing 18 g 1    atorvastatin (LIPITOR) 20 MG tablet Take 1 tablet (20 mg) by mouth daily. 90 tablet 3     immune globulin - sucrose free 10% (PRIVIGEN) 35 g 350 mL via CADD pump Infuse 35 g into the vein every 21 days. Continuous rate: 40 mL/hr (0.5ml/kg/hr) x15 min, 80 mL/hr x15 min, 120 mL/hr x15 min, 160 mL/hr x15 min, 200 mL/hr x15 min, 240 mL/hr x15 min, 280 mL/hr x15 min, 320 mL/hr (4ml/kg/hr) until infusion complete. Flush bag with 20 mL NS to flush tubing.  Duration: ~ 2 hours [id number] mL 0    levothyroxine (SYNTHROID/LEVOTHROID) 137 MCG tablet Take 1 tablet (137 mcg) by mouth every morning (before breakfast). 90 tablet 3    predniSONE (DELTASONE) 5 MG tablet Take 1 tablet (5 mg) by mouth daily 60 tablet 3    pyRIDostigmine (MESTINON) 60 MG tablet TAKE ONE TABLET BY MOUTH 2 TO 3 TIMES A DAY. 90 tablet 1    pyRIDostigmine (MESTINON) 60 MG tablet TAKE ONE TABLET BY MOUTH 2 TO 3 TIMES A DAY. 90 tablet 1    Vitamin D, Cholecalciferol, 25 MCG (1000 UT) TABS Take by mouth every morning      acetaminophen (TYLENOL) 325 MG tablet Take 2 tablets (650 mg) by mouth every 21 days. Administer 30 minutes prior to infusion (Patient not taking: Reported on 2/19/2025) 32 tablet 0    apraclonidine (IOPIDINE) 0.5 % ophthalmic solution Place 1 drop into both eyes 3 times daily as needed (ptosis) For additional refills, please schedule a follow-up appointment at [phone]. 5 mL 0    diphenhydrAMINE (BENADRYL) 25 MG capsule Take 2 capsules (50 mg) by mouth every 21 days. Administer 30 minutes prior to infusion (Patient not taking: Reported on 2/19/2025) 32 capsule 0    hydrocortisone sodium succinate PF (SOLU-CORTEF) 50 mg/mL injection Inject 2 mLs (100 mg) over 5-10 minutes into the vein via push every 21 days. Administer 30 minutes prior to infusion (Patient not taking: Reported on 2/19/2025) 32 mL 0    levothyroxine (SYNTHROID/LEVOTHROID) 125 MCG tablet Take 1 tablet (125 mcg) by mouth daily. **Overdue for visit and labs, needed for further refills. Please schedule via [facility] or call the clinic** (Patient not taking: Reported on  2/19/2025) 90 tablet 0    levothyroxine (SYNTHROID/LEVOTHROID) 137 MCG tablet Take 1 tablet (137 mcg) by mouth daily 90 tablet 3    levothyroxine (SYNTHROID/LEVOTHROID) 150 MCG tablet Take 1 tablet (150 mcg) by mouth daily 90 tablet 3    polyethylene glycol (GOLYTELY) 236 g suspension The night before the exam at 6 pm drink an 8-ounce glass every 15 minutes until the jug is half empty. If you arrive before 11 AM: Drink the other half of the Golytely jug at 11 PM night before procedure. If you arrive after 11 AM: Drink the other half of the Golytely jug at 6 AM day of procedure. For additional instructions refer to your colonoscopy prep instructions. 4000 mL 0    predniSONE (DELTASONE) 1 MG tablet Take 1 tablet (1 mg) by mouth See Admin Instructions Take 1 to 4 tablets by mouth daily as directed. 120 tablet 3    predniSONE (DELTASONE) 10 MG tablet Take 2 tablets (20 mg) by mouth daily 60 tablet 1    predniSONE (DELTASONE) 5 MG tablet Take 1 tablet (5 mg) by mouth See Admin Instructions 60 tablet 3    predniSONE (DELTASONE) 5 MG tablet Take 1 tablet (5 mg) by mouth daily 30 tablet 5    predniSONE (DELTASONE) 5 MG tablet Take one tablet daily 30 tablet 2    pyridostigmine (MESTINON) 60 MG tablet TAKE ONE TABLET BY MOUTH THREE TIMES A DAY 90 tablet 3    sodium chloride, PF, 0.9% PF flush Inject 10 mLs into the vein as needed for line flush. Flush IV before and after medication administration as directed and/or at least every 12 hours. (Patient not taking: Reported on 2/19/2025) [id number] mL 0     No current facility-administered medications for this visit.     Review of Systems: A review of systems was obtained and was negative except for what was noted above.    Physical examination:    /83 (BP Location: Right arm, Patient Position: Sitting, Cuff Size: Adult Regular)   Pulse 78   Resp 16   SpO2 96%     General Appearance: NAD     Skin: There are no rashes or other skin lesions.     Neurologic examination:      Mental status:  Patient is alert, attentive, and oriented x 3.  Language is coherent and fluent without aphasia.  Memory, comprehension and ability to follow commands were intact.        Cranial nerves:  Left eyelid ptosis at baseline. With upgaze left eyelid ptosis worsens. No diplopia at mid position, but present immediately with lateral gaze. Symmetric smile and full. No dysarthria.      Motor exam: No atrophy or fasciculations. Manual muscle testing revealed the following MRC grade muscle power:    Right Left   Neck flexion 5 5   Neck extension: 5 5   Shoulder ext rotation 5 5   Shoulder abduction:        5 5   Elbow extension: 5 5   Elbow flexion:            5 5   Wrist flexion:            5 5   Wrist extension:        5 5   Finger flexion 5 5   Finger extension 5 5   FDI 5 5   Hip flexion 5 5   Knee flexion 5 5   Knee extension 5 5   Dorsiflexion 5 5   Plantar flexion 5 5   Red indicates worse when compared to last examination  Green indicates improved when compared to last examination     MG Outcomes MG-ADL MG-Composite  strength (kg) MG medications   10/12/20 7 9 Right 35, Left 31 Pred 5 mg daily   11/18/20 5 6 Right 40, Left 39 Pred 25 mg daily   1/20/21 4 2 Right 41, Left 41 Prednisone 10mg daily   3/22/21 xxx xxx xxx Thymectomy   3/31/21   5 9 Right 44, Left 46 IVIG prior to thymectomy 2g/kg over 3 days (03/15-17/2021),  Prednisone 5mg daily   7/14/21 3 4 Right 43, Left 44 Pred 5 mg   3/14/22 3 Not tested (video) Not tested (video) Pred 5 mg   6/22 xxx xxx xxx Weaned off prednisone   2/8/23 7 8 Right 32 kg, left 36 Kg None (pred 20 mg started just 2 days ago)   5/3/23 5 6 Right 32 kg, left 35 kg Vyvgart series (4 weekly infusions) completed 3/28/23   7/19/23 5 6 Right 36 kg, left 33 kg Vyvgart series (currently in between infusion 2 and 3 on 3rd vyvgart cycle), pred 5 mg daily   9/20/23 3 3 Right 38 kg, Left 37 kg Ultomiris started 8/18/23, Pred 5 mg   2/7/24 3 3 Right 38 kg, left 38 kg Ultomiris  started 8/18/23, receiving q 2 months   8/21/24 2 3 Right 39 kg, Left 42 kg Ultomiris started 8/18/23, receiving q 2 months; pred 5 mg qOD   11/22/24 xxx xxx xxx Last ultomiris infusion    2/19/25 6 7 Right 40, Left 41 kg IVIG 0.4 gm/kg q 3 weeks (first dose started just 1 day ago), pred 5 qOD     Assessment:    Leonidas Pratt is a 59 year old man with ACHR ab positive generalized non-thymomatous myasthenia gravis s/p thymectomy in 3/2021. Although he was doing reasonably well with ultomiris, he developed fairly severe arthralgias which precluded further infusions (this side effect was reported in about 10 of those that took ultomiris). Considering he previously tried and failed medications, we elected to go with IVIG as a next step. His insurance would only approve 0.4 gm/kg q 3 weeks. Will proceed as below to see how much benefit he can achieve. If inadequate, then next options may be a return to vyvgart subcutaneous (if/when self injection is available) or a b cell depletor. I am not very enthusiastic about rituximab, however recently released data from the MINT trial with inebilizumab is very encouraging. Will keep that in mind as we move forward.     Plan:      1. Immunotherapy:   - Prednisone: Continue 5 mg every other day.   - Vyvgart: Suboptimally beneficial after 3 cycles between 2/23 and 7/23. Discontinued 7/23  - Ultomiris: Started 8/23. Stopped 11/24 due to arthralgias.   - IVIG: Started 2/25 at dose of 0.4 gm/kg q 3 weeks. Will assess benefit over the next 3-4 cycles. If beneficial and tolerated will continue. If not beneficial will advocate for increased IVIG dose vs switch back to vyvgart subcutaneous if self administration available  2. Thymectomy: Completed 3/22/21.   3. Supportive care:  - Mestinon: Continue 60 mg BID to TID as needed  4. Potential MG triggers including heat, surgery, and illness. Certain medications and over the counter preparations may also cause worsening of MG symptoms. A list  of cautionary medications can be found at: [email or web address]-MG/[email or web address]-Drugs  5. Disclosures discussed and in AVS  6. Follow up 6 months. Sooner if needed.    -----

## 2025-03-11 ENCOUNTER — HOME CARE VISIT (OUTPATIENT)
Dept: HOME HEALTH SERVICES | Facility: HOME HEALTH | Age: 60
End: 2025-03-11
Payer: COMMERCIAL

## 2025-03-11 ENCOUNTER — HOME INFUSION BILLING (OUTPATIENT)
Dept: HOME HEALTH SERVICES | Facility: HOME HEALTH | Age: 60
End: 2025-03-11
Payer: COMMERCIAL

## 2025-03-11 VITALS
HEART RATE: 70 BPM | TEMPERATURE: 97.8 F | OXYGEN SATURATION: 97 % | BODY MASS INDEX: 27.93 KG/M2 | SYSTOLIC BLOOD PRESSURE: 126 MMHG | WEIGHT: 181 LBS | DIASTOLIC BLOOD PRESSURE: 82 MMHG | RESPIRATION RATE: 16 BRPM

## 2025-03-11 PROCEDURE — S1015 IV TUBING EXTENSION SET: HCPCS

## 2025-03-11 PROCEDURE — 99602 HOME NFS VISIT EACH ADDL HR: CPT | Mod: SS

## 2025-03-11 PROCEDURE — A4215 STERILE NEEDLE: HCPCS

## 2025-03-11 PROCEDURE — 99601 HOME NFS VISIT <2 HRS: CPT | Mod: SS

## 2025-03-11 NOTE — PROGRESS NOTES
Nursing Visit Note:  Nurse visit today for Roberto for Leonidas Pratt.     present during visit today: Not Applicable.    Intravenous Access:  Peripheral IV placed.    Infusion Nursing Note:    Pre-infusion Checklist:   Have you had any delayed reaction since last infusion?   No     Have you recently had an elevated temperature, fever, chills productive cough, coughing for 3 weeks or longer or hemoptysis, abnormal vital signs, night sweats, chest pain, or have you noticed a decrease in your appetite, or noted unexplained weight loss or fatigue?   No     Do you have any open wounds or new incisions?  No     Do you have any recent or upcoming hospitalizations, surgeries, or dental procedures? Does not include esophagogastroduodenoscopy, colonoscopy, endoscopic retrograde cholangiopancreatography (ERCP), endoscopic ultrasound (EUS), dental procedures or joint aspiration/steroid injections.   No     Do you currently have or recently have had any signs of illness or infection or are you on any antibiotics?   No     Have you had any new, sudden, or worsening abdominal pain?   No     Have you or anyone in your household received a live vaccination in the past 4 weeks?   No     Have you recently been diagnosed with any new nervous system diseases or cancer diagnosis? (i.e., Multiple Sclerosis, Guillain [place], seizures, neurological changes) Are you receiving any form of radiation or chemotherapy?   No     Are you pregnant or breastfeeding, or do you have plans of pregnancy in the future?   No     Have you been having any signs of worsening depression or suicidal ideation?  No     Have you had any other new onset medical symptoms?  No    Entyvio/Ocrevus/Tyasabri only: Have you been having any new or worsening medical problems such as issues with thinking, eyesight, balance or strength that have persisted over several days?   N/A    Benlysta only: Have you been having any signs of worsening depression or  "suicidal ideations?    N/A    IVIG only: Have you had any new blood clots?  No    Did the patient answer \"YES\" to any of the questions above?  No     Will the patient receive a medication that has an order for infusion reaction management?  Yes, and all drugs and supplies are available and none have .     If ordered, has the patient taken pre-medications?  Yes    Plan:   Therapy is appropriate, will proceed with treatment.     Post Infusion Assessment:  Patient tolerated infusion without incident.  Blood return noted pre and post infusion.  Access discontinued per protocol.       Note: Patient has been have double vision, eyelid droop and muscle weakness with his myasthenia gravis. Has been on Vyvgart and Ultomiris but switched to receiving privigen last month. Since last Privigen infusion he feels his symptoms have gotten a little better but he still been dealing with them.     PIV was placed. 10 mL normal saline used.     Pre-medication given:  650 mg acetaminophen by mouth at 1114   50 mg diphenhydramine by mouth at 1114        Saline administered: 10 (ml)    Supply Check:   Does the patient have all the supplies they need for the next visit?  Yes    Next visit plan:  at 11:00am    Berenice Ellsworth RN 3/11/2025  "

## 2025-03-13 ENCOUNTER — HOME INFUSION (OUTPATIENT)
Dept: HOME HEALTH SERVICES | Facility: HOME HEALTH | Age: 60
End: 2025-03-13
Payer: COMMERCIAL

## 2025-03-18 PROCEDURE — E0781 EXTERNAL AMBULATORY INFUS PU: HCPCS | Mod: RR

## 2025-04-07 ENCOUNTER — HOME INFUSION BILLING (OUTPATIENT)
Dept: HOME HEALTH SERVICES | Facility: HOME HEALTH | Age: 60
End: 2025-04-07
Payer: COMMERCIAL

## 2025-04-08 ENCOUNTER — HOME CARE VISIT (OUTPATIENT)
Dept: HOME HEALTH SERVICES | Facility: HOME HEALTH | Age: 60
End: 2025-04-08
Payer: COMMERCIAL

## 2025-04-08 VITALS
SYSTOLIC BLOOD PRESSURE: 137 MMHG | DIASTOLIC BLOOD PRESSURE: 91 MMHG | OXYGEN SATURATION: 96 % | TEMPERATURE: 97.3 F | HEART RATE: 71 BPM | RESPIRATION RATE: 16 BRPM

## 2025-04-08 PROCEDURE — 99602 HOME NFS VISIT EACH ADDL HR: CPT | Mod: SS

## 2025-04-08 PROCEDURE — S9338 HIT IMMUNOTHERAPY DIEM: HCPCS

## 2025-04-08 PROCEDURE — 99601 HOME NFS VISIT <2 HRS: CPT | Mod: SS

## 2025-04-08 PROCEDURE — S1015 IV TUBING EXTENSION SET: HCPCS

## 2025-04-08 PROCEDURE — A4215 STERILE NEEDLE: HCPCS

## 2025-04-08 ASSESSMENT — PAIN SCALES - GENERAL: PAINLEVEL: NO PAIN (0)

## 2025-04-08 NOTE — PROGRESS NOTES
Nursing Visit Note:  Nurse visit today for Privigen 35GM for Leonidas Pratt.     present during visit today: Not Applicable.    Intravenous Access:  Peripheral IV placed.    Infusion Nursing Note:    Pre-infusion Checklist:   Have you had any delayed reaction since last infusion?   No     Have you recently had an elevated temperature, fever, chills productive cough, coughing for 3 weeks or longer or hemoptysis, abnormal vital signs, night sweats, chest pain, or have you noticed a decrease in your appetite, or noted unexplained weight loss or fatigue?   No     Do you have any open wounds or new incisions?  No     Do you have any recent or upcoming hospitalizations, surgeries, or dental procedures? Does not include esophagogastroduodenoscopy, colonoscopy, endoscopic retrograde cholangiopancreatography (ERCP), endoscopic ultrasound (EUS), dental procedures or joint aspiration/steroid injections.   No     Do you currently have or recently have had any signs of illness or infection or are you on any antibiotics?   No     Have you had any new, sudden, or worsening abdominal pain?   No     Have you or anyone in your household received a live vaccination in the past 4 weeks?   No     Have you recently been diagnosed with any new nervous system diseases or cancer diagnosis? (i.e., Multiple Sclerosis, Guillain [place], seizures, neurological changes) Are you receiving any form of radiation or chemotherapy?   No     Are you pregnant or breastfeeding, or do you have plans of pregnancy in the future?   No     Have you been having any signs of worsening depression or suicidal ideation?  No     Have you had any other new onset medical symptoms?  No    Entyvio/Ocrevus/Tyasabri only: Have you been having any new or worsening medical problems such as issues with thinking, eyesight, balance or strength that have persisted over several days?   N/A    Benlysta only: Have you been having any signs of worsening depression or  "suicidal ideations?    N/A    IVIG only: Have you had any new blood clots?  No    Did the patient answer \"YES\" to any of the questions above?  No     Will the patient receive a medication that has an order for infusion reaction management?  Yes, and all drugs and supplies are available and none have .     If ordered, has the patient taken pre-medications?  Yes    1110 Acetaminophen 650mg and Diphenhydramine 50mg po    Plan:   Therapy is appropriate, will proceed with treatment.     Post Infusion Assessment:  Patient tolerated infusion without incident.     Note: Pt reports that his baseline sx are double vision, eye lid droop, muscle weakness.  Denies falls and he says the muscles in his legs and face are affected first.  Pt states he isnt sure yet if the Privigen is helping    Saline administered: 20 (ml)    Next visit plan: 25 1100    Melina Taylor RN 2025  "

## 2025-04-10 DIAGNOSIS — G70.00 MYASTHENIA GRAVIS WITHOUT EXACERBATION (H): ICD-10-CM

## 2025-04-10 RX ORDER — PREDNISONE 5 MG/1
5 TABLET ORAL DAILY
Qty: 60 TABLET | Refills: 3 | Status: SHIPPED | OUTPATIENT
Start: 2025-04-10

## 2025-04-18 PROCEDURE — E0781 EXTERNAL AMBULATORY INFUS PU: HCPCS | Mod: RR

## 2025-04-21 ENCOUNTER — HOME INFUSION (OUTPATIENT)
Dept: HOME HEALTH SERVICES | Facility: HOME HEALTH | Age: 60
End: 2025-04-21
Payer: COMMERCIAL

## 2025-04-21 DIAGNOSIS — G70.00 MYASTHENIA GRAVIS WITHOUT EXACERBATION (H): Primary | ICD-10-CM

## 2025-04-28 ENCOUNTER — HOME INFUSION BILLING (OUTPATIENT)
Dept: HOME HEALTH SERVICES | Facility: HOME HEALTH | Age: 60
End: 2025-04-28
Payer: COMMERCIAL

## 2025-04-29 ENCOUNTER — HOME INFUSION BILLING (OUTPATIENT)
Dept: HOME HEALTH SERVICES | Facility: HOME HEALTH | Age: 60
End: 2025-04-29
Payer: COMMERCIAL

## 2025-04-29 PROCEDURE — S9338 HIT IMMUNOTHERAPY DIEM: HCPCS

## 2025-04-30 PROCEDURE — S1015 IV TUBING EXTENSION SET: HCPCS

## 2025-05-01 ENCOUNTER — HOME INFUSION (OUTPATIENT)
Dept: HOME HEALTH SERVICES | Facility: HOME HEALTH | Age: 60
End: 2025-05-01
Payer: COMMERCIAL

## 2025-05-19 ENCOUNTER — HOME INFUSION BILLING (OUTPATIENT)
Dept: HOME HEALTH SERVICES | Facility: HOME HEALTH | Age: 60
End: 2025-05-19
Payer: COMMERCIAL

## 2025-05-20 ENCOUNTER — HOME CARE VISIT (OUTPATIENT)
Dept: HOME HEALTH SERVICES | Facility: HOME HEALTH | Age: 60
End: 2025-05-20
Payer: COMMERCIAL

## 2025-05-20 VITALS
WEIGHT: 175 LBS | SYSTOLIC BLOOD PRESSURE: 147 MMHG | OXYGEN SATURATION: 98 % | TEMPERATURE: 97.3 F | DIASTOLIC BLOOD PRESSURE: 83 MMHG | RESPIRATION RATE: 16 BRPM | HEART RATE: 70 BPM | BODY MASS INDEX: 27 KG/M2

## 2025-05-20 PROCEDURE — S1015 IV TUBING EXTENSION SET: HCPCS

## 2025-05-20 PROCEDURE — S9338 HIT IMMUNOTHERAPY DIEM: HCPCS

## 2025-05-20 PROCEDURE — 99602 HOME NFS VISIT EACH ADDL HR: CPT | Mod: SS

## 2025-05-20 PROCEDURE — 99601 HOME NFS VISIT <2 HRS: CPT | Mod: SS

## 2025-05-20 NOTE — PROGRESS NOTES
Nursing Visit Note:  Nurse visit today for patrick  for Leonidas Pratt.     present during visit today: Not Applicable.    Intravenous Access:  Peripheral IV placed.    Infusion Nursing Note:    Pre-infusion Checklist:   Have you had any delayed reaction since last infusion?   No     Have you recently had an elevated temperature, fever, chills productive cough, coughing for 3 weeks or longer or hemoptysis, abnormal vital signs, night sweats, chest pain, or have you noticed a decrease in your appetite, or noted unexplained weight loss or fatigue?   No     Do you have any open wounds or new incisions?  No     Do you have any recent or upcoming hospitalizations, surgeries, or dental procedures? Does not include esophagogastroduodenoscopy, colonoscopy, endoscopic retrograde cholangiopancreatography (ERCP), endoscopic ultrasound (EUS), dental procedures or joint aspiration/steroid injections.   No     Do you currently have or recently have had any signs of illness or infection or are you on any antibiotics?   No     Have you had any new, sudden, or worsening abdominal pain?   No     Have you or anyone in your household received a live vaccination in the past 4 weeks?   No     Have you recently been diagnosed with any new nervous system diseases or cancer diagnosis? (i.e., Multiple Sclerosis, Guillain [place], seizures, neurological changes) Are you receiving any form of radiation or chemotherapy?   No     Are you pregnant or breastfeeding, or do you have plans of pregnancy in the future?   No     Have you been having any signs of worsening depression or suicidal ideation?  No     Have you had any other new onset medical symptoms?  No    Entyvio/Ocrevus/Tyasabri only: Have you been having any new or worsening medical problems such as issues with thinking, eyesight, balance or strength that have persisted over several days?   N/A    Benlysta only: Have you been having any signs of worsening depression or  "suicidal ideations?    N/A    IVIG only: Have you had any new blood clots?  No    Did the patient answer \"YES\" to any of the questions above?  No     Will the patient receive a medication that has an order for infusion reaction management?  Yes, and all drugs and supplies are available and none have .     If ordered, has the patient taken pre-medications?  Yes    Plan:   Therapy is appropriate, will proceed with treatment.     Post Infusion Assessment:  Patient tolerated infusion without incident.  Access discontinued per protocol.       Note: Pt continues to have double vision and muscle weakness. Feels that privigen may be keeping symptoms \"stable\". AVSS. Denies any new health concerns. Infusion tolerated well. Difficult PIV start. Was successful after 5 attempts.     PREMEDS:  650mg tylenol and 50mg benadryl given po at 1152    Saline administered: 20 (ml)    Supply Check:   Does the patient have all the supplies they need for the next visit?  Yes    Next visit plan:  at 1100    Nuha Spicer RN 2025  "

## 2025-06-14 ENCOUNTER — HEALTH MAINTENANCE LETTER (OUTPATIENT)
Age: 60
End: 2025-06-14

## 2025-06-16 ENCOUNTER — HOME INFUSION BILLING (OUTPATIENT)
Dept: HOME HEALTH SERVICES | Facility: HOME HEALTH | Age: 60
End: 2025-06-16
Payer: COMMERCIAL

## 2025-06-22 ASSESSMENT — ACTIVITIES OF DAILY LIVING (ADL)
SWALLOWING: NORMAL
IMPAIRMENT OF ABILITY TO RISE FROM CHAIR: NORMAL
EYELID DROOP: DAILY, NOT CONSTANT
BREATHING: NORMAL
TALKING: NORMAL
MYC_MG-ADL-TOTAL_SCORE: 0
DOUBLE_VISION: DAILY, NOT CONSTANT
IMPAIRMENT OF ABILITY TO BRUSH TEETH/COMB HAIR: NORMAL
CHEWING: NORMAL

## 2025-06-23 ENCOUNTER — OFFICE VISIT (OUTPATIENT)
Dept: NEUROLOGY | Facility: CLINIC | Age: 60
End: 2025-06-23
Payer: COMMERCIAL

## 2025-06-23 VITALS
DIASTOLIC BLOOD PRESSURE: 81 MMHG | RESPIRATION RATE: 16 BRPM | HEART RATE: 87 BPM | SYSTOLIC BLOOD PRESSURE: 115 MMHG | OXYGEN SATURATION: 98 %

## 2025-06-23 DIAGNOSIS — G70.00 MYASTHENIA GRAVIS WITHOUT EXACERBATION (H): Primary | ICD-10-CM

## 2025-06-23 PROCEDURE — 99214 OFFICE O/P EST MOD 30 MIN: CPT | Performed by: PSYCHIATRY & NEUROLOGY

## 2025-06-23 PROCEDURE — G2211 COMPLEX E/M VISIT ADD ON: HCPCS | Performed by: PSYCHIATRY & NEUROLOGY

## 2025-06-23 PROCEDURE — 3079F DIAST BP 80-89 MM HG: CPT | Performed by: PSYCHIATRY & NEUROLOGY

## 2025-06-23 PROCEDURE — 3074F SYST BP LT 130 MM HG: CPT | Performed by: PSYCHIATRY & NEUROLOGY

## 2025-06-23 NOTE — NURSING NOTE
Chief Complaint   Patient presents with    RECHECK     Return Myasthenia Gravis    Gustavo Jaramillo

## 2025-06-23 NOTE — PROGRESS NOTES
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 Onset was around 2010. First symptom was diplopia. About 3 years later ptosis started. No dysarthria, dysphagia, SOB, or weakness. In 2015 he was found to have ACHR ab. In 2015 he was diagnosed with MG and started on prednisone. He started prednisone 60 mg and tapered off over about 3 months. He was off immunotherapy in 2016, and remained off until 12/2018. At that end of 2018 he developed diplopia and restarted prednisone 60 mg daily. He tapered over a few months. By 2/20 he was taking prednisone 5 mg daily. This time prednisone was helpful to alleviate ptosis and diplopia. He remained on low dose prednisone (around 5 mg) through most of 2019. He was stable during that time. In April 2020 he then developed worsening ptosis and diplopia of both eyes. In 4/20 he increased prednisone to 40 mg daily. By June 2020 he was back down to 5 mg. In 9/20 he then experienced new weakness in his left hand. Typing was difficult. He also felt that his legs were weaker. There was a clear drop off in his typical level of activity and exercise. No dysarthria or dysphagia. In 10/20 prednisone increased to 40 mg daily. Prednisone tapered to 10mg daily in 01/21, and 5 mg by 3/21. He underwent thymectomy on 3/22/21. Leading into thymectomy he received IVIG (2g/kg over 3 days from 3/15-17/2021). It was helpful but poorly tolerated (headache, nausea, malaise). In late 2021 and early 2022 he made several attempts to reduce prednisone below 5. By June 2022 he was able to wean off prednisone but in 1/23 he relapsed. In March 2023 he started vyvgart (series completed 3/28/23). He received three cycles. Vygart was partially helpful, but he was never able to get ADL above 5. In 8/23 he started ultomiris. In 9/23 he tapered off prednisone.     Interval history:   I last saw him in clinic on  25. He stopped prednisone in 2025. IVIG remains at 0.4 gm/kg q 3 weeks. He has done well over the last few months. He continues to have diplopia and ptosis, but not ptosis all the time and not diplopia every day. No dysarthria or dysphagia. No limb weakness. He is playing trombone at a level that he feels appropriate.  Chewing is ok. Over the weekend the temperature reached near 100 degrees. He lost AC and was not able to sleep well. Even through this his MG symptoms did not worsen.     Prior pertinent laboratory work-up:  : Normal/negative Hba1c, TSH  4/15: ACHR ab binding 3.1 (N<0.4)    Prior electrophysiologic work-up:  12/10: RNS of the right facial nerves showed no abnormal decreament or increment.Single fiber EMG of the right frontalis muscle was subjectively well within the normal range (normal <1.69).  All individual pairs demonstrated normal jitter ranging from 13 ms to 47.2 ms with normal <53.5 ms in the frontalis.  The mean jitter of the 20 pairs in the right frontalis muscle was 22.9 ms (nl< 35.5ms). There was no transmission blocking. There is no electrophysiological evidence for neuromuscular junction disorder.  10/14/20: NCS/RNS showed unequivocal decrement in facial-nasalis and ulnar-ADM.     Prior imagin/9/17 CT C/A/P showed no mediastinal mass  10/29/20: CT chest showed unchanged tiny soft tissue density in the anterior superior mediastinum, presumably residual thymus. No enlargement to suggest Thymoma.    Past Medical History:   Past Medical History:   Diagnosis Date    Asthma     Kidney stone     Myasthenia gravis (H)     Strabismus     Thyroid disease      Past Surgical History:  Past Surgical History:   Procedure Laterality Date    COLONOSCOPY N/A 2023    Procedure: COLONOSCOPY;  Surgeon: Trevor Perez MD;  Location: [facility] OR    HERNIA REPAIR      LASER HOLMIUM LITHOTRIPSY URETER(S), INSERT STENT, COMBINED Right 10/15/2014    Procedure: COMBINED CYSTOSCOPY,  URETEROSCOPY, LASER HOLMIUM LITHOTRIPSY URETER(S), INSERT STENT;  Surgeon: Thong Bates MD;  Location: [facility] OR    RECESSION RESECTION WITH ADJUSTABLE SUTURE  9/4/2012    LLRc 5.0mm on adj. LIRc 4.5mm on adj.    RECESSION RESECTION WITH ADJUSTABLE SUTURE BILATERAL  12/18/2012    RSRc 3.0mm; adj. suture    STRABISMUS SURGERY      THORACOSCOPIC THYMECTOMY N/A 3/22/2021    Procedure: SUBXIPHOID BILATERAL THORACOSCOPIC THYMECTOMY;  Surgeon: Benjy Hull MD;  Location: [facility] OR     Family history:    There is no known family history of myopathy or other neuromuscular disorders. He does have a sister with spasmodic dysphonia.     Social History:    He denies tobacco, alcohol, or illicit drug use.     Medical Allergies:    Allergies   Allergen Reactions    Nkda [No Known Drug Allergy]      Current Medications:    Current Outpatient Medications   Medication Sig Dispense Refill    acetaminophen (TYLENOL) 325 MG tablet Take 2 tablets (650 mg) by mouth every 21 days. Administer 30 minutes prior to infusion 32 tablet 0    albuterol (PROAIR HFA/PROVENTIL HFA/VENTOLIN HFA) 108 (90 Base) MCG/ACT inhaler Inhale 2 puffs into the lungs every 6 hours as needed for shortness of breath or wheezing 18 g 1    atorvastatin (LIPITOR) 20 MG tablet Take 1 tablet (20 mg) by mouth daily. 90 tablet 3    diphenhydrAMINE (BENADRYL) 25 MG capsule Take 2 capsules (50 mg) by mouth every 21 days. Administer 30 minutes prior to infusion 32 capsule 0    immune globulin - sucrose free 10% (PRIVIGEN) 10 GM/100ML infusion Administer 350 mL (35 g total = 1 vial of 5 g + 1 vial of 10 g + 1 vial of 20 g) Privigen IV via vial spike and CADD pump over 2 hours every 21 days. Continuous rate: 40 mL/hr (0.5ml/kg/hr) x15 min, 80 mL/hr x15 min, 120 mL/hr x15 min, 160 mL/hr x15 min, 200 mL/hr x15 min, 240 mL/hr x15 min, 280 mL/hr x15 min, 320 mL/hr (4ml/kg/hr) until infusion complete.   Do not shake. Discard remainder of vial(s). 1500 mL 0    immune  globulin - sucrose free 10% (PRIVIGEN) 20 GM/200ML infusion Administer 350 mL (35 g total = 1 vial of 5 g + 1 vial of 10 g + 1 vial of 20 g) Privigen IV via vial spike and CADD pump over 2 hours every 21 days. Continuous rate: 40 mL/hr (0.5ml/kg/hr) x15 min, 80 mL/hr x15 min, 120 mL/hr x15 min, 160 mL/hr x15 min, 200 mL/hr x15 min, 240 mL/hr x15 min, 280 mL/hr x15 min, 320 mL/hr (4ml/kg/hr) until infusion complete.   Do not shake. Discard remainder of vial(s).Discard remainder of vial(s). 3000 mL 0    immune globulin - sucrose free 10% (PRIVIGEN) 5 GM/50ML infusion Administer 350 mL (35 g total = 1 vial of 5 g + 1 vial of 10 g + 1 vial of 20 g) Privigen IV via vial spike and CADD pump over 2 hours every 21 days. Continuous rate: 40 mL/hr (0.5ml/kg/hr) x15 min, 80 mL/hr x15 min, 120 mL/hr x15 min, 160 mL/hr x15 min, 200 mL/hr x15 min, 240 mL/hr x15 min, 280 mL/hr x15 min, 320 mL/hr (4ml/kg/hr) until infusion complete.   Do not shake. Discard remainder of vial(s). 750 mL 0    levothyroxine (SYNTHROID/LEVOTHROID) 137 MCG tablet Take 1 tablet (137 mcg) by mouth every morning (before breakfast). 90 tablet 3    predniSONE (DELTASONE) 5 MG tablet TAKE ONE TABLET BY MOUTH ONCE DAILY. 60 tablet 3    pyRIDostigmine (MESTINON) 60 MG tablet TAKE ONE TABLET BY MOUTH 2 TO 3 TIMES A DAY. 90 tablet 1    pyRIDostigmine (MESTINON) 60 MG tablet TAKE ONE TABLET BY MOUTH 2 TO 3 TIMES A DAY. 90 tablet 1    sodium chloride, PF, 0.9% PF flush Inject 10 mLs into the vein as needed for line flush. Flush IV before and after medication administration as directed and/or at least every 12 hours. [id number] mL 0    Vitamin D, Cholecalciferol, 25 MCG (1000 UT) TABS Take by mouth every morning      predniSONE (DELTASONE) 5 MG tablet Take 1 tablet (5 mg) by mouth See Admin Instructions 60 tablet 3    pyridostigmine (MESTINON) 60 MG tablet TAKE ONE TABLET BY MOUTH THREE TIMES A DAY 90 tablet 3     No current facility-administered medications for this  visit.     Review of Systems: A review of systems was obtained and was negative except for what was noted above.    Physical examination:    /81   Pulse 87   Resp 16   SpO2 98%     General Appearance: NAD     Skin: There are no rashes or other skin lesions.     Neurologic examination:     Mental status:  Patient is alert, attentive, and oriented x 3.  Language is coherent and fluent without aphasia.  Memory, comprehension and ability to follow commands were intact.        Cranial nerves: No ptosis at baseline. After 30 seconds of upgaze very mild left ptosis emerged. No diplopia at mid position or with sustained upgaze.  Symmetric smile and full. No dysarthria.      Motor exam: No atrophy or fasciculations. Manual muscle testing revealed the following MRC grade muscle power:    Right Left   Neck flexion 5 5   Neck extension: 5 5   Shoulder ext rotation 5 5   Shoulder abduction:        5 5   Elbow extension: 5 5   Elbow flexion:            5 5   Wrist flexion:            5 5   Wrist extension:        5 5   Finger flexion 5 5   Finger extension 5 5   FDI 5 5   Hip flexion 5 5   Knee flexion 5 5   Knee extension 5 5   Dorsiflexion 5 5   Plantar flexion 5 5   Red indicates worse when compared to last examination  Green indicates improved when compared to last examination     MG Outcomes MG-ADL MG-Composite  strength (kg) MG medications   10/12/20 7 9 Right 35, Left 31 Pred 5 mg daily   11/18/20 5 6 Right 40, Left 39 Pred 25 mg daily   1/20/21 4 2 Right 41, Left 41 Prednisone 10mg daily   3/22/21 xxx xxx xxx Thymectomy   3/31/21   5 9 Right 44, Left 46 IVIG prior to thymectomy 2g/kg over 3 days (03/15-17/2021),  Prednisone 5mg daily   7/14/21 3 4 Right 43, Left 44 Pred 5 mg   3/14/22 3 Not tested (video) Not tested (video) Pred 5 mg   6/22 xxx xxx xxx Weaned off prednisone   2/8/23 7 8 Right 32 kg, left 36 Kg None (pred 20 mg started just 2 days ago)   5/3/23 5 6 Right 32 kg, left 35 kg Vyvgart series (4  weekly infusions) completed 3/28/23   7/19/23 5 6 Right 36 kg, left 33 kg Vyvgart series (currently in between infusion 2 and 3 on 3rd vyvgart cycle), pred 5 mg daily   9/20/23 3 3 Right 38 kg, Left 37 kg Ultomiris started 8/18/23, Pred 5 mg   2/7/24 3 3 Right 38 kg, left 38 kg Ultomiris started 8/18/23, receiving q 2 months   8/21/24 2 3 Right 39 kg, Left 42 kg Ultomiris started 8/18/23, receiving q 2 months; pred 5 mg qOD   11/22/24 xxx xxx xxx Last ultomiris infusion    2/19/25 6 7 Right 40, Left 41 kg IVIG 0.4 gm/kg q 3 weeks (first dose started just 1 day ago), pred 5 qOD   6/23/25 3 1 Right 39 kg, Left 38 kg IVIG 0.4 gm/kg q 3 weeks      Assessment:    Leonidas Pratt is a 60 year old man with ACHR ab positive generalized non-thymomatous myasthenia gravis s/p thymectomy in 3/2021. Presently his symptoms are well controlled with relatively low dose IVIG. Perhaps this is due to the delayed effects of thymectomy? Will contiue to dose optimize IVIG as below. If he worsens then we will discuss continued IVIG vs SCIG vs re-trial of vyvgart subcutaneous. All questions answered.     Plan:      1. Immunotherapy:   - Prednisone: Stopped 3/25.   - Vyvgart: Suboptimally beneficial after 3 cycles between 2/23 and 7/23. Discontinued 7/23  - Ultomiris: Started 8/23. Stopped 11/24 due to arthralgias.   - IVIG: Reduce IVIG 0.4 gm/kg q 3 weeks to q 4 weeks. He will reach out to me after 2 cycles at q 4 weeks. If stable then we will reduce further to q 5 weeks. If stable after 2 additional cycles then may reduce to to 6 weeks. If worsens during the optimization then will discuss continued IVIG at lowest effective dose vs SCIG vs vyvgart re-trial.   2. Thymectomy: Completed 3/22/21.   3. Supportive care:  - Mestinon: He has not needed to take it for 3 weeks. He can continue 60 mg BID to TID as needed  4. Potential MG triggers including heat, surgery, and illness. Certain medications and over the counter preparations may also cause  worsening of MG symptoms. A list of cautionary medications can be found at: [email or web address]  5. Disclosures discussed and in AVS  6. Follow up 6 months. Sooner if needed.    -----

## 2025-06-23 NOTE — LETTER
6/23/2025       RE: Leonidas Pratt  [address]  [place] [address]     Dear Colleague,    Thank you for referring your patient, Leonidas Pratt, to the [facility] [place] at [facility]. Please see a copy of my visit note below.    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 Onset was around 2010. First symptom was diplopia. About 3 years later ptosis started. No dysarthria, dysphagia, SOB, or weakness. In 2015 he was found to have ACHR ab. In 2015 he was diagnosed with MG and started on prednisone. He started prednisone 60 mg and tapered off over about 3 months. He was off immunotherapy in 2016, and remained off until 12/2018. At that end of 2018 he developed diplopia and restarted prednisone 60 mg daily. He tapered over a few months. By 2/20 he was taking prednisone 5 mg daily. This time prednisone was helpful to alleviate ptosis and diplopia. He remained on low dose prednisone (around 5 mg) through most of 2019. He was stable during that time. In April 2020 he then developed worsening ptosis and diplopia of both eyes. In 4/20 he increased prednisone to 40 mg daily. By June 2020 he was back down to 5 mg. In 9/20 he then experienced new weakness in his left hand. Typing was difficult. He also felt that his legs were weaker. There was a clear drop off in his typical level of activity and exercise. No dysarthria or dysphagia. In 10/20 prednisone increased to 40 mg daily. Prednisone tapered to 10mg daily in 01/21, and 5 mg by 3/21. He underwent thymectomy on 3/22/21. Leading into thymectomy he received IVIG (2g/kg over 3 days from 3/15-17/2021). It was helpful but poorly tolerated (headache, nausea, malaise). In late 2021 and early 2022 he made several attempts to reduce prednisone below 5. By June 2022 he was able to  wean off prednisone but in  he relapsed. In 2023 he started vyvgart (series completed 3/28/23). He received three cycles. Vygart was partially helpful, but he was never able to get ADL above 5. In  he started ultomiris. In  he tapered off prednisone.     Interval history:   I last saw him in clinic on 25. He stopped prednisone in 2025. IVIG remains at 0.4 gm/kg q 3 weeks. He has done well over the last few months. He continues to have diplopia and ptosis, but not ptosis all the time and not diplopia every day. No dysarthria or dysphagia. No limb weakness. He is playing trombone at a level that he feels appropriate.  Chewing is ok. Over the weekend the temperature reached near 100 degrees. He lost AC and was not able to sleep well. Even through this his MG symptoms did not worsen.     Prior pertinent laboratory work-up:  : Normal/negative Hba1c, TSH  4/15: ACHR ab binding 3.1 (N<0.4)    Prior electrophysiologic work-up:  12/10: RNS of the right facial nerves showed no abnormal decreament or increment.Single fiber EMG of the right frontalis muscle was subjectively well within the normal range (normal <1.69).  All individual pairs demonstrated normal jitter ranging from 13 ms to 47.2 ms with normal <53.5 ms in the frontalis.  The mean jitter of the 20 pairs in the right frontalis muscle was 22.9 ms (nl< 35.5ms). There was no transmission blocking. There is no electrophysiological evidence for neuromuscular junction disorder.  10/14/20: NCS/RNS showed unequivocal decrement in facial-nasalis and ulnar-ADM.     Prior imagin/9/17 CT C/A/P showed no mediastinal mass  10/29/20: CT chest showed unchanged tiny soft tissue density in the anterior superior mediastinum, presumably residual thymus. No enlargement to suggest Thymoma.    Past Medical History:   Past Medical History:   Diagnosis Date     Asthma      Kidney stone      Myasthenia gravis (H)      Strabismus      Thyroid disease       Past Surgical History:  Past Surgical History:   Procedure Laterality Date     COLONOSCOPY N/A 1/13/2023    Procedure: COLONOSCOPY;  Surgeon: Trevor Perez MD;  Location: [facility] OR     HERNIA REPAIR       LASER HOLMIUM LITHOTRIPSY URETER(S), INSERT STENT, COMBINED Right 10/15/2014    Procedure: COMBINED CYSTOSCOPY, URETEROSCOPY, LASER HOLMIUM LITHOTRIPSY URETER(S), INSERT STENT;  Surgeon: Thong Bates MD;  Location: [facility] OR     RECESSION RESECTION WITH ADJUSTABLE SUTURE  9/4/2012    LLRc 5.0mm on adj. LIRc 4.5mm on adj.     RECESSION RESECTION WITH ADJUSTABLE SUTURE BILATERAL  12/18/2012    RSRc 3.0mm; adj. suture     STRABISMUS SURGERY       THORACOSCOPIC THYMECTOMY N/A 3/22/2021    Procedure: SUBXIPHOID BILATERAL THORACOSCOPIC THYMECTOMY;  Surgeon: Benjy Hull MD;  Location: [facility] OR     Family history:    There is no known family history of myopathy or other neuromuscular disorders. He does have a sister with spasmodic dysphonia.     Social History:    He denies tobacco, alcohol, or illicit drug use.     Medical Allergies:    Allergies   Allergen Reactions     Nkda [No Known Drug Allergy]      Current Medications:    Current Outpatient Medications   Medication Sig Dispense Refill     acetaminophen (TYLENOL) 325 MG tablet Take 2 tablets (650 mg) by mouth every 21 days. Administer 30 minutes prior to infusion 32 tablet 0     albuterol (PROAIR HFA/PROVENTIL HFA/VENTOLIN HFA) 108 (90 Base) MCG/ACT inhaler Inhale 2 puffs into the lungs every 6 hours as needed for shortness of breath or wheezing 18 g 1     atorvastatin (LIPITOR) 20 MG tablet Take 1 tablet (20 mg) by mouth daily. 90 tablet 3     diphenhydrAMINE (BENADRYL) 25 MG capsule Take 2 capsules (50 mg) by mouth every 21 days. Administer 30 minutes prior to infusion 32 capsule 0     immune globulin - sucrose free 10% (PRIVIGEN) 10 GM/100ML infusion Administer 350 mL (35 g total = 1 vial of 5 g + 1 vial of 10 g + 1 vial of 20 g) Privigen  IV via vial spike and CADD pump over 2 hours every 21 days. Continuous rate: 40 mL/hr (0.5ml/kg/hr) x15 min, 80 mL/hr x15 min, 120 mL/hr x15 min, 160 mL/hr x15 min, 200 mL/hr x15 min, 240 mL/hr x15 min, 280 mL/hr x15 min, 320 mL/hr (4ml/kg/hr) until infusion complete.   Do not shake. Discard remainder of vial(s). 1500 mL 0     immune globulin - sucrose free 10% (PRIVIGEN) 20 GM/200ML infusion Administer 350 mL (35 g total = 1 vial of 5 g + 1 vial of 10 g + 1 vial of 20 g) Privigen IV via vial spike and CADD pump over 2 hours every 21 days. Continuous rate: 40 mL/hr (0.5ml/kg/hr) x15 min, 80 mL/hr x15 min, 120 mL/hr x15 min, 160 mL/hr x15 min, 200 mL/hr x15 min, 240 mL/hr x15 min, 280 mL/hr x15 min, 320 mL/hr (4ml/kg/hr) until infusion complete.   Do not shake. Discard remainder of vial(s).Discard remainder of vial(s). 3000 mL 0     immune globulin - sucrose free 10% (PRIVIGEN) 5 GM/50ML infusion Administer 350 mL (35 g total = 1 vial of 5 g + 1 vial of 10 g + 1 vial of 20 g) Privigen IV via vial spike and CADD pump over 2 hours every 21 days. Continuous rate: 40 mL/hr (0.5ml/kg/hr) x15 min, 80 mL/hr x15 min, 120 mL/hr x15 min, 160 mL/hr x15 min, 200 mL/hr x15 min, 240 mL/hr x15 min, 280 mL/hr x15 min, 320 mL/hr (4ml/kg/hr) until infusion complete.   Do not shake. Discard remainder of vial(s). 750 mL 0     levothyroxine (SYNTHROID/LEVOTHROID) 137 MCG tablet Take 1 tablet (137 mcg) by mouth every morning (before breakfast). 90 tablet 3     predniSONE (DELTASONE) 5 MG tablet TAKE ONE TABLET BY MOUTH ONCE DAILY. 60 tablet 3     pyRIDostigmine (MESTINON) 60 MG tablet TAKE ONE TABLET BY MOUTH 2 TO 3 TIMES A DAY. 90 tablet 1     pyRIDostigmine (MESTINON) 60 MG tablet TAKE ONE TABLET BY MOUTH 2 TO 3 TIMES A DAY. 90 tablet 1     sodium chloride, PF, 0.9% PF flush Inject 10 mLs into the vein as needed for line flush. Flush IV before and after medication administration as directed and/or at least every 12 hours. 075249 mL 0      Vitamin D, Cholecalciferol, 25 MCG (1000 UT) TABS Take by mouth every morning       predniSONE (DELTASONE) 5 MG tablet Take 1 tablet (5 mg) by mouth See Admin Instructions 60 tablet 3     pyridostigmine (MESTINON) 60 MG tablet TAKE ONE TABLET BY MOUTH THREE TIMES A DAY 90 tablet 3     No current facility-administered medications for this visit.     Review of Systems: A review of systems was obtained and was negative except for what was noted above.    Physical examination:    /81   Pulse 87   Resp 16   SpO2 98%     General Appearance: NAD     Skin: There are no rashes or other skin lesions.     Neurologic examination:     Mental status:  Patient is alert, attentive, and oriented x 3.  Language is coherent and fluent without aphasia.  Memory, comprehension and ability to follow commands were intact.        Cranial nerves: No ptosis at baseline. After 30 seconds of upgaze very mild left ptosis emerged. No diplopia at mid position or with sustained upgaze.  Symmetric smile and full. No dysarthria.      Motor exam: No atrophy or fasciculations. Manual muscle testing revealed the following MRC grade muscle power:    Right Left   Neck flexion 5 5   Neck extension: 5 5   Shoulder ext rotation 5 5   Shoulder abduction:        5 5   Elbow extension: 5 5   Elbow flexion:            5 5   Wrist flexion:            5 5   Wrist extension:        5 5   Finger flexion 5 5   Finger extension 5 5   FDI 5 5   Hip flexion 5 5   Knee flexion 5 5   Knee extension 5 5   Dorsiflexion 5 5   Plantar flexion 5 5   Red indicates worse when compared to last examination  Green indicates improved when compared to last examination     MG Outcomes MG-ADL MG-Composite  strength (kg) MG medications   10/12/20 7 9 Right 35, Left 31 Pred 5 mg daily   11/18/20 5 6 Right 40, Left 39 Pred 25 mg daily   1/20/21 4 2 Right 41, Left 41 Prednisone 10mg daily   3/22/21 xxx xxx xxx Thymectomy   3/31/21   5 9 Right 44, Left 46 IVIG prior to  thymectomy 2g/kg over 3 days (03/15-17/2021),  Prednisone 5mg daily   7/14/21 3 4 Right 43, Left 44 Pred 5 mg   3/14/22 3 Not tested (video) Not tested (video) Pred 5 mg   6/22 xxx xxx xxx Weaned off prednisone   2/8/23 7 8 Right 32 kg, left 36 Kg None (pred 20 mg started just 2 days ago)   5/3/23 5 6 Right 32 kg, left 35 kg Vyvgart series (4 weekly infusions) completed 3/28/23   7/19/23 5 6 Right 36 kg, left 33 kg Vyvgart series (currently in between infusion 2 and 3 on 3rd vyvgart cycle), pred 5 mg daily   9/20/23 3 3 Right 38 kg, Left 37 kg Ultomiris started 8/18/23, Pred 5 mg   2/7/24 3 3 Right 38 kg, left 38 kg Ultomiris started 8/18/23, receiving q 2 months   8/21/24 2 3 Right 39 kg, Left 42 kg Ultomiris started 8/18/23, receiving q 2 months; pred 5 mg qOD   11/22/24 xxx xxx xxx Last ultomiris infusion    2/19/25 6 7 Right 40, Left 41 kg IVIG 0.4 gm/kg q 3 weeks (first dose started just 1 day ago), pred 5 qOD   6/23/25 3 1 Right 39 kg, Left 38 kg IVIG 0.4 gm/kg q 3 weeks      Assessment:    Leonidas Pratt is a 60 year old man with ACHR ab positive generalized non-thymomatous myasthenia gravis s/p thymectomy in 3/2021. Presently his symptoms are well controlled with relatively low dose IVIG. Perhaps this is due to the delayed effects of thymectomy? Will contiue to dose optimize IVIG as below. If he worsens then we will discuss continued IVIG vs SCIG vs re-trial of vyvgart subcutaneous. All questions answered.     Plan:      1. Immunotherapy:   - Prednisone: Stopped 3/25.   - Vyvgart: Suboptimally beneficial after 3 cycles between 2/23 and 7/23. Discontinued 7/23  - Ultomiris: Started 8/23. Stopped 11/24 due to arthralgias.   - IVIG: Reduce IVIG 0.4 gm/kg q 3 weeks to q 4 weeks. He will reach out to me after 2 cycles at q 4 weeks. If stable then we will reduce further to q 5 weeks. If stable after 2 additional cycles then may reduce to to 6 weeks. If worsens during the optimization then will discuss continued  IVIG at lowest effective dose vs SCIG vs vyvgart re-trial.   2. Thymectomy: Completed 3/22/21.   3. Supportive care:  - Mestinon: He has not needed to take it for 3 weeks. He can continue 60 mg BID to TID as needed  4. Potential MG triggers including heat, surgery, and illness. Certain medications and over the counter preparations may also cause worsening of MG symptoms. A list of cautionary medications can be found at: [email or web address]://[email or web address].[email or web address]-is-MG/MG-Management/[email or web address]-Drugs  5. Disclosures discussed and in AVS  6. Follow up 6 months. Sooner if needed.    -----      Again, thank you for allowing me to participate in the care of your patient.      Sincerely,    Rj Jackson MD

## 2025-07-01 ENCOUNTER — HOME CARE VISIT (OUTPATIENT)
Dept: HOME HEALTH SERVICES | Facility: HOME HEALTH | Age: 60
End: 2025-07-01
Payer: COMMERCIAL

## 2025-07-01 VITALS
WEIGHT: 175 LBS | OXYGEN SATURATION: 99 % | RESPIRATION RATE: 16 BRPM | HEART RATE: 62 BPM | DIASTOLIC BLOOD PRESSURE: 91 MMHG | TEMPERATURE: 97.7 F | BODY MASS INDEX: 27 KG/M2 | SYSTOLIC BLOOD PRESSURE: 131 MMHG

## 2025-07-01 PROCEDURE — 99602 HOME NFS VISIT EACH ADDL HR: CPT | Mod: SS

## 2025-07-01 PROCEDURE — S9338 HIT IMMUNOTHERAPY DIEM: HCPCS

## 2025-07-01 PROCEDURE — 99601 HOME NFS VISIT <2 HRS: CPT | Mod: SS

## 2025-07-01 NOTE — PROGRESS NOTES
Nursing Visit Note:  Nurse visit today for patrick  for Leonidas Pratt.     present during visit today: Not Applicable.    Intravenous Access:  Peripheral IV placed.    Infusion Nursing Note:    Pre-infusion Checklist:   Have you had any delayed reaction since last infusion?   No     Have you recently had an elevated temperature, fever, chills productive cough, coughing for 3 weeks or longer or hemoptysis, abnormal vital signs, night sweats, chest pain, or have you noticed a decrease in your appetite, or noted unexplained weight loss or fatigue?   No     Do you have any open wounds or new incisions?  No     Do you have any recent or upcoming hospitalizations, surgeries, or dental procedures? Does not include esophagogastroduodenoscopy, colonoscopy, endoscopic retrograde cholangiopancreatography (ERCP), endoscopic ultrasound (EUS), dental procedures or joint aspiration/steroid injections.   No     Do you currently have or recently have had any signs of illness or infection or are you on any antibiotics?   No     Have you had any new, sudden, or worsening abdominal pain?   No     Have you or anyone in your household received a live vaccination in the past 4 weeks?   No     Have you recently been diagnosed with any new nervous system diseases or cancer diagnosis? (i.e., Multiple Sclerosis, Guillain [place], seizures, neurological changes) Are you receiving any form of radiation or chemotherapy?   No     Are you pregnant or breastfeeding, or do you have plans of pregnancy in the future?   No     Have you been having any signs of worsening depression or suicidal ideation?  No     Have you had any other new onset medical symptoms?  No    Entyvio/Ocrevus/Tyasabri only: Have you been having any new or worsening medical problems such as issues with thinking, eyesight, balance or strength that have persisted over several days?   N/A    Benlysta only: Have you been having any signs of worsening depression or  "suicidal ideations?    No    IVIG only: Have you had any new blood clots?  No    Did the patient answer \"YES\" to any of the questions above?  No     Will the patient receive a medication that has an order for infusion reaction management?  Yes, and all drugs and supplies are available and none have .     If ordered, has the patient taken pre-medications?  Yes    Plan:   Therapy is appropriate, will proceed with treatment.     Post Infusion Assessment:  Patient tolerated infusion without incident.  Access discontinued per protocol.       Note: AVSS. Pt had an MD appt last week and the plan is to try to decrease frequency of privigen infusions. Pt continues to have some muscle weakness and eyelid drooping, but feels that is is managed well with infusions. PIV placed and infusion tolerated well.     Saline administered: 20 (ml)    Supply Check:   Does the patient have all the supplies they need for the next visit?  Yes    Next visit plan: Scheduled 4 weeks out.  at 1100    Nuha Spicer RN 2025  "

## 2025-07-02 PROCEDURE — S1015 IV TUBING EXTENSION SET: HCPCS

## 2025-07-17 ENCOUNTER — HOME INFUSION (OUTPATIENT)
Dept: HOME HEALTH SERVICES | Facility: HOME HEALTH | Age: 60
End: 2025-07-17
Payer: COMMERCIAL

## 2025-07-17 DIAGNOSIS — G70.00 MYASTHENIA GRAVIS WITHOUT EXACERBATION (H): Primary | ICD-10-CM

## 2025-07-22 ENCOUNTER — EPISODE UPDATE (OUTPATIENT)
Dept: HOME HEALTH SERVICES | Facility: HOME HEALTH | Age: 60
End: 2025-07-22
Payer: COMMERCIAL

## 2025-07-28 ENCOUNTER — HOME INFUSION BILLING (OUTPATIENT)
Dept: HOME HEALTH SERVICES | Facility: HOME HEALTH | Age: 60
End: 2025-07-28
Payer: COMMERCIAL

## 2025-07-29 ENCOUNTER — HOME CARE VISIT (OUTPATIENT)
Dept: HOME HEALTH SERVICES | Facility: HOME HEALTH | Age: 60
End: 2025-07-29
Payer: COMMERCIAL

## 2025-07-29 VITALS
RESPIRATION RATE: 16 BRPM | TEMPERATURE: 97.3 F | HEART RATE: 62 BPM | DIASTOLIC BLOOD PRESSURE: 88 MMHG | SYSTOLIC BLOOD PRESSURE: 122 MMHG | OXYGEN SATURATION: 98 %

## 2025-07-29 PROCEDURE — 99602 HOME NFS VISIT EACH ADDL HR: CPT | Mod: SS

## 2025-07-29 PROCEDURE — S9338 HIT IMMUNOTHERAPY DIEM: HCPCS

## 2025-07-29 PROCEDURE — 99601 HOME NFS VISIT <2 HRS: CPT | Mod: SS

## 2025-07-29 ASSESSMENT — PAIN SCALES - GENERAL: PAINLEVEL: NO PAIN (0)

## 2025-07-29 NOTE — PROGRESS NOTES
Nursing Visit Note:  Nurse visit today for Privigen 35GM for Leonidas Pratt.     present during visit today: Not Applicable.    Intravenous Access:  Peripheral IV placed.    Infusion Nursing Note:    Pre-infusion Checklist:   Have you had any delayed reaction since last infusion?   No     Have you recently had an elevated temperature, fever, chills productive cough, coughing for 3 weeks or longer or hemoptysis, abnormal vital signs, night sweats, chest pain, or have you noticed a decrease in your appetite, or noted unexplained weight loss or fatigue?   No     Do you have any open wounds or new incisions?  No     Do you have any recent or upcoming hospitalizations, surgeries, or dental procedures? Does not include esophagogastroduodenoscopy, colonoscopy, endoscopic retrograde cholangiopancreatography (ERCP), endoscopic ultrasound (EUS), dental procedures or joint aspiration/steroid injections.   No     Do you currently have or recently have had any signs of illness or infection or are you on any antibiotics?   No     Have you had any new, sudden, or worsening abdominal pain?   No     Have you or anyone in your household received a live vaccination in the past 4 weeks?   No     Have you recently been diagnosed with any new nervous system diseases or cancer diagnosis? (i.e., Multiple Sclerosis, Guillain [place], seizures, neurological changes) Are you receiving any form of radiation or chemotherapy?   No     Are you pregnant or breastfeeding, or do you have plans of pregnancy in the future?   No     Have you been having any signs of worsening depression or suicidal ideation?  No     Have you had any other new onset medical symptoms?  No    Entyvio/Ocrevus/Tyasabri only: Have you been having any new or worsening medical problems such as issues with thinking, eyesight, balance or strength that have persisted over several days?   N/A    Benlysta only: Have you been having any signs of worsening depression or  "suicidal ideations?    N/A    IVIG only: Have you had any new blood clots?  No    Did the patient answer \"YES\" to any of the questions above?  No     Will the patient receive a medication that has an order for infusion reaction management?  Yes, and all drugs and supplies are available and none have .     If ordered, has the patient taken pre-medications?  Yes    1104 Acetaminophen 650mg po and Diphenhydramine 50mg po     Plan:   Therapy is appropriate, will proceed with treatment.     Post Infusion Assessment:  Patient tolerated infusion without incident.     Note: Pt reports that his sx of muscle weakness, eyelid droop when fatigued remain the same but are stable.  Pt spoke to MD about reducing frequency of Privigen and is hopeful this will begin soon.  MD order states that pt can reach out after today's dose and if sx are stable he can be changed to every 5 weeks and then to 6 weeks after another 2 cycles.    Saline administered: 20 (ml)      Next visit plan: 25 Karen Taylor RN 2025  "

## 2025-07-31 PROCEDURE — S1015 IV TUBING EXTENSION SET: HCPCS

## 2025-08-04 ENCOUNTER — HOME INFUSION (OUTPATIENT)
Dept: HOME HEALTH SERVICES | Facility: HOME HEALTH | Age: 60
End: 2025-08-04
Payer: COMMERCIAL

## 2025-08-04 DIAGNOSIS — G70.00 MYASTHENIA GRAVIS WITHOUT EXACERBATION (H): Primary | ICD-10-CM

## 2025-08-25 ENCOUNTER — HOME INFUSION BILLING (OUTPATIENT)
Dept: HOME HEALTH SERVICES | Facility: HOME HEALTH | Age: 60
End: 2025-08-25
Payer: COMMERCIAL

## 2025-08-26 ENCOUNTER — HOME CARE VISIT (OUTPATIENT)
Dept: HOME HEALTH SERVICES | Facility: HOME HEALTH | Age: 60
End: 2025-08-26
Payer: COMMERCIAL

## 2025-08-26 VITALS
SYSTOLIC BLOOD PRESSURE: 147 MMHG | TEMPERATURE: 96.9 F | DIASTOLIC BLOOD PRESSURE: 86 MMHG | WEIGHT: 175 LBS | OXYGEN SATURATION: 99 % | RESPIRATION RATE: 16 BRPM | BODY MASS INDEX: 27 KG/M2 | HEART RATE: 64 BPM

## 2025-08-26 PROCEDURE — 99601 HOME NFS VISIT <2 HRS: CPT | Mod: SS

## 2025-08-26 PROCEDURE — S9338 HIT IMMUNOTHERAPY DIEM: HCPCS

## 2025-08-26 PROCEDURE — S1015 IV TUBING EXTENSION SET: HCPCS

## 2025-08-26 PROCEDURE — 99602 HOME NFS VISIT EACH ADDL HR: CPT | Mod: SS

## (undated) DEVICE — SUCTION MANIFOLD NEPTUNE 2 SYS 4 PORT 0702-020-000

## (undated) DEVICE — DRAIN JACKSON PRATT ROUND SIL 19FR W/TROCAR LF JP-2232

## (undated) DEVICE — BLADE CLIPPER SGL USE 9680

## (undated) DEVICE — LINEN TOWEL PACK X30 5481

## (undated) DEVICE — ENDO CLIP CARTIRDGE K2 MED/LG 10 1112

## (undated) DEVICE — SOL WATER IRRIG 500ML BOTTLE 2F7113

## (undated) DEVICE — RX SURGIFLO HEMOSTATIC MATRIX W/THROMBIN 8ML 2994

## (undated) DEVICE — DRAPE IOBAN INCISE 23X17" 6650EZ

## (undated) DEVICE — SOL WATER IRRIG 1000ML BOTTLE 2F7114

## (undated) DEVICE — LINEN TOWEL PACK X6 WHITE 5487

## (undated) DEVICE — ENDO POUCH UNIVERSAL RETRIEVAL SYSTEM INZII 12/15MM CD004

## (undated) DEVICE — TUBING SUCTION 12"X1/4" N612

## (undated) DEVICE — GOWN IMPERVIOUS 2XL BLUE

## (undated) DEVICE — KIT ENDO TURNOVER/PROCEDURE CARRY-ON 101822

## (undated) DEVICE — KIT ENDO FIRST STEP DISINFECTANT 200ML W/POUCH EP-4

## (undated) DEVICE — ESU LIGASURE MARYLAND LAPAROSCOPIC SLR/DVDR 5MMX37CM LF1937

## (undated) DEVICE — SUCTION MANIFOLD NEPTUNE 2 SYS 1 PORT 702-025-000

## (undated) DEVICE — ENDO FORCEP BX CAPTURA PRO SPIKE G50696

## (undated) DEVICE — SNARE CAPIVATOR ROUND COLD SNR BX10 M00561101

## (undated) DEVICE — SU VICRYL 0 UR-6 27" J603H

## (undated) DEVICE — ENDO APPLICATOR SURGIFLO PLASMA COBLATION MS1995

## (undated) DEVICE — GLOVE PROTEXIS POWDER FREE 8.0 ORTHOPEDIC 2D73ET80

## (undated) DEVICE — DRSG PRIMAPORE 02X3" 7133

## (undated) DEVICE — CLIP APPLIER ENDO 5MM M/L LIGAMAX EL5ML

## (undated) DEVICE — ESU ELEC BLADE 2.75" COATED/INSULATED E1455

## (undated) DEVICE — SU VICRYL 3-0 SH 27" J316H

## (undated) DEVICE — ESU PENCIL W/COATED BLADE E2450H

## (undated) DEVICE — LINEN GOWN XLG 5407

## (undated) DEVICE — PREP CHLORAPREP 26ML TINTED ORANGE  260815

## (undated) DEVICE — SPONGE RAY-TEC 4X8" 7318

## (undated) DEVICE — SU PROLENE 4-0 RB-1DA 36" 8557H

## (undated) DEVICE — ENDO SCOPE WARMER SEAL  C3101

## (undated) DEVICE — ENDO TROCAR FIRST ENTRY KII FIOS Z-THRD 05X100MM CTF03

## (undated) DEVICE — SU SILK 0 SH 30" K834H

## (undated) DEVICE — SU VICRYL 4-0 PS-2 18" UND J496H

## (undated) DEVICE — DRAPE SHEET REV FOLD 3/4 9349

## (undated) DEVICE — ESU GROUND PAD ADULT W/CORD E7507

## (undated) DEVICE — BLADE SAW STERNAL 20X30MM KM-32

## (undated) DEVICE — APPLICATORS COTTON TIP 6"X2 STERILE LF C15053-006

## (undated) DEVICE — SUCTION DRY CHEST DRAIN OASIS 3600-100

## (undated) DEVICE — ADH SKIN CLOSURE PREMIERPRO EXOFIN 1.0ML 3470

## (undated) DEVICE — CONNECTOR BLAKE DRAIN SGL BCC1

## (undated) DEVICE — TUBING SUCTION 10'X3/16" N510

## (undated) DEVICE — DECANTER VIAL 2006S

## (undated) DEVICE — Device

## (undated) RX ORDER — GABAPENTIN 300 MG/1
CAPSULE ORAL
Status: DISPENSED
Start: 2021-03-22

## (undated) RX ORDER — FENTANYL CITRATE 50 UG/ML
INJECTION, SOLUTION INTRAMUSCULAR; INTRAVENOUS
Status: DISPENSED
Start: 2021-03-22

## (undated) RX ORDER — BUPIVACAINE HYDROCHLORIDE 5 MG/ML
INJECTION, SOLUTION EPIDURAL; INTRACAUDAL
Status: DISPENSED
Start: 2021-03-22

## (undated) RX ORDER — ACETAMINOPHEN 325 MG/1
TABLET ORAL
Status: DISPENSED
Start: 2021-03-22

## (undated) RX ORDER — HYDROMORPHONE HYDROCHLORIDE 1 MG/ML
INJECTION, SOLUTION INTRAMUSCULAR; INTRAVENOUS; SUBCUTANEOUS
Status: DISPENSED
Start: 2021-03-22

## (undated) RX ORDER — CELECOXIB 200 MG/1
CAPSULE ORAL
Status: DISPENSED
Start: 2021-03-22

## (undated) RX ORDER — BUPIVACAINE HYDROCHLORIDE 2.5 MG/ML
INJECTION, SOLUTION EPIDURAL; INFILTRATION; INTRACAUDAL
Status: DISPENSED
Start: 2021-03-22

## (undated) RX ORDER — CEFAZOLIN SODIUM 2 G/100ML
INJECTION, SOLUTION INTRAVENOUS
Status: DISPENSED
Start: 2021-03-22

## (undated) RX ORDER — SODIUM CHLORIDE 9 MG/ML
INJECTION, SOLUTION INTRAVENOUS
Status: DISPENSED
Start: 2021-03-22

## (undated) RX ORDER — CHLORHEXIDINE GLUCONATE ORAL RINSE 1.2 MG/ML
SOLUTION DENTAL
Status: DISPENSED
Start: 2021-03-22